# Patient Record
Sex: FEMALE | Race: WHITE | Employment: OTHER | URBAN - METROPOLITAN AREA
[De-identification: names, ages, dates, MRNs, and addresses within clinical notes are randomized per-mention and may not be internally consistent; named-entity substitution may affect disease eponyms.]

---

## 2017-05-18 ENCOUNTER — OFFICE VISIT (OUTPATIENT)
Dept: FAMILY MEDICINE CLINIC | Age: 62
End: 2017-05-18

## 2017-05-18 VITALS
TEMPERATURE: 98 F | HEART RATE: 74 BPM | RESPIRATION RATE: 18 BRPM | WEIGHT: 238 LBS | SYSTOLIC BLOOD PRESSURE: 124 MMHG | DIASTOLIC BLOOD PRESSURE: 82 MMHG | BODY MASS INDEX: 46.72 KG/M2 | HEIGHT: 60 IN

## 2017-05-18 DIAGNOSIS — L03.116 CELLULITIS OF LEFT LOWER EXTREMITY: Primary | ICD-10-CM

## 2017-05-18 DIAGNOSIS — Z76.0 MEDICATION REFILL: ICD-10-CM

## 2017-05-18 DIAGNOSIS — G47.30 SLEEP APNEA, UNSPECIFIED TYPE: ICD-10-CM

## 2017-05-18 PROCEDURE — 99202 OFFICE O/P NEW SF 15 MIN: CPT | Performed by: NURSE PRACTITIONER

## 2017-05-18 RX ORDER — MONTELUKAST SODIUM 10 MG/1
10 TABLET ORAL NIGHTLY
Qty: 30 TABLET | Refills: 0 | Status: SHIPPED | OUTPATIENT
Start: 2017-05-18 | End: 2017-06-01 | Stop reason: SDUPTHER

## 2017-05-18 RX ORDER — SULFAMETHOXAZOLE AND TRIMETHOPRIM 800; 160 MG/1; MG/1
1 TABLET ORAL 2 TIMES DAILY
Qty: 28 TABLET | Refills: 0 | Status: SHIPPED | OUTPATIENT
Start: 2017-05-18 | End: 2017-06-01 | Stop reason: ALTCHOICE

## 2017-05-18 ASSESSMENT — ENCOUNTER SYMPTOMS
VOMITING: 0
DIARRHEA: 0
SHORTNESS OF BREATH: 0
SORE THROAT: 0
WHEEZING: 0
TROUBLE SWALLOWING: 0
NAUSEA: 0
COLOR CHANGE: 1
COUGH: 0

## 2017-06-01 ENCOUNTER — OFFICE VISIT (OUTPATIENT)
Dept: FAMILY MEDICINE CLINIC | Age: 62
End: 2017-06-01

## 2017-06-01 VITALS
SYSTOLIC BLOOD PRESSURE: 128 MMHG | HEART RATE: 76 BPM | BODY MASS INDEX: 45.2 KG/M2 | OXYGEN SATURATION: 96 % | DIASTOLIC BLOOD PRESSURE: 70 MMHG | HEIGHT: 60 IN | WEIGHT: 230.25 LBS | TEMPERATURE: 98 F | RESPIRATION RATE: 18 BRPM

## 2017-06-01 DIAGNOSIS — Z13.220 SCREENING CHOLESTEROL LEVEL: ICD-10-CM

## 2017-06-01 DIAGNOSIS — Z23 NEED FOR SHINGLES VACCINE: ICD-10-CM

## 2017-06-01 DIAGNOSIS — Z11.59 NEED FOR HEPATITIS C SCREENING TEST: ICD-10-CM

## 2017-06-01 DIAGNOSIS — I10 ESSENTIAL HYPERTENSION: ICD-10-CM

## 2017-06-01 DIAGNOSIS — E11.9 CONTROLLED TYPE 2 DIABETES MELLITUS WITHOUT COMPLICATION, WITHOUT LONG-TERM CURRENT USE OF INSULIN (HCC): Primary | ICD-10-CM

## 2017-06-01 DIAGNOSIS — Z11.4 SCREENING FOR HIV (HUMAN IMMUNODEFICIENCY VIRUS): ICD-10-CM

## 2017-06-01 PROBLEM — G25.0 ESSENTIAL TREMOR: Status: ACTIVE | Noted: 2017-06-01

## 2017-06-01 PROBLEM — S83.412A SPRAIN OF MEDIAL COLLATERAL LIGAMENT OF LEFT KNEE: Status: ACTIVE | Noted: 2017-06-01

## 2017-06-01 PROCEDURE — 90736 HZV VACCINE LIVE SUBQ: CPT | Performed by: NURSE PRACTITIONER

## 2017-06-01 PROCEDURE — 99214 OFFICE O/P EST MOD 30 MIN: CPT | Performed by: NURSE PRACTITIONER

## 2017-06-01 PROCEDURE — 90471 IMMUNIZATION ADMIN: CPT | Performed by: NURSE PRACTITIONER

## 2017-06-01 RX ORDER — MONTELUKAST SODIUM 10 MG/1
10 TABLET ORAL NIGHTLY
Qty: 30 TABLET | Refills: 5 | Status: SHIPPED | OUTPATIENT
Start: 2017-06-01 | End: 2017-11-15 | Stop reason: SDUPTHER

## 2017-06-05 ENCOUNTER — HOSPITAL ENCOUNTER (OUTPATIENT)
Dept: PHYSICAL THERAPY | Age: 62
Setting detail: THERAPIES SERIES
Discharge: HOME | End: 2017-06-05
Payer: COMMERCIAL

## 2017-06-05 PROCEDURE — G8979 MOBILITY GOAL STATUS: HCPCS

## 2017-06-05 PROCEDURE — 97162 PT EVAL MOD COMPLEX 30 MIN: CPT

## 2017-06-05 PROCEDURE — G8978 MOBILITY CURRENT STATUS: HCPCS

## 2017-06-05 PROCEDURE — 97110 THERAPEUTIC EXERCISES: CPT

## 2017-06-08 ENCOUNTER — HOSPITAL ENCOUNTER (OUTPATIENT)
Dept: PHYSICAL THERAPY | Age: 62
Setting detail: THERAPIES SERIES
Discharge: HOME | End: 2017-06-08
Payer: COMMERCIAL

## 2017-06-08 PROCEDURE — 97110 THERAPEUTIC EXERCISES: CPT

## 2017-06-08 PROCEDURE — 97140 MANUAL THERAPY 1/> REGIONS: CPT

## 2017-06-08 ASSESSMENT — PAIN DESCRIPTION - FREQUENCY: FREQUENCY: INTERMITTENT

## 2017-06-08 ASSESSMENT — PAIN DESCRIPTION - PAIN TYPE: TYPE: CHRONIC PAIN

## 2017-06-08 ASSESSMENT — PAIN DESCRIPTION - LOCATION: LOCATION: KNEE

## 2017-06-08 ASSESSMENT — PAIN DESCRIPTION - ORIENTATION: ORIENTATION: LEFT

## 2017-06-08 ASSESSMENT — PAIN SCALES - GENERAL: PAINLEVEL_OUTOF10: 3

## 2017-06-12 ENCOUNTER — HOSPITAL ENCOUNTER (OUTPATIENT)
Dept: PHYSICAL THERAPY | Age: 62
Setting detail: THERAPIES SERIES
Discharge: HOME | End: 2017-06-12
Payer: COMMERCIAL

## 2017-06-12 PROCEDURE — 97110 THERAPEUTIC EXERCISES: CPT

## 2017-06-12 ASSESSMENT — ENCOUNTER SYMPTOMS
WHEEZING: 0
COUGH: 0
COLOR CHANGE: 0
ORTHOPNEA: 0
VISUAL CHANGE: 0
BLURRED VISION: 0
CHEST TIGHTNESS: 0
SHORTNESS OF BREATH: 0

## 2017-06-14 ENCOUNTER — HOSPITAL ENCOUNTER (OUTPATIENT)
Dept: PHYSICAL THERAPY | Age: 62
Setting detail: THERAPIES SERIES
Discharge: HOME | End: 2017-06-14
Payer: COMMERCIAL

## 2017-06-14 PROCEDURE — 97140 MANUAL THERAPY 1/> REGIONS: CPT

## 2017-06-14 PROCEDURE — 97110 THERAPEUTIC EXERCISES: CPT

## 2017-06-19 ENCOUNTER — HOSPITAL ENCOUNTER (OUTPATIENT)
Dept: PHYSICAL THERAPY | Age: 62
Setting detail: THERAPIES SERIES
Discharge: HOME | End: 2017-06-19
Payer: COMMERCIAL

## 2017-06-19 PROCEDURE — 97140 MANUAL THERAPY 1/> REGIONS: CPT

## 2017-06-19 PROCEDURE — 97110 THERAPEUTIC EXERCISES: CPT

## 2017-06-21 ENCOUNTER — HOSPITAL ENCOUNTER (OUTPATIENT)
Dept: PHYSICAL THERAPY | Age: 62
Discharge: HOME | End: 2017-06-21

## 2017-06-26 ENCOUNTER — HOSPITAL ENCOUNTER (OUTPATIENT)
Dept: PHYSICAL THERAPY | Age: 62
Setting detail: THERAPIES SERIES
Discharge: HOME | End: 2017-06-26
Payer: COMMERCIAL

## 2017-06-26 PROCEDURE — 97140 MANUAL THERAPY 1/> REGIONS: CPT

## 2017-06-26 PROCEDURE — 97530 THERAPEUTIC ACTIVITIES: CPT

## 2017-06-26 PROCEDURE — 97110 THERAPEUTIC EXERCISES: CPT

## 2017-11-18 RX ORDER — MONTELUKAST SODIUM 10 MG/1
10 TABLET ORAL NIGHTLY
Qty: 30 TABLET | Refills: 1 | Status: SHIPPED | OUTPATIENT
Start: 2017-11-18 | End: 2017-12-06 | Stop reason: SDUPTHER

## 2017-12-01 ENCOUNTER — HOSPITAL ENCOUNTER (OUTPATIENT)
Dept: LAB | Age: 62
Discharge: HOME | End: 2017-12-01
Payer: COMMERCIAL

## 2017-12-01 DIAGNOSIS — Z11.4 SCREENING FOR HIV (HUMAN IMMUNODEFICIENCY VIRUS): ICD-10-CM

## 2017-12-01 DIAGNOSIS — I10 ESSENTIAL HYPERTENSION: ICD-10-CM

## 2017-12-01 DIAGNOSIS — Z13.220 SCREENING CHOLESTEROL LEVEL: ICD-10-CM

## 2017-12-01 DIAGNOSIS — E11.9 CONTROLLED TYPE 2 DIABETES MELLITUS WITHOUT COMPLICATION, WITHOUT LONG-TERM CURRENT USE OF INSULIN (HCC): ICD-10-CM

## 2017-12-01 DIAGNOSIS — Z11.59 NEED FOR HEPATITIS C SCREENING TEST: ICD-10-CM

## 2017-12-01 LAB
ALBUMIN SERPL-MCNC: 4.2 G/DL (ref 3.9–4.9)
ALP BLD-CCNC: 64 U/L (ref 40–130)
ALT SERPL-CCNC: 28 U/L (ref 0–33)
ANION GAP SERPL CALCULATED.3IONS-SCNC: 11 MEQ/L (ref 7–13)
AST SERPL-CCNC: 26 U/L (ref 0–35)
BASOPHILS ABSOLUTE: 0 K/UL (ref 0–0.2)
BASOPHILS RELATIVE PERCENT: 0.4 %
BILIRUB SERPL-MCNC: 0.4 MG/DL (ref 0–1.2)
BUN BLDV-MCNC: 16 MG/DL (ref 8–23)
CALCIUM SERPL-MCNC: 8.9 MG/DL (ref 8.6–10.2)
CHLORIDE BLD-SCNC: 102 MEQ/L (ref 98–107)
CHOLESTEROL, TOTAL: 152 MG/DL (ref 0–199)
CO2: 29 MEQ/L (ref 22–29)
CREAT SERPL-MCNC: 0.61 MG/DL (ref 0.5–0.9)
CREATININE URINE: 175.1 MG/DL
EOSINOPHILS ABSOLUTE: 0.2 K/UL (ref 0–0.7)
EOSINOPHILS RELATIVE PERCENT: 2.6 %
GFR AFRICAN AMERICAN: >60
GFR NON-AFRICAN AMERICAN: >60
GLOBULIN: 2.4 G/DL (ref 2.3–3.5)
GLUCOSE BLD-MCNC: 137 MG/DL (ref 74–109)
HBA1C MFR BLD: 6.4 % (ref 4.8–5.9)
HCT VFR BLD CALC: 41.8 % (ref 37–47)
HDLC SERPL-MCNC: 44 MG/DL (ref 40–59)
HEMOGLOBIN: 13.9 G/DL (ref 12–16)
HEPATITIS C ANTIBODY INTERPRETATION: NORMAL
LDL CHOLESTEROL CALCULATED: 90 MG/DL (ref 0–129)
LYMPHOCYTES ABSOLUTE: 1.6 K/UL (ref 1–4.8)
LYMPHOCYTES RELATIVE PERCENT: 24.4 %
MCH RBC QN AUTO: 29.7 PG (ref 27–31.3)
MCHC RBC AUTO-ENTMCNC: 33.3 % (ref 33–37)
MCV RBC AUTO: 89.2 FL (ref 82–100)
MICROALBUMIN UR-MCNC: 1.3 MG/DL
MICROALBUMIN/CREAT UR-RTO: 7.4 MG/G (ref 0–30)
MONOCYTES ABSOLUTE: 0.5 K/UL (ref 0.2–0.8)
MONOCYTES RELATIVE PERCENT: 7.7 %
NEUTROPHILS ABSOLUTE: 4.3 K/UL (ref 1.4–6.5)
NEUTROPHILS RELATIVE PERCENT: 64.9 %
PDW BLD-RTO: 14.8 % (ref 11.5–14.5)
PLATELET # BLD: 291 K/UL (ref 130–400)
POTASSIUM SERPL-SCNC: 4 MEQ/L (ref 3.5–5.1)
RBC # BLD: 4.69 M/UL (ref 4.2–5.4)
SODIUM BLD-SCNC: 142 MEQ/L (ref 132–144)
T4 FREE: 1.29 NG/DL (ref 0.93–1.7)
TOTAL PROTEIN: 6.6 G/DL (ref 6.4–8.1)
TRIGL SERPL-MCNC: 91 MG/DL (ref 0–200)
TSH SERPL DL<=0.05 MIU/L-ACNC: 1.2 UIU/ML (ref 0.27–4.2)
WBC # BLD: 6.7 K/UL (ref 4.8–10.8)

## 2017-12-01 PROCEDURE — 84443 ASSAY THYROID STIM HORMONE: CPT

## 2017-12-01 PROCEDURE — 82570 ASSAY OF URINE CREATININE: CPT

## 2017-12-01 PROCEDURE — 82043 UR ALBUMIN QUANTITATIVE: CPT

## 2017-12-01 PROCEDURE — 86703 HIV-1/HIV-2 1 RESULT ANTBDY: CPT

## 2017-12-01 PROCEDURE — 36415 COLL VENOUS BLD VENIPUNCTURE: CPT

## 2017-12-01 PROCEDURE — 80061 LIPID PANEL: CPT

## 2017-12-01 PROCEDURE — 80053 COMPREHEN METABOLIC PANEL: CPT

## 2017-12-01 PROCEDURE — 84439 ASSAY OF FREE THYROXINE: CPT

## 2017-12-01 PROCEDURE — 86803 HEPATITIS C AB TEST: CPT

## 2017-12-01 PROCEDURE — 83036 HEMOGLOBIN GLYCOSYLATED A1C: CPT

## 2017-12-01 PROCEDURE — 85025 COMPLETE CBC W/AUTO DIFF WBC: CPT

## 2017-12-04 LAB — HIV-1 AND HIV-2 ANTIBODIES: NEGATIVE

## 2017-12-06 ENCOUNTER — OFFICE VISIT (OUTPATIENT)
Dept: FAMILY MEDICINE CLINIC | Age: 62
End: 2017-12-06

## 2017-12-06 VITALS
RESPIRATION RATE: 18 BRPM | BODY MASS INDEX: 44.72 KG/M2 | OXYGEN SATURATION: 95 % | HEIGHT: 60 IN | TEMPERATURE: 97.5 F | SYSTOLIC BLOOD PRESSURE: 138 MMHG | DIASTOLIC BLOOD PRESSURE: 88 MMHG | WEIGHT: 227.8 LBS | HEART RATE: 86 BPM

## 2017-12-06 DIAGNOSIS — Z12.11 SCREEN FOR COLON CANCER: ICD-10-CM

## 2017-12-06 DIAGNOSIS — J45.20 MILD INTERMITTENT ASTHMA WITHOUT COMPLICATION: ICD-10-CM

## 2017-12-06 DIAGNOSIS — I10 ESSENTIAL HYPERTENSION: ICD-10-CM

## 2017-12-06 DIAGNOSIS — Z12.39 SCREENING BREAST EXAMINATION: ICD-10-CM

## 2017-12-06 DIAGNOSIS — E11.9 TYPE 2 DIABETES MELLITUS WITHOUT COMPLICATION, WITHOUT LONG-TERM CURRENT USE OF INSULIN (HCC): Primary | ICD-10-CM

## 2017-12-06 DIAGNOSIS — Z23 NEED FOR TDAP VACCINATION: ICD-10-CM

## 2017-12-06 PROCEDURE — 3044F HG A1C LEVEL LT 7.0%: CPT | Performed by: NURSE PRACTITIONER

## 2017-12-06 PROCEDURE — 3017F COLORECTAL CA SCREEN DOC REV: CPT | Performed by: NURSE PRACTITIONER

## 2017-12-06 PROCEDURE — G8417 CALC BMI ABV UP PARAM F/U: HCPCS | Performed by: NURSE PRACTITIONER

## 2017-12-06 PROCEDURE — 99213 OFFICE O/P EST LOW 20 MIN: CPT | Performed by: NURSE PRACTITIONER

## 2017-12-06 PROCEDURE — G8427 DOCREV CUR MEDS BY ELIG CLIN: HCPCS | Performed by: NURSE PRACTITIONER

## 2017-12-06 PROCEDURE — 90715 TDAP VACCINE 7 YRS/> IM: CPT | Performed by: NURSE PRACTITIONER

## 2017-12-06 PROCEDURE — G8484 FLU IMMUNIZE NO ADMIN: HCPCS | Performed by: NURSE PRACTITIONER

## 2017-12-06 PROCEDURE — 90471 IMMUNIZATION ADMIN: CPT | Performed by: NURSE PRACTITIONER

## 2017-12-06 PROCEDURE — 1036F TOBACCO NON-USER: CPT | Performed by: NURSE PRACTITIONER

## 2017-12-06 PROCEDURE — 3014F SCREEN MAMMO DOC REV: CPT | Performed by: NURSE PRACTITIONER

## 2017-12-06 RX ORDER — LOSARTAN POTASSIUM 25 MG/1
25 TABLET ORAL DAILY
Qty: 90 TABLET | Refills: 2 | Status: SHIPPED | OUTPATIENT
Start: 2017-12-06 | End: 2018-01-04 | Stop reason: SDUPTHER

## 2017-12-06 RX ORDER — GLIMEPIRIDE 1 MG/1
1 TABLET ORAL
Qty: 90 TABLET | Refills: 2 | Status: SHIPPED | OUTPATIENT
Start: 2017-12-06 | End: 2018-01-04 | Stop reason: SDUPTHER

## 2017-12-06 RX ORDER — LOSARTAN POTASSIUM 25 MG/1
25 TABLET ORAL DAILY
Qty: 30 TABLET | Refills: 0 | Status: SHIPPED | OUTPATIENT
Start: 2017-12-06 | End: 2017-12-06 | Stop reason: SDUPTHER

## 2017-12-06 RX ORDER — METOPROLOL TARTRATE AND HYDROCHLOROTHIAZIDE 100; 25 MG/1; MG/1
1 TABLET ORAL DAILY
Qty: 90 TABLET | Refills: 2 | Status: SHIPPED | OUTPATIENT
Start: 2017-12-06 | End: 2018-08-18 | Stop reason: SDUPTHER

## 2017-12-06 RX ORDER — GLIMEPIRIDE 1 MG/1
1 TABLET ORAL
Qty: 30 TABLET | Refills: 0 | Status: SHIPPED | OUTPATIENT
Start: 2017-12-06 | End: 2017-12-06 | Stop reason: SDUPTHER

## 2017-12-06 RX ORDER — MONTELUKAST SODIUM 10 MG/1
10 TABLET ORAL NIGHTLY
Qty: 90 TABLET | Refills: 2 | Status: SHIPPED | OUTPATIENT
Start: 2017-12-06 | End: 2018-08-18 | Stop reason: SDUPTHER

## 2017-12-06 RX ORDER — METOPROLOL TARTRATE AND HYDROCHLOROTHIAZIDE 100; 25 MG/1; MG/1
1 TABLET ORAL DAILY
Qty: 30 TABLET | Refills: 0 | Status: SHIPPED | OUTPATIENT
Start: 2017-12-06 | End: 2017-12-06 | Stop reason: SDUPTHER

## 2017-12-06 RX ORDER — INFLUENZA A VIRUS A/SINGAPORE/GP1908/2015 IVR-180A (H1N1) ANTIGEN (PROPIOLACTONE INACTIVATED), INFLUENZA A VIRUS A/SINGAPORE/INFIMH-16-0019/2016 IVR-186 (H3N2) ANTIGEN (PROPIOLACTONE INACTIVATED), INFLUENZA B VIRUS B/MARYLAND/15/2016 ANTIGEN (PROPIOLACTONE INACTIVATED), AND INFLUENZA B VIRUS B/PHUKET/3073/2013 BVR-1B ANTIGEN (PROPIOLACTONE INACTIVATED) 15; 15; 15; 15 UG/.5ML; UG/.5ML; UG/.5ML; UG/.5ML
INJECTION, SUSPENSION INTRAMUSCULAR
Refills: 0 | COMMUNITY
Start: 2017-10-12 | End: 2017-12-06

## 2017-12-06 ASSESSMENT — ENCOUNTER SYMPTOMS
EYE PAIN: 0
SHORTNESS OF BREATH: 0
SORE THROAT: 0
SINUS PAIN: 0
VOMITING: 0
NAUSEA: 0
COUGH: 1
ABDOMINAL PAIN: 0
EYE DISCHARGE: 0
DIARRHEA: 0
RHINORRHEA: 0
CHEST TIGHTNESS: 0
WHEEZING: 0
TROUBLE SWALLOWING: 0

## 2017-12-06 NOTE — PROGRESS NOTES
Subjective:      Patient ID: Rosa Corea is a 58 y.o. female who presents today for:  Chief Complaint   Patient presents with    Diabetes     Presents today for a follow up. And medication Refill was last seen on 12/01/17       Diabetes   She presents for her follow-up diabetic visit. She has type 2 diabetes mellitus. Her disease course has been stable. There are no hypoglycemic associated symptoms. Pertinent negatives for hypoglycemia include no dizziness or headaches. There are no diabetic associated symptoms. Pertinent negatives for diabetes include no chest pain, no fatigue, no polydipsia, no polyphagia, no polyuria and no weakness. There are no hypoglycemic complications. Symptoms are stable. Current diabetic treatment includes oral agent (dual therapy) and diet. She is compliant with treatment most of the time. Her weight is stable. Meal planning includes avoidance of concentrated sweets. She has not had a previous visit with a dietitian. She rarely participates in exercise. Frequency home blood tests: does not check blood sugar at home. There is no compliance with monitoring of blood glucose. An ACE inhibitor/angiotensin II receptor blocker is being taken. She does not see a podiatrist.Eye exam is current (next one scheduled in feb). Past Medical History:   Diagnosis Date    Asthma     Sleep apnea     wears cpap    Type 2 diabetes mellitus without complication ([facility] [address]      Current Outpatient Prescriptions on File Prior to Visit   Medication Sig Dispense Refill    VENTOLIN  (90 BASE) MCG/ACT inhaler INHALE 2 PUFFS EVERY 4 HOURS AS NEEDED  5    Multiple Vitamins-Minerals (MULTIVITAMIN & MINERAL PO) Take by mouth      Calcium Carb-Cholecalciferol (CALCIUM CARBONATE-VITAMIN D3 PO) Take by mouth      aspirin 81 MG tablet Take 81 mg by mouth       No current facility-administered medications on file prior to visit.       Past Surgical History:   Procedure Laterality Date    CARPAL TUNNEL 5. Screen for colon cancer  Referral To Gastroenterology External - Junior Asuncion MD   6.  Need for Tdap vaccination  Tdap (age 6y and older) IM ([address])     Orders Placed This Encounter   Procedures    JAMILA DIGITAL SCREEN W OR WO CAD BILATERAL     Standing Status:   Future     Standing Expiration Date:   2/6/2019     Order Specific Question:   Reason for exam:     Answer:   breast cancer screen    Tdap (age 6y and older) IM ([address])    Referral To Gastroenterology External - Junior Asuncion MD     Referral Priority:   Routine     Referral Type:   Consult for Advice and Opinion     Referral Reason:   Specialty Services Required     Referred to Provider:   Toney Foley MD     Requested Specialty:   Gastroenterology     Number of Visits Requested:   1     Orders Placed This Encounter   Medications    DISCONTD: metoprolol-hydrochlorothiazide (LOPRESSOR HCT) 100-25 MG per tablet     Sig: Take 1 tablet by mouth daily     Dispense:  30 tablet     Refill:  0    DISCONTD: losartan (COZAAR) 25 MG tablet     Sig: Take 1 tablet by mouth daily     Dispense:  30 tablet     Refill:  0    DISCONTD: glimepiride (AMARYL) 1 MG tablet     Sig: Take 1 tablet by mouth every morning (before breakfast)     Dispense:  30 tablet     Refill:  0    metoprolol-hydrochlorothiazide (LOPRESSOR HCT) 100-25 MG per tablet     Sig: Take 1 tablet by mouth daily     Dispense:  90 tablet     Refill:  2    losartan (COZAAR) 25 MG tablet     Sig: Take 1 tablet by mouth daily     Dispense:  90 tablet     Refill:  2    glimepiride (AMARYL) 1 MG tablet     Sig: Take 1 tablet by mouth every morning (before breakfast)     Dispense:  90 tablet     Refill:  2    metFORMIN (GLUCOPHAGE) 850 MG tablet     Sig: Take 1 tablet by mouth 2 times daily (with meals)     Dispense:  180 tablet     Refill:  2    montelukast (SINGULAIR) 10 MG tablet     Sig: Take 1 tablet by mouth nightly     Dispense:  90 tablet     Refill:  2    QVAR 80 MCG/ACT inhaler     Sig:

## 2018-01-04 ENCOUNTER — HOSPITAL ENCOUNTER (OUTPATIENT)
Dept: WOMENS IMAGING | Age: 63
Discharge: HOME | End: 2018-01-04
Payer: COMMERCIAL

## 2018-01-04 DIAGNOSIS — I10 ESSENTIAL HYPERTENSION: ICD-10-CM

## 2018-01-04 DIAGNOSIS — E11.9 TYPE 2 DIABETES MELLITUS WITHOUT COMPLICATION, WITHOUT LONG-TERM CURRENT USE OF INSULIN (HCC): ICD-10-CM

## 2018-01-04 DIAGNOSIS — Z12.39 SCREENING BREAST EXAMINATION: ICD-10-CM

## 2018-01-04 PROCEDURE — 77067 SCR MAMMO BI INCL CAD: CPT

## 2018-01-08 RX ORDER — LOSARTAN POTASSIUM 25 MG/1
25 TABLET ORAL DAILY
Qty: 90 TABLET | Refills: 2 | Status: SHIPPED | OUTPATIENT
Start: 2018-01-08 | End: 2018-11-12 | Stop reason: SDUPTHER

## 2018-01-08 RX ORDER — GLIMEPIRIDE 1 MG/1
1 TABLET ORAL
Qty: 90 TABLET | Refills: 2 | Status: SHIPPED | OUTPATIENT
Start: 2018-01-08 | End: 2018-10-15 | Stop reason: SDUPTHER

## 2018-08-18 DIAGNOSIS — J45.20 MILD INTERMITTENT ASTHMA WITHOUT COMPLICATION: ICD-10-CM

## 2018-08-18 DIAGNOSIS — I10 ESSENTIAL HYPERTENSION: ICD-10-CM

## 2018-08-19 RX ORDER — MONTELUKAST SODIUM 10 MG/1
10 TABLET ORAL NIGHTLY
Qty: 90 TABLET | Refills: 2 | Status: SHIPPED | OUTPATIENT
Start: 2018-08-19 | End: 2019-03-08 | Stop reason: SDUPTHER

## 2018-08-19 RX ORDER — METOPROLOL TARTRATE AND HYDROCHLOROTHIAZIDE 100; 25 MG/1; MG/1
1 TABLET ORAL DAILY
Qty: 90 TABLET | Refills: 2 | Status: SHIPPED | OUTPATIENT
Start: 2018-08-19 | End: 2019-03-08 | Stop reason: SDUPTHER

## 2018-08-19 NOTE — TELEPHONE ENCOUNTER
pharmacy requesting medication refill. Please approve or deny this request.    Rx requested:  Requested Prescriptions     Pending Prescriptions Disp Refills    metoprolol-hydrochlorothiazide (LOPRESSOR HCT) 100-25 MG per tablet [Pharmacy Med Name: METOPROLOL HYDROCHLOROTHIAZIDE  TAB  100 25] 90 tablet      Sig: TAKE 1 TABLET BY MOUTH  DAILY    montelukast (SINGULAIR) 10 MG tablet [Pharmacy Med Name: MONTELUKAST 10MG TABLET] 90 tablet      Sig: TAKE 1 TABLET BY MOUTH  NIGHTLY       Last Office Visit:   12/6/17    Last Labs:  12/1/17    Next Visit Date:  No future appointments.

## 2018-10-12 DIAGNOSIS — E11.9 TYPE 2 DIABETES MELLITUS WITHOUT COMPLICATION, WITHOUT LONG-TERM CURRENT USE OF INSULIN (HCC): ICD-10-CM

## 2018-10-13 NOTE — TELEPHONE ENCOUNTER
pharmacy requesting medication refill.  Please approve or deny this request.    Rx requested:  Requested Prescriptions     Pending Prescriptions Disp Refills    glimepiride (AMARYL) 1 MG tablet [Pharmacy Med Name: GLIMEPIRIDE  1MG  TAB] 90 tablet      Sig: TAKE 1 TABLET BY Maria C       Last Office Visit:     12/6/17

## 2018-10-15 RX ORDER — GLIMEPIRIDE 1 MG/1
TABLET ORAL
Qty: 90 TABLET | Refills: 2 | Status: SHIPPED | OUTPATIENT
Start: 2018-10-15 | End: 2019-03-08 | Stop reason: SDUPTHER

## 2018-11-11 DIAGNOSIS — E11.9 TYPE 2 DIABETES MELLITUS WITHOUT COMPLICATION, WITHOUT LONG-TERM CURRENT USE OF INSULIN (HCC): ICD-10-CM

## 2018-11-11 DIAGNOSIS — I10 ESSENTIAL HYPERTENSION: ICD-10-CM

## 2018-11-12 RX ORDER — LOSARTAN POTASSIUM 25 MG/1
25 TABLET ORAL DAILY
Qty: 90 TABLET | Refills: 0 | Status: SHIPPED | OUTPATIENT
Start: 2018-11-12 | End: 2019-03-08 | Stop reason: SDUPTHER

## 2019-01-21 ENCOUNTER — HOSPITAL ENCOUNTER (EMERGENCY)
Age: 64
Discharge: HOME | End: 2019-01-21
Attending: EMERGENCY MEDICINE
Payer: COMMERCIAL

## 2019-01-21 ENCOUNTER — APPOINTMENT (OUTPATIENT)
Dept: CT IMAGING | Age: 64
End: 2019-01-21
Payer: COMMERCIAL

## 2019-01-21 VITALS
RESPIRATION RATE: 18 BRPM | BODY MASS INDEX: 44.76 KG/M2 | SYSTOLIC BLOOD PRESSURE: 138 MMHG | DIASTOLIC BLOOD PRESSURE: 63 MMHG | HEIGHT: 60 IN | WEIGHT: 228 LBS | OXYGEN SATURATION: 97 % | TEMPERATURE: 97.9 F | HEART RATE: 57 BPM

## 2019-01-21 DIAGNOSIS — N20.0 KIDNEY STONE: ICD-10-CM

## 2019-01-21 DIAGNOSIS — R10.9 FLANK PAIN: Primary | ICD-10-CM

## 2019-01-21 LAB
ALBUMIN SERPL-MCNC: 4.3 G/DL (ref 3.9–4.9)
ALP BLD-CCNC: 83 U/L (ref 40–130)
ALT SERPL-CCNC: 23 U/L (ref 0–33)
ANION GAP SERPL CALCULATED.3IONS-SCNC: 13 MEQ/L (ref 7–13)
AST SERPL-CCNC: 21 U/L (ref 0–35)
BACTERIA: ABNORMAL /HPF
BASOPHILS ABSOLUTE: 0 K/UL (ref 0–0.2)
BASOPHILS RELATIVE PERCENT: 0.2 %
BILIRUB SERPL-MCNC: 0.4 MG/DL (ref 0–1.2)
BILIRUBIN URINE: ABNORMAL
BLOOD, URINE: ABNORMAL
BUN BLDV-MCNC: 14 MG/DL (ref 8–23)
CALCIUM SERPL-MCNC: 9.3 MG/DL (ref 8.6–10.2)
CHLORIDE BLD-SCNC: 101 MEQ/L (ref 98–107)
CLARITY: ABNORMAL
CO2: 28 MEQ/L (ref 22–29)
COLOR: YELLOW
CREAT SERPL-MCNC: 0.7 MG/DL (ref 0.5–0.9)
EOSINOPHILS ABSOLUTE: 0.2 K/UL (ref 0–0.7)
EOSINOPHILS RELATIVE PERCENT: 3 %
EPITHELIAL CELLS, UA: ABNORMAL /HPF
GFR AFRICAN AMERICAN: >60
GFR NON-AFRICAN AMERICAN: >60
GLOBULIN: 3 G/DL (ref 2.3–3.5)
GLUCOSE BLD-MCNC: 231 MG/DL (ref 74–109)
GLUCOSE URINE: NEGATIVE MG/DL
HCT VFR BLD CALC: 41.9 % (ref 37–47)
HEMOGLOBIN: 14.3 G/DL (ref 12–16)
KETONES, URINE: ABNORMAL MG/DL
LEUKOCYTE ESTERASE, URINE: NEGATIVE
LYMPHOCYTES ABSOLUTE: 1.4 K/UL (ref 1–4.8)
LYMPHOCYTES RELATIVE PERCENT: 19.6 %
MCH RBC QN AUTO: 29.8 PG (ref 27–31.3)
MCHC RBC AUTO-ENTMCNC: 34.2 % (ref 33–37)
MCV RBC AUTO: 87.1 FL (ref 82–100)
MONOCYTES ABSOLUTE: 0.5 K/UL (ref 0.2–0.8)
MONOCYTES RELATIVE PERCENT: 7.1 %
NEUTROPHILS ABSOLUTE: 5.1 K/UL (ref 1.4–6.5)
NEUTROPHILS RELATIVE PERCENT: 70.1 %
NITRITE, URINE: NEGATIVE
PDW BLD-RTO: 14.8 % (ref 11.5–14.5)
PH UA: 5 (ref 5–9)
PLATELET # BLD: 306 K/UL (ref 130–400)
POTASSIUM SERPL-SCNC: 3.8 MEQ/L (ref 3.5–5.1)
PROTEIN UA: 30 MG/DL
RBC # BLD: 4.8 M/UL (ref 4.2–5.4)
RBC UA: ABNORMAL /HPF (ref 0–2)
SODIUM BLD-SCNC: 142 MEQ/L (ref 132–144)
SPECIFIC GRAVITY UA: >=1.03 (ref 1–1.03)
TOTAL PROTEIN: 7.3 G/DL (ref 6.4–8.1)
URINE REFLEX TO CULTURE: YES
UROBILINOGEN, URINE: 0.2 E.U./DL
WBC # BLD: 7.3 K/UL (ref 4.8–10.8)
WBC UA: ABNORMAL /HPF (ref 0–5)
YEAST: PRESENT

## 2019-01-21 PROCEDURE — 96374 THER/PROPH/DIAG INJ IV PUSH: CPT

## 2019-01-21 PROCEDURE — 96375 TX/PRO/DX INJ NEW DRUG ADDON: CPT

## 2019-01-21 PROCEDURE — 2580000003 HC RX 258: Performed by: EMERGENCY MEDICINE

## 2019-01-21 PROCEDURE — 99284 EMERGENCY DEPT VISIT MOD MDM: CPT

## 2019-01-21 PROCEDURE — 81001 URINALYSIS AUTO W/SCOPE: CPT

## 2019-01-21 PROCEDURE — 85025 COMPLETE CBC W/AUTO DIFF WBC: CPT

## 2019-01-21 PROCEDURE — 36415 COLL VENOUS BLD VENIPUNCTURE: CPT

## 2019-01-21 PROCEDURE — 80053 COMPREHEN METABOLIC PANEL: CPT

## 2019-01-21 PROCEDURE — 6360000002 HC RX W HCPCS: Performed by: EMERGENCY MEDICINE

## 2019-01-21 PROCEDURE — 87086 URINE CULTURE/COLONY COUNT: CPT

## 2019-01-21 PROCEDURE — 74176 CT ABD & PELVIS W/O CONTRAST: CPT

## 2019-01-21 RX ORDER — FENTANYL CITRATE 50 UG/ML
100 INJECTION, SOLUTION INTRAMUSCULAR; INTRAVENOUS ONCE
Status: COMPLETED | OUTPATIENT
Start: 2019-01-21 | End: 2019-01-21

## 2019-01-21 RX ORDER — FLUTICASONE PROPIONATE 50 MCG
1 SPRAY, SUSPENSION (ML) NASAL DAILY
COMMUNITY
End: 2019-07-01 | Stop reason: ALTCHOICE

## 2019-01-21 RX ORDER — 0.9 % SODIUM CHLORIDE 0.9 %
1000 INTRAVENOUS SOLUTION INTRAVENOUS ONCE
Status: COMPLETED | OUTPATIENT
Start: 2019-01-21 | End: 2019-01-21

## 2019-01-21 RX ORDER — TAMSULOSIN HYDROCHLORIDE 0.4 MG/1
0.4 CAPSULE ORAL DAILY
Qty: 5 CAPSULE | Refills: 0 | Status: SHIPPED | OUTPATIENT
Start: 2019-01-21 | End: 2019-03-08 | Stop reason: ALTCHOICE

## 2019-01-21 RX ORDER — ONDANSETRON 4 MG/1
4 TABLET, ORALLY DISINTEGRATING ORAL EVERY 8 HOURS PRN
Qty: 10 TABLET | Refills: 0 | Status: SHIPPED | OUTPATIENT
Start: 2019-01-21 | End: 2019-03-08 | Stop reason: ALTCHOICE

## 2019-01-21 RX ORDER — ONDANSETRON 2 MG/ML
4 INJECTION INTRAMUSCULAR; INTRAVENOUS ONCE
Status: COMPLETED | OUTPATIENT
Start: 2019-01-21 | End: 2019-01-21

## 2019-01-21 RX ORDER — HYDROCODONE BITARTRATE AND ACETAMINOPHEN 5; 325 MG/1; MG/1
1 TABLET ORAL EVERY 6 HOURS PRN
Qty: 20 TABLET | Refills: 0 | Status: SHIPPED | OUTPATIENT
Start: 2019-01-21 | End: 2019-01-27

## 2019-01-21 RX ADMIN — FENTANYL CITRATE 100 MCG: 50 INJECTION INTRAMUSCULAR; INTRAVENOUS at 12:55

## 2019-01-21 RX ADMIN — SODIUM CHLORIDE 1000 ML: 9 INJECTION, SOLUTION INTRAVENOUS at 12:55

## 2019-01-21 RX ADMIN — ONDANSETRON 4 MG: 2 INJECTION INTRAMUSCULAR; INTRAVENOUS at 12:55

## 2019-01-21 ASSESSMENT — ENCOUNTER SYMPTOMS
SORE THROAT: 0
VOMITING: 0
EYE DISCHARGE: 0
EYE PAIN: 0
BACK PAIN: 0
FACIAL SWELLING: 0
NAUSEA: 1
STRIDOR: 0
CHOKING: 0
ABDOMINAL PAIN: 0
CONSTIPATION: 0
BLOOD IN STOOL: 0
COUGH: 0
VOICE CHANGE: 0
DIARRHEA: 0
SHORTNESS OF BREATH: 0
EYE REDNESS: 0
WHEEZING: 0
SINUS PRESSURE: 0
CHEST TIGHTNESS: 0
TROUBLE SWALLOWING: 0

## 2019-01-21 ASSESSMENT — PAIN SCALES - GENERAL
PAINLEVEL_OUTOF10: 3
PAINLEVEL_OUTOF10: 0

## 2019-01-23 LAB — URINE CULTURE, ROUTINE: NORMAL

## 2019-03-08 ENCOUNTER — OFFICE VISIT (OUTPATIENT)
Dept: FAMILY MEDICINE CLINIC | Age: 64
End: 2019-03-08
Payer: COMMERCIAL

## 2019-03-08 ENCOUNTER — HOSPITAL ENCOUNTER (OUTPATIENT)
Age: 64
Setting detail: SPECIMEN
Discharge: HOME | End: 2019-03-08
Payer: COMMERCIAL

## 2019-03-08 VITALS
SYSTOLIC BLOOD PRESSURE: 138 MMHG | WEIGHT: 223 LBS | TEMPERATURE: 98.3 F | HEART RATE: 78 BPM | DIASTOLIC BLOOD PRESSURE: 88 MMHG | RESPIRATION RATE: 14 BRPM | HEIGHT: 60 IN | BODY MASS INDEX: 43.78 KG/M2 | OXYGEN SATURATION: 95 %

## 2019-03-08 DIAGNOSIS — Z87.442 HISTORY OF KIDNEY STONES: ICD-10-CM

## 2019-03-08 DIAGNOSIS — G57.63 MORTON'S NEUROMA OF BOTH FEET: ICD-10-CM

## 2019-03-08 DIAGNOSIS — G47.33 OSA ON CPAP: ICD-10-CM

## 2019-03-08 DIAGNOSIS — G47.30 SLEEP APNEA, UNSPECIFIED TYPE: ICD-10-CM

## 2019-03-08 DIAGNOSIS — G25.0 ESSENTIAL TREMOR: ICD-10-CM

## 2019-03-08 DIAGNOSIS — Z99.89 OSA ON CPAP: ICD-10-CM

## 2019-03-08 DIAGNOSIS — E11.9 TYPE 2 DIABETES MELLITUS WITHOUT COMPLICATION, WITHOUT LONG-TERM CURRENT USE OF INSULIN (HCC): ICD-10-CM

## 2019-03-08 DIAGNOSIS — J30.9 ALLERGIC RHINITIS, UNSPECIFIED SEASONALITY, UNSPECIFIED TRIGGER: ICD-10-CM

## 2019-03-08 DIAGNOSIS — Z12.11 SCREENING FOR COLON CANCER: ICD-10-CM

## 2019-03-08 DIAGNOSIS — E11.9 TYPE 2 DIABETES MELLITUS WITHOUT COMPLICATION, WITHOUT LONG-TERM CURRENT USE OF INSULIN (HCC): Primary | ICD-10-CM

## 2019-03-08 DIAGNOSIS — M19.90 ARTHRITIS: ICD-10-CM

## 2019-03-08 DIAGNOSIS — G62.9 NEUROPATHY: ICD-10-CM

## 2019-03-08 DIAGNOSIS — J45.20 MILD INTERMITTENT ASTHMA WITHOUT COMPLICATION: ICD-10-CM

## 2019-03-08 DIAGNOSIS — I10 ESSENTIAL HYPERTENSION: ICD-10-CM

## 2019-03-08 LAB
CREATININE URINE: 191.9 MG/DL
HBA1C MFR BLD: 6.7 %
MICROALBUMIN UR-MCNC: <1.2 MG/DL
MICROALBUMIN/CREAT UR-RTO: NORMAL MG/G (ref 0–30)

## 2019-03-08 PROCEDURE — 82570 ASSAY OF URINE CREATININE: CPT

## 2019-03-08 PROCEDURE — 82043 UR ALBUMIN QUANTITATIVE: CPT

## 2019-03-08 PROCEDURE — 83036 HEMOGLOBIN GLYCOSYLATED A1C: CPT | Performed by: NURSE PRACTITIONER

## 2019-03-08 PROCEDURE — 99214 OFFICE O/P EST MOD 30 MIN: CPT | Performed by: NURSE PRACTITIONER

## 2019-03-08 RX ORDER — METOPROLOL TARTRATE AND HYDROCHLOROTHIAZIDE 100; 25 MG/1; MG/1
1 TABLET ORAL DAILY
Qty: 90 TABLET | Refills: 2 | Status: SHIPPED | OUTPATIENT
Start: 2019-03-08 | End: 2019-09-09 | Stop reason: SDUPTHER

## 2019-03-08 RX ORDER — MONTELUKAST SODIUM 10 MG/1
10 TABLET ORAL NIGHTLY
Qty: 90 TABLET | Refills: 2 | Status: SHIPPED | OUTPATIENT
Start: 2019-03-08 | End: 2019-09-09 | Stop reason: SDUPTHER

## 2019-03-08 RX ORDER — CETIRIZINE HYDROCHLORIDE 10 MG/1
10 TABLET ORAL DAILY
COMMUNITY
End: 2019-07-01 | Stop reason: ALTCHOICE

## 2019-03-08 RX ORDER — LOSARTAN POTASSIUM 25 MG/1
25 TABLET ORAL DAILY
Qty: 90 TABLET | Refills: 0 | Status: SHIPPED | OUTPATIENT
Start: 2019-03-08 | End: 2019-07-16 | Stop reason: SDUPTHER

## 2019-03-08 RX ORDER — GLIMEPIRIDE 1 MG/1
TABLET ORAL
Qty: 90 TABLET | Refills: 2 | Status: SHIPPED | OUTPATIENT
Start: 2019-03-08 | End: 2019-09-09 | Stop reason: SDUPTHER

## 2019-03-08 ASSESSMENT — PATIENT HEALTH QUESTIONNAIRE - PHQ9
1. LITTLE INTEREST OR PLEASURE IN DOING THINGS: 1
SUM OF ALL RESPONSES TO PHQ QUESTIONS 1-9: 2
SUM OF ALL RESPONSES TO PHQ QUESTIONS 1-9: 2
SUM OF ALL RESPONSES TO PHQ9 QUESTIONS 1 & 2: 2
2. FEELING DOWN, DEPRESSED OR HOPELESS: 1

## 2019-04-09 DIAGNOSIS — Z12.11 SCREENING FOR COLON CANCER: ICD-10-CM

## 2019-04-10 NOTE — PROGRESS NOTES
1st attempt to reach patient. Called patient @ [phone]  and left message on machine for patient to return call during normal business hours of 8:30 AM and 5 PM @ [phone] option 2.

## 2019-06-20 ENCOUNTER — HOSPITAL ENCOUNTER (EMERGENCY)
Age: 64
Discharge: HOME | End: 2019-06-20
Attending: EMERGENCY MEDICINE
Payer: COMMERCIAL

## 2019-06-20 ENCOUNTER — APPOINTMENT (OUTPATIENT)
Dept: CT IMAGING | Age: 64
End: 2019-06-20
Payer: COMMERCIAL

## 2019-06-20 VITALS
SYSTOLIC BLOOD PRESSURE: 141 MMHG | WEIGHT: 230 LBS | TEMPERATURE: 97.6 F | DIASTOLIC BLOOD PRESSURE: 71 MMHG | RESPIRATION RATE: 18 BRPM | OXYGEN SATURATION: 98 % | HEIGHT: 60 IN | HEART RATE: 85 BPM | BODY MASS INDEX: 45.16 KG/M2

## 2019-06-20 DIAGNOSIS — N20.1 URETEROLITHIASIS: Primary | ICD-10-CM

## 2019-06-20 DIAGNOSIS — N20.0 KIDNEY STONE: ICD-10-CM

## 2019-06-20 LAB
BACTERIA: ABNORMAL /HPF
BILIRUBIN URINE: ABNORMAL
BLOOD, URINE: ABNORMAL
CLARITY: ABNORMAL
COLOR: ABNORMAL
EPITHELIAL CELLS, UA: ABNORMAL /HPF
GLUCOSE URINE: 100 MG/DL
KETONES, URINE: ABNORMAL MG/DL
LEUKOCYTE ESTERASE, URINE: NEGATIVE
NITRITE, URINE: NEGATIVE
PH UA: 5.5 (ref 5–9)
PROTEIN UA: 100 MG/DL
RBC UA: >100 /HPF (ref 0–2)
SPECIFIC GRAVITY UA: >=1.03 (ref 1–1.03)
URINE REFLEX TO CULTURE: YES
URINE TYPE: ABNORMAL
UROBILINOGEN, URINE: 0.2 E.U./DL
WBC UA: ABNORMAL /HPF (ref 0–5)

## 2019-06-20 PROCEDURE — 96376 TX/PRO/DX INJ SAME DRUG ADON: CPT

## 2019-06-20 PROCEDURE — 2580000003 HC RX 258: Performed by: EMERGENCY MEDICINE

## 2019-06-20 PROCEDURE — 74176 CT ABD & PELVIS W/O CONTRAST: CPT

## 2019-06-20 PROCEDURE — 99284 EMERGENCY DEPT VISIT MOD MDM: CPT

## 2019-06-20 PROCEDURE — 96374 THER/PROPH/DIAG INJ IV PUSH: CPT

## 2019-06-20 PROCEDURE — 81001 URINALYSIS AUTO W/SCOPE: CPT

## 2019-06-20 PROCEDURE — 96375 TX/PRO/DX INJ NEW DRUG ADDON: CPT

## 2019-06-20 PROCEDURE — 87086 URINE CULTURE/COLONY COUNT: CPT

## 2019-06-20 PROCEDURE — 6360000002 HC RX W HCPCS: Performed by: EMERGENCY MEDICINE

## 2019-06-20 RX ORDER — ONDANSETRON 2 MG/ML
4 INJECTION INTRAMUSCULAR; INTRAVENOUS ONCE
Status: COMPLETED | OUTPATIENT
Start: 2019-06-20 | End: 2019-06-20

## 2019-06-20 RX ORDER — ONDANSETRON 4 MG/1
4 TABLET, ORALLY DISINTEGRATING ORAL EVERY 8 HOURS PRN
Qty: 20 TABLET | Refills: 0 | Status: SHIPPED | OUTPATIENT
Start: 2019-06-20 | End: 2019-09-22

## 2019-06-20 RX ORDER — MORPHINE SULFATE 4 MG/ML
4 INJECTION, SOLUTION INTRAMUSCULAR; INTRAVENOUS
Status: COMPLETED | OUTPATIENT
Start: 2019-06-20 | End: 2019-06-20

## 2019-06-20 RX ORDER — CEPHALEXIN 500 MG/1
500 CAPSULE ORAL 4 TIMES DAILY
Qty: 28 CAPSULE | Refills: 0 | Status: SHIPPED | OUTPATIENT
Start: 2019-06-20 | End: 2019-06-27

## 2019-06-20 RX ORDER — 0.9 % SODIUM CHLORIDE 0.9 %
500 INTRAVENOUS SOLUTION INTRAVENOUS ONCE
Status: COMPLETED | OUTPATIENT
Start: 2019-06-20 | End: 2019-06-20

## 2019-06-20 RX ORDER — MORPHINE SULFATE 4 MG/ML
4 INJECTION, SOLUTION INTRAMUSCULAR; INTRAVENOUS
Status: DISCONTINUED | OUTPATIENT
Start: 2019-06-20 | End: 2019-06-20 | Stop reason: HOSPADM

## 2019-06-20 RX ORDER — OXYCODONE HYDROCHLORIDE AND ACETAMINOPHEN 5; 325 MG/1; MG/1
1-2 TABLET ORAL EVERY 6 HOURS PRN
Qty: 15 TABLET | Refills: 0 | Status: SHIPPED | OUTPATIENT
Start: 2019-06-20 | End: 2019-06-23

## 2019-06-20 RX ADMIN — ONDANSETRON 4 MG: 2 INJECTION INTRAMUSCULAR; INTRAVENOUS at 09:41

## 2019-06-20 RX ADMIN — SODIUM CHLORIDE 500 ML: 9 INJECTION, SOLUTION INTRAVENOUS at 09:41

## 2019-06-20 RX ADMIN — MORPHINE SULFATE 4 MG: 4 INJECTION INTRAVENOUS at 09:44

## 2019-06-20 RX ADMIN — MORPHINE SULFATE 4 MG: 4 INJECTION INTRAVENOUS at 10:33

## 2019-06-20 ASSESSMENT — ENCOUNTER SYMPTOMS
DIARRHEA: 0
BLOOD IN STOOL: 0
BACK PAIN: 1
VOMITING: 0
NAUSEA: 0
GASTROINTESTINAL NEGATIVE: 1
EYES NEGATIVE: 1
ABDOMINAL DISTENTION: 0
EYE PAIN: 0
CHEST TIGHTNESS: 0
SINUS PAIN: 0
SORE THROAT: 0
SHORTNESS OF BREATH: 0
RESPIRATORY NEGATIVE: 1
EYE DISCHARGE: 0
ABDOMINAL PAIN: 0
COUGH: 0
WHEEZING: 0

## 2019-06-20 ASSESSMENT — PAIN SCALES - GENERAL
PAINLEVEL_OUTOF10: 1
PAINLEVEL_OUTOF10: 4
PAINLEVEL_OUTOF10: 9

## 2019-06-20 ASSESSMENT — PAIN DESCRIPTION - ORIENTATION
ORIENTATION: RIGHT
ORIENTATION: RIGHT

## 2019-06-20 ASSESSMENT — PAIN DESCRIPTION - LOCATION
LOCATION: FLANK
LOCATION: FLANK

## 2019-06-20 ASSESSMENT — PAIN DESCRIPTION - DESCRIPTORS
DESCRIPTORS: ACHING
DESCRIPTORS: ACHING

## 2019-06-20 ASSESSMENT — PAIN DESCRIPTION - FREQUENCY: FREQUENCY: CONTINUOUS

## 2019-06-20 ASSESSMENT — PAIN DESCRIPTION - PAIN TYPE
TYPE: ACUTE PAIN
TYPE: ACUTE PAIN

## 2019-06-20 NOTE — ED PROVIDER NOTES
[address] [place] [address] ED  eMERGENCY dEPARTMENT eNCOUnter      Pt Name: Canelo Champion  MRN: [id number]  [place] 1955  Date of evaluation: 6/20/2019  Provider: Radha Ruano, [address] [place] [address]       Chief Complaint   Patient presents with    Flank Pain     right side flank pain started yesterday         HISTORY OF PRESENT ILLNESS   (Location/Symptom, Timing/Onset,Context/Setting, Quality, Duration, Modifying Factors, Severity)  Note limiting factors. Canelo Champion is a 59 y.o. female who presents to the emergency department complaining of right flank pain. The patient states that the symptoms started yesterday. She does have vomiting x1 with this pain. She also had blood in her urine and she noticed some clots. She has a history of kidney stones last one was in January on her left side. HPI    NursingNotes were reviewed. REVIEW OF SYSTEMS    (2-9 systems for level 4, 10 or more for level 5)     Review of Systems   Constitutional: Negative. Negative for chills and fever. HENT: Negative. Negative for ear pain, sinus pain and sore throat. Eyes: Negative. Negative for pain and discharge. Respiratory: Negative. Negative for cough, chest tightness, shortness of breath and wheezing. Cardiovascular: Negative. Negative for chest pain, palpitations and leg swelling. Gastrointestinal: Negative. Negative for abdominal distention, abdominal pain, blood in stool, diarrhea, nausea and vomiting. Endocrine: Negative. Negative for polydipsia and polyuria. Genitourinary: Positive for flank pain and hematuria. Negative for difficulty urinating, dysuria, frequency and urgency. Musculoskeletal: Positive for back pain. Negative for arthralgias, myalgias and neck pain. Skin: Negative. Negative for rash and wound. Neurological: Negative. Negative for dizziness, seizures, syncope, weakness and headaches. Hematological: Negative. Negative for adenopathy. Does not bruise/bleed easily. AS NEEDED       ALLERGIES     Lisinopril; Molds & smuts;  Salsalate; and Statins    FAMILY HISTORY       Family History   Problem Relation Age of Onset    Lung Cancer Mother     Diabetes Mother     Hypertension Mother     Diabetes Father     Hypertension Father     Hypertension Brother     Diabetes Brother     Breast Cancer Maternal Aunt           SOCIAL HISTORY       Social History     Socioeconomic History    Marital status: Single     Spouse name: None    Number of children: None    Years of education: None    Highest education level: None   Occupational History    None   Social Needs    Financial resource strain: None    Food insecurity:     Worry: None     Inability: None    Transportation needs:     Medical: None     Non-medical: None   Tobacco Use    Smoking status: Never Smoker    Smokeless tobacco: Never Used   Substance and Sexual Activity    Alcohol use: Yes     Comment: occassionally    Drug use: No    Sexual activity: Not Currently   Lifestyle    Physical activity:     Days per week: None     Minutes per session: None    Stress: None   Relationships    Social connections:     Talks on phone: None     Gets together: None     Attends Buddhist service: None     Active member of club or organization: None     Attends meetings of clubs or organizations: None     Relationship status: None    Intimate partner violence:     Fear of current or ex partner: None     Emotionally abused: None     Physically abused: None     Forced sexual activity: None   Other Topics Concern    None   Social History Narrative    None       SCREENINGS      @FLOW([id number])@      PHYSICAL EXAM    (up to 7 for level 4, 8 or more for level 5)     ED Triage Vitals   BP Temp Temp Source Pulse Resp SpO2 Height Weight   06/20/19 0929 06/20/19 0929 06/20/19 0929 06/20/19 0929 06/20/19 0921 -- 06/20/19 0921 06/20/19 0921   (!) 194/95 97.6 °F (36.4 °C) Oral 85 20  5' (1.524 m) 230 lb (104.3 kg)       Physical Exam Constitutional: She is oriented to person, place, and time. She appears well-developed and well-nourished. No distress. HENT:   Head: Normocephalic and atraumatic. Right Ear: External ear normal.   Left Ear: External ear normal.   Eyes: Pupils are equal, round, and reactive to light. Conjunctivae and EOM are normal. Right eye exhibits no discharge. Left eye exhibits no discharge. No scleral icterus. Neck: Normal range of motion. Neck supple. No JVD present. No tracheal deviation present. Cardiovascular: Normal rate, regular rhythm, normal heart sounds and intact distal pulses. Exam reveals no friction rub. No murmur heard. Pulmonary/Chest: Effort normal and breath sounds normal. No stridor. No respiratory distress. She has no wheezes. Abdominal: Soft. Bowel sounds are normal. She exhibits no distension. There is no tenderness. Musculoskeletal: Normal range of motion. She exhibits no edema, tenderness or deformity. Lymphadenopathy:     She has no cervical adenopathy. Neurological: She is alert and oriented to person, place, and time. Skin: Skin is warm and dry. No rash noted. She is not diaphoretic. No erythema. No pallor. Psychiatric: She has a normal mood and affect. Her behavior is normal.   Nursing note and vitals reviewed. Tender right CVA outpatient positive Renita Her    DIAGNOSTIC RESULTS     EKG: All EKG's are interpreted by the Emergency Department Physician who either signs or Co-signsthis chart in the absence of a cardiologist.    None    RADIOLOGY:   Non-plain filmimages such as CT, Ultrasound and MRI are read by the radiologist. Plain radiographic images are visualized and preliminarily interpreted by the emergency physician with the below findings:    Patient has a 4 mm stone in the right hand side and she has more stones in her kidney. The patient has no diverticulitis or other intraperitoneal pathology.     Interpretation per the Radiologist below, if available at the time ofthis note:    CT ABDOMEN PELVIS WO CONTRAST Additional Contrast? None   Final Result      1. Mild Hydroureteronephrosis is seen in the right kidney due to an obstructing 4 mm stone in the proximal ureter. Small nonobstructing nephroliths are also seen in the right kidney. 2. There is no evidence for bowel obstruction or diverticulitis. All CT scans at this facility use dose modulation, iterative reconstruction, and/or weight based dosing when appropriate to reduce radiation dose to as low as reasonably achievable. ED BEDSIDE ULTRASOUND:   Performed by ED Physician - none    LABS:  Labs Reviewed   URINE RT REFLEX TO CULTURE - Abnormal; Notable for the following components:       Result Value    Color, UA BROWN (*)     Clarity, UA CLOUDY (*)     Glucose, Ur 100 (*)     Bilirubin Urine SMALL (*)     Ketones, Urine TRACE (*)     Blood, Urine LARGE (*)     Protein,  (*)     All other components within normal limits   MICROSCOPIC URINALYSIS - Abnormal; Notable for the following components:    RBC, UA >100 (*)     All other components within normal limits   URINE CULTURE       All other labs were within normal range or not returned as of this dictation. EMERGENCY DEPARTMENT COURSE and DIFFERENTIAL DIAGNOSIS/MDM:   Vitals:    Vitals:    06/20/19 0921 06/20/19 0929 06/20/19 1032   BP:  (!) 194/95 (!) 141/71   Pulse:  85 85   Resp: 20 20 18   Temp:  97.6 °F (36.4 °C)    TempSrc:  Oral    SpO2:   98%   Weight: 230 lb (104.3 kg)     Height: 5' (1.524 m)         She has blood in her urine probably from the stone although there is some bacteria the urine is contaminated and there is no leukoesterase or nitrites. The odds of her having a urinary tract infection with that scenario are low place her on an antibiotic though prophylactically because of the blood is a median for her to develop infection.   The patient will be placed on Percocet for pain  for referral to urology Zofran for the nausea. The patient is in stable condition for discharge and I have controlled her pain. MDM    CRITICAL CARE TIME   Total Critical Care time was 0 minutes, excluding separately reportableprocedures. There was a high probability of clinicallysignificant/life threatening deterioration in the patient's condition which required my urgent intervention. CONSULTS:  None    PROCEDURES:  Unless otherwise noted below, none     Procedures    FINAL IMPRESSION      1. Ureterolithiasis Stable   2. Kidney stone Stable         DISPOSITION/PLAN   DISPOSITION Decision To Discharge 06/20/2019 11:16:00 AM      PATIENT REFERRED TO:  PRASHANT Salinas - CNP  [address] [address] [address]  [phone]    In 1 week      Seth Lacy MD  [address] [facility]  [address]  [phone]            DISCHARGE MEDICATIONS:  New Prescriptions    CEPHALEXIN (KEFLEX) 500 MG CAPSULE    Take 1 capsule by mouth 4 times daily for 7 days    ONDANSETRON (ZOFRAN ODT) 4 MG DISINTEGRATING TABLET    Take 1 tablet by mouth every 8 hours as needed for Nausea    OXYCODONE-ACETAMINOPHEN (PERCOCET) 5-325 MG PER TABLET    Take 1-2 tablets by mouth every 6 hours as needed for Pain for up to 3 days. WARNING:  May cause drowsiness. May impair ability to operate vehicles or machinery. Do not use in combination with alcohol.           (Please note that portions of this note were completed with a voice recognition program.  Efforts were made to edit the dictations but occasionally words are mis-transcribed.)    Jesus Moreno DO (electronically signed)  Attending Emergency Physician          Jesus Moreno DO  06/20/19 1123

## 2019-06-20 NOTE — ED TRIAGE NOTES
Patient presents to With c/o right side flank pain that started yesterday.  Also reports scant hematuria

## 2019-06-21 DIAGNOSIS — E11.9 TYPE 2 DIABETES MELLITUS WITHOUT COMPLICATION, WITHOUT LONG-TERM CURRENT USE OF INSULIN (HCC): ICD-10-CM

## 2019-06-21 NOTE — TELEPHONE ENCOUNTER
pt requesting medication refill.  Please approve or deny this request.    Rx requested:  Requested Prescriptions     Pending Prescriptions Disp Refills    metFORMIN (GLUCOPHAGE) 850 MG tablet 180 tablet 0     Sig: TAKE 1 TABLET BY MOUTH TWO  TIMES DAILY WITH MEALS         Last Office Visit:   3/8/2019      Next Visit Date:  Future Appointments   Date Time Provider Lori Mckee   7/1/2019  1:30 PM Matt Burch MD [facility]   7/16/2019  8:10 AM Apple Flores[facility] - [facility]   9/9/2019 11:10 AM Apple Flores APRN - CNP [facility]

## 2019-06-22 LAB — URINE CULTURE, ROUTINE: NORMAL

## 2019-06-26 ENCOUNTER — TELEPHONE (OUTPATIENT)
Dept: UROLOGY | Age: 64
End: 2019-06-26

## 2019-06-26 DIAGNOSIS — N20.0 KIDNEY STONES: Primary | ICD-10-CM

## 2019-07-01 ENCOUNTER — HOSPITAL ENCOUNTER (OUTPATIENT)
Dept: GENERAL RADIOLOGY | Age: 64
Discharge: HOME | End: 2019-07-03
Payer: COMMERCIAL

## 2019-07-01 ENCOUNTER — OFFICE VISIT (OUTPATIENT)
Dept: UROLOGY | Age: 64
End: 2019-07-01
Payer: COMMERCIAL

## 2019-07-01 VITALS
BODY MASS INDEX: 45.35 KG/M2 | DIASTOLIC BLOOD PRESSURE: 82 MMHG | HEART RATE: 76 BPM | HEIGHT: 60 IN | SYSTOLIC BLOOD PRESSURE: 132 MMHG | WEIGHT: 231 LBS

## 2019-07-01 DIAGNOSIS — N20.0 KIDNEY STONE: Primary | ICD-10-CM

## 2019-07-01 DIAGNOSIS — N20.0 KIDNEY STONES: ICD-10-CM

## 2019-07-01 LAB
BILIRUBIN, POC: ABNORMAL
BLOOD URINE, POC: ABNORMAL
CLARITY, POC: CLEAR
COLOR, POC: YELLOW
GLUCOSE URINE, POC: ABNORMAL
KETONES, POC: ABNORMAL
LEUKOCYTE EST, POC: ABNORMAL
NITRITE, POC: ABNORMAL
PH, POC: 7
PROTEIN, POC: ABNORMAL
SPECIFIC GRAVITY, POC: 1.01
UROBILINOGEN, POC: 0.2

## 2019-07-01 PROCEDURE — 99242 OFF/OP CONSLTJ NEW/EST SF 20: CPT | Performed by: UROLOGY

## 2019-07-01 PROCEDURE — 81003 URINALYSIS AUTO W/O SCOPE: CPT | Performed by: UROLOGY

## 2019-07-01 PROCEDURE — 74018 RADEX ABDOMEN 1 VIEW: CPT

## 2019-07-01 RX ORDER — AMOXICILLIN 500 MG
CAPSULE ORAL
COMMUNITY

## 2019-07-01 RX ORDER — FLUCONAZOLE 150 MG/1
150 TABLET ORAL DAILY
Qty: 3 TABLET | Refills: 0 | Status: SHIPPED | OUTPATIENT
Start: 2019-07-01 | End: 2019-09-22

## 2019-07-01 RX ORDER — FEXOFENADINE HCL 180 MG/1
180 TABLET ORAL DAILY
COMMUNITY
End: 2020-06-15 | Stop reason: SINTOL

## 2019-07-01 NOTE — PROGRESS NOTES
secondary to the stone. KUB reviewed no stone noted. CT reviewed. Musculoskeletal: Normal range of motion. Ambulatory normal strength. Extremities: No edema  Neurological: Cranial nerves intact   Skin: Skin is warm and dry. No lesions. No rashes or ulcers   Psychiatric: Normal affect. Assessment/Medical Necessity-Decision Making  4 mm x 2 mm stone history of currently asymptomatic no evidence of stone on KUB  Vaginal yeast infection secondary to antibiotic given  Plan  Diflucan prescription given  Dietary restrictions regarding stone disease reviewed in detail  Follow-up PRN or if pain recurs  Greater than 50% of 30 minutes spent consulting patient face-to-face  Orders Placed This Encounter   Procedures    POCT Urinalysis No Micro (Auto)     Orders Placed This Encounter   Medications    fluconazole (DIFLUCAN) 150 MG tablet     Sig: Take 1 tablet by mouth daily     Dispense:  3 tablet     Refill:  0     Pilar Pemberton MD       Please note this report has been partially produced using speech recognition software  And may cause contain errors related to that system including grammar, punctuation and spelling as well as words and phrases that may seem inappropriate. If there are questions or concerns please feel free to contact me to clarify.

## 2019-07-16 ENCOUNTER — OFFICE VISIT (OUTPATIENT)
Dept: FAMILY MEDICINE CLINIC | Age: 64
End: 2019-07-16
Payer: COMMERCIAL

## 2019-07-16 VITALS
WEIGHT: 229.2 LBS | RESPIRATION RATE: 14 BRPM | DIASTOLIC BLOOD PRESSURE: 82 MMHG | BODY MASS INDEX: 45 KG/M2 | HEART RATE: 70 BPM | SYSTOLIC BLOOD PRESSURE: 132 MMHG | HEIGHT: 60 IN | OXYGEN SATURATION: 97 % | TEMPERATURE: 96.2 F

## 2019-07-16 DIAGNOSIS — Z87.442 HISTORY OF KIDNEY STONES: ICD-10-CM

## 2019-07-16 DIAGNOSIS — J45.41 MODERATE PERSISTENT ASTHMA WITH ACUTE EXACERBATION: ICD-10-CM

## 2019-07-16 DIAGNOSIS — M51.37 DDD (DEGENERATIVE DISC DISEASE), LUMBOSACRAL: ICD-10-CM

## 2019-07-16 DIAGNOSIS — G57.63 MORTON'S NEUROMA OF BOTH FEET: ICD-10-CM

## 2019-07-16 DIAGNOSIS — I10 ESSENTIAL HYPERTENSION: Primary | ICD-10-CM

## 2019-07-16 DIAGNOSIS — J30.9 ALLERGIC RHINITIS, UNSPECIFIED SEASONALITY, UNSPECIFIED TRIGGER: ICD-10-CM

## 2019-07-16 PROBLEM — M51.379 DDD (DEGENERATIVE DISC DISEASE), LUMBOSACRAL: Status: ACTIVE | Noted: 2019-07-16

## 2019-07-16 PROCEDURE — 99214 OFFICE O/P EST MOD 30 MIN: CPT | Performed by: NURSE PRACTITIONER

## 2019-07-16 RX ORDER — BUDESONIDE 0.25 MG/2ML
250 INHALANT ORAL 2 TIMES DAILY
Qty: 60 AMPULE | Refills: 3 | Status: SHIPPED | OUTPATIENT
Start: 2019-07-16 | End: 2020-06-15 | Stop reason: SDUPTHER

## 2019-07-16 RX ORDER — LOSARTAN POTASSIUM 25 MG/1
25 TABLET ORAL DAILY
Qty: 90 TABLET | Refills: 0 | Status: SHIPPED | OUTPATIENT
Start: 2019-07-16 | End: 2019-09-09 | Stop reason: SDUPTHER

## 2019-07-16 NOTE — PROGRESS NOTES
Chief Complaint   Patient presents with    Follow-Up from Hospital     Patient presents today to follow up from kidney stones that she had on 6/20. Patient was treated at the ER and was told to follow up with PCP. HPI: Jesus Martins is a 59 y.o. female presenting for follow-up of kidney stone for which she was seen in the ER on June 20. She woke up with flank pain. Had a 4 mm stone on the right side. She was given morphine. Later developed constipation and had to use suppository and laxative. She states that she has not had any further issues with constipation since that time. Bowels have returned to normal and there is no abdominal pain or flank pain. Urine is normal color. No burning with urination. She did develop a yeast infection from antibiotics and was treated for this. She saw urologist last week and stone is gone. She was given information about dietary adjustments to make and does have several questions regarding diet to help reduce risk of stones. 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 She has followed with podiatry in [place] and plans to make an appointment soon. Allergic rhinitis/asthma: She states that she has had bad allergies lately. Has been taking antihistamine routinely but has been having issues with her breathing. Has occasional chest tightness and wheezing at times. Has been taking rescue inhaler more often. Insurance no longer covers Qvar inhaler but does used to work very well for her. Low back pain: She states that she has an ongoing history of some low back pain but nothing as severe. She has questions regarding recent CT scan results with degenerative findings to her lower back. ROS: This patient reports no chest pain or pressure. There is no chest congestion or sputum production. The patient reports no nausea or vomiting.   There is no heartburn or water intake. 3.  She plans to follow with her podiatrist in the near future. Has already established care with Dr. Verner Grade. 4.  Reviewed CT scan finding of the abdomen and pelvis to include degenerative lumbar spine changes. She does not want a referral at this time and states that symptoms are not significant. She is aware that she lost 2 inches of her height over time. 5.  6.  Continue antihistamine but will add on nebulizer solution for corticosteroid. She is unable to afford inhaler. Nebulizer supplied from office and given to patient. This should help reduce need of rescue inhaler. I do recommend getting a chest x-ray done. CT scan of the abdomen and pelvis showed a radiopaque density over the area of the heart. Order given. I spent more than 30 minutes answering patient's questions and addressing her concerns regarding recent CT scan findings. Also spent several minutes discussing dietary change recommendations. Please note this report has been partially produced using speech recognition software and may cause contain errors related to that system including grammar, punctuation and spelling as well as words and phrases that may seem inappropriate. If there are questions or concerns please feel free to contact me to clarify.         Electronically signed by Maggie Armstrong [id number], 1:02 PM 7/16/19

## 2019-08-28 ENCOUNTER — HOSPITAL ENCOUNTER (EMERGENCY)
Age: 64
Discharge: HOME | End: 2019-08-28
Attending: EMERGENCY MEDICINE
Payer: COMMERCIAL

## 2019-08-28 VITALS
SYSTOLIC BLOOD PRESSURE: 158 MMHG | OXYGEN SATURATION: 97 % | HEART RATE: 67 BPM | HEIGHT: 60 IN | WEIGHT: 226 LBS | RESPIRATION RATE: 18 BRPM | TEMPERATURE: 98.1 F | DIASTOLIC BLOOD PRESSURE: 67 MMHG | BODY MASS INDEX: 44.37 KG/M2

## 2019-08-28 DIAGNOSIS — N30.01 ACUTE CYSTITIS WITH HEMATURIA: Primary | ICD-10-CM

## 2019-08-28 LAB
BACTERIA: ABNORMAL /HPF
BILIRUBIN URINE: ABNORMAL
BLOOD, URINE: ABNORMAL
CLARITY: ABNORMAL
COLOR: ABNORMAL
EPITHELIAL CELLS, UA: ABNORMAL /HPF
GLUCOSE URINE: NEGATIVE MG/DL
KETONES, URINE: NEGATIVE MG/DL
LEUKOCYTE ESTERASE, URINE: NEGATIVE
NITRITE, URINE: NEGATIVE
PH UA: 5.5 (ref 5–9)
PROTEIN UA: 100 MG/DL
RBC UA: ABNORMAL /HPF (ref 0–2)
SPECIFIC GRAVITY UA: 1.01 (ref 1–1.03)
URINE REFLEX TO CULTURE: YES
URINE TYPE: ABNORMAL
UROBILINOGEN, URINE: 0.2 E.U./DL
WBC UA: ABNORMAL /HPF (ref 0–5)

## 2019-08-28 PROCEDURE — 87086 URINE CULTURE/COLONY COUNT: CPT

## 2019-08-28 PROCEDURE — 99283 EMERGENCY DEPT VISIT LOW MDM: CPT

## 2019-08-28 PROCEDURE — 81001 URINALYSIS AUTO W/SCOPE: CPT

## 2019-08-28 PROCEDURE — 6370000000 HC RX 637 (ALT 250 FOR IP): Performed by: EMERGENCY MEDICINE

## 2019-08-28 RX ORDER — PHENAZOPYRIDINE HYDROCHLORIDE 200 MG/1
200 TABLET, FILM COATED ORAL 3 TIMES DAILY PRN
Qty: 6 TABLET | Refills: 0 | Status: SHIPPED | OUTPATIENT
Start: 2019-08-28 | End: 2019-08-31

## 2019-08-28 RX ORDER — PHENAZOPYRIDINE HYDROCHLORIDE 100 MG/1
200 TABLET, FILM COATED ORAL ONCE
Status: COMPLETED | OUTPATIENT
Start: 2019-08-28 | End: 2019-08-28

## 2019-08-28 RX ORDER — CIPROFLOXACIN 500 MG/1
500 TABLET, FILM COATED ORAL 2 TIMES DAILY
Qty: 14 TABLET | Refills: 0 | Status: SHIPPED | OUTPATIENT
Start: 2019-08-28 | End: 2019-09-04

## 2019-08-28 RX ORDER — CIPROFLOXACIN 500 MG/1
500 TABLET, FILM COATED ORAL ONCE
Status: COMPLETED | OUTPATIENT
Start: 2019-08-28 | End: 2019-08-28

## 2019-08-28 RX ADMIN — CIPROFLOXACIN HYDROCHLORIDE 500 MG: 500 TABLET, FILM COATED ORAL at 09:27

## 2019-08-28 RX ADMIN — PHENAZOPYRIDINE 200 MG: 100 TABLET ORAL at 09:27

## 2019-08-28 ASSESSMENT — ENCOUNTER SYMPTOMS
SINUS PRESSURE: 0
ABDOMINAL PAIN: 0
DIARRHEA: 0
EYE DISCHARGE: 0
CHEST TIGHTNESS: 0
VOMITING: 0
CHOKING: 0
BACK PAIN: 0
SHORTNESS OF BREATH: 0
COUGH: 0
SORE THROAT: 0
EYE PAIN: 0
CONSTIPATION: 0
EYE REDNESS: 0
VOICE CHANGE: 0
FACIAL SWELLING: 0
BLOOD IN STOOL: 0
STRIDOR: 0
WHEEZING: 0
TROUBLE SWALLOWING: 0

## 2019-08-28 ASSESSMENT — PAIN DESCRIPTION - ONSET: ONSET: ON-GOING

## 2019-08-28 ASSESSMENT — PAIN - FUNCTIONAL ASSESSMENT: PAIN_FUNCTIONAL_ASSESSMENT: ACTIVITIES ARE NOT PREVENTED

## 2019-08-28 ASSESSMENT — PAIN DESCRIPTION - FREQUENCY: FREQUENCY: INTERMITTENT

## 2019-08-28 ASSESSMENT — PAIN SCALES - GENERAL: PAINLEVEL_OUTOF10: 1

## 2019-08-28 ASSESSMENT — PAIN DESCRIPTION - DESCRIPTORS: DESCRIPTORS: CRAMPING

## 2019-08-28 ASSESSMENT — PAIN DESCRIPTION - LOCATION: LOCATION: ABDOMEN;PELVIS

## 2019-08-28 ASSESSMENT — PAIN DESCRIPTION - ORIENTATION: ORIENTATION: LOWER;MID

## 2019-08-28 ASSESSMENT — PAIN DESCRIPTION - PROGRESSION: CLINICAL_PROGRESSION: NOT CHANGED

## 2019-08-28 ASSESSMENT — PAIN DESCRIPTION - PAIN TYPE: TYPE: ACUTE PAIN

## 2019-08-28 NOTE — ED PROVIDER NOTES
Negative for pallor and rash. Neurological: Negative for tremors, seizures, syncope, weakness, numbness and headaches. Hematological: Negative for adenopathy. Does not bruise/bleed easily. Psychiatric/Behavioral: Negative for agitation, behavioral problems, hallucinations and sleep disturbance. The patient is not hyperactive. All other systems reviewed and are negative. Except as noted above the remainder of the review of systems was reviewed and negative.        PAST MEDICAL HISTORY     Past Medical History:   Diagnosis Date    Asthma     Hypertension     Kidney stone     Sleep apnea     wears cpap    Type 2 diabetes mellitus without complication (HCC)          SURGICALHISTORY       Past Surgical History:   Procedure Laterality Date    CARPAL TUNNEL RELEASE Bilateral 80's    carpal tunnel both wrists and right foot, POLLACK'S NEUROMA    EYE SURGERY Right 03/2017    had film removed on right eye    JOINT REPLACEMENT Left 09/22/2010    left thumb replacement    KNEE SURGERY Bilateral     torn meniscus 1/16/2009, 2/12/2009, 2/2016    KNEE SURGERY Left 02/2016    OVARIAN CYST REMOVAL  05/13/2008    ROTATOR CUFF REPAIR Left 01/08/2004    ROTATOR CUFF REPAIR Right 08/22/2005    TONSILLECTOMY AND ADENOIDECTOMY  1963         CURRENT MEDICATIONS       Previous Medications    ASPIRIN 81 MG TABLET    Take 81 mg by mouth    BUDESONIDE (PULMICORT) 0.25 MG/2ML NEBULIZER SUSPENSION    Take 2 mLs by nebulization 2 times daily    CALCIUM CARB-CHOLECALCIFEROL (CALCIUM CARBONATE-VITAMIN D3 PO)    Take by mouth    FEXOFENADINE (ALLEGRA ALLERGY) 180 MG TABLET    Take 180 mg by mouth daily    FLUCONAZOLE (DIFLUCAN) 150 MG TABLET    Take 1 tablet by mouth daily    GLIMEPIRIDE (AMARYL) 1 MG TABLET    TAKE 1 TABLET BY MOUTH  EVERY MORNING BEFORE  BREAKFAST    LOSARTAN (COZAAR) 25 MG TABLET    Take 1 tablet by mouth daily    METFORMIN (GLUCOPHAGE) 850 MG TABLET    TAKE 1 TABLET BY MOUTH TWO  TIMES DAILY WITH MEALS partner: None     Emotionally abused: None     Physically abused: None     Forced sexual activity: None   Other Topics Concern    None   Social History Narrative    None       SCREENINGS      @FLOW([id number])@      PHYSICAL EXAM    (up to 7 for level 4, 8 or more for level 5)     ED Triage Vitals   BP Temp Temp src Pulse Resp SpO2 Height Weight   -- -- -- -- -- -- -- --       Physical Exam   Constitutional: She is oriented to person, place, and time. She appears well-developed and well-nourished. Active alert cooperative patient in nontoxic appearance no evidence of dehydration ambulatory   HENT:   Head: Normocephalic and atraumatic. Right Ear: External ear normal.   Left Ear: External ear normal.   Nose: Nose normal.   Mouth/Throat: Oropharynx is clear and moist.   Eyes: Pupils are equal, round, and reactive to light. Conjunctivae and EOM are normal.   Neck: Normal range of motion. Neck supple. Cardiovascular: Normal rate, regular rhythm, normal heart sounds and intact distal pulses. Exam reveals no gallop. No murmur heard. Pulmonary/Chest: Breath sounds normal. No respiratory distress. She has no wheezes. Abdominal: Soft. Bowel sounds are normal. She exhibits no mass. There is no rebound. Musculoskeletal: Normal range of motion. She exhibits no edema or tenderness. Neurological: She is alert and oriented to person, place, and time. No cranial nerve deficit. She exhibits normal muscle tone. Skin: Skin is warm. No rash noted. No erythema. Psychiatric: Her behavior is normal. Thought content normal.   Nursing note and vitals reviewed.       DIAGNOSTIC RESULTS     EKG: All EKG's are interpreted by the Emergency Department Physician who either signs or Co-signsthis chart in the absence of a cardiologist.        RADIOLOGY:   Non-plain filmimages such as CT, Ultrasound and MRI are read by the radiologist. Plain radiographic images are visualized and preliminarily interpreted by the emergency physician

## 2019-08-30 LAB — URINE CULTURE, ROUTINE: NORMAL

## 2019-09-04 ENCOUNTER — HOSPITAL ENCOUNTER (OUTPATIENT)
Dept: LAB | Age: 64
Discharge: HOME | End: 2019-09-04
Payer: COMMERCIAL

## 2019-09-04 ENCOUNTER — HOSPITAL ENCOUNTER (OUTPATIENT)
Dept: GENERAL RADIOLOGY | Age: 64
Discharge: HOME | End: 2019-09-06
Payer: COMMERCIAL

## 2019-09-04 ENCOUNTER — HOSPITAL ENCOUNTER (OUTPATIENT)
Age: 64
Discharge: HOME | End: 2019-09-06
Payer: COMMERCIAL

## 2019-09-04 DIAGNOSIS — E11.9 TYPE 2 DIABETES MELLITUS WITHOUT COMPLICATION, WITHOUT LONG-TERM CURRENT USE OF INSULIN (HCC): ICD-10-CM

## 2019-09-04 DIAGNOSIS — J45.41 MODERATE PERSISTENT ASTHMA WITH ACUTE EXACERBATION: ICD-10-CM

## 2019-09-04 DIAGNOSIS — I10 ESSENTIAL HYPERTENSION: ICD-10-CM

## 2019-09-04 LAB
ALBUMIN SERPL-MCNC: 4.1 G/DL (ref 3.5–4.6)
ALP BLD-CCNC: 73 U/L (ref 40–130)
ALT SERPL-CCNC: 19 U/L (ref 0–33)
ANION GAP SERPL CALCULATED.3IONS-SCNC: 15 MEQ/L (ref 9–15)
AST SERPL-CCNC: 22 U/L (ref 0–35)
BASOPHILS ABSOLUTE: 0 K/UL (ref 0–0.2)
BASOPHILS RELATIVE PERCENT: 0.5 %
BILIRUB SERPL-MCNC: 0.4 MG/DL (ref 0.2–0.7)
BUN BLDV-MCNC: 14 MG/DL (ref 8–23)
CALCIUM SERPL-MCNC: 9.2 MG/DL (ref 8.5–9.9)
CHLORIDE BLD-SCNC: 101 MEQ/L (ref 95–107)
CHOLESTEROL, TOTAL: 126 MG/DL (ref 0–199)
CO2: 25 MEQ/L (ref 20–31)
CREAT SERPL-MCNC: 0.79 MG/DL (ref 0.5–0.9)
CREATININE URINE: 238.4 MG/DL
EOSINOPHILS ABSOLUTE: 0.2 K/UL (ref 0–0.7)
EOSINOPHILS RELATIVE PERCENT: 3 %
GFR AFRICAN AMERICAN: >60
GFR NON-AFRICAN AMERICAN: >60
GLOBULIN: 3 G/DL (ref 2.3–3.5)
GLUCOSE BLD-MCNC: 158 MG/DL (ref 70–99)
HBA1C MFR BLD: 7.2 % (ref 4.8–5.9)
HCT VFR BLD CALC: 41.3 % (ref 37–47)
HDLC SERPL-MCNC: 36 MG/DL (ref 40–59)
HEMOGLOBIN: 13.4 G/DL (ref 12–16)
LDL CHOLESTEROL CALCULATED: 71 MG/DL (ref 0–129)
LYMPHOCYTES ABSOLUTE: 1.6 K/UL (ref 1–4.8)
LYMPHOCYTES RELATIVE PERCENT: 25.6 %
MCH RBC QN AUTO: 27.9 PG (ref 27–31.3)
MCHC RBC AUTO-ENTMCNC: 32.6 % (ref 33–37)
MCV RBC AUTO: 85.6 FL (ref 82–100)
MICROALBUMIN UR-MCNC: 3.3 MG/DL
MICROALBUMIN/CREAT UR-RTO: 13.8 MG/G (ref 0–30)
MONOCYTES ABSOLUTE: 0.5 K/UL (ref 0.2–0.8)
MONOCYTES RELATIVE PERCENT: 7.5 %
NEUTROPHILS ABSOLUTE: 3.9 K/UL (ref 1.4–6.5)
NEUTROPHILS RELATIVE PERCENT: 63.4 %
PDW BLD-RTO: 16.8 % (ref 11.5–14.5)
PLATELET # BLD: 272 K/UL (ref 130–400)
POTASSIUM SERPL-SCNC: 3.6 MEQ/L (ref 3.4–4.9)
RBC # BLD: 4.82 M/UL (ref 4.2–5.4)
SODIUM BLD-SCNC: 141 MEQ/L (ref 135–144)
TOTAL PROTEIN: 7.1 G/DL (ref 6.3–8)
TRIGL SERPL-MCNC: 94 MG/DL (ref 0–150)
WBC # BLD: 6.1 K/UL (ref 4.8–10.8)

## 2019-09-04 PROCEDURE — 83036 HEMOGLOBIN GLYCOSYLATED A1C: CPT

## 2019-09-04 PROCEDURE — 82043 UR ALBUMIN QUANTITATIVE: CPT

## 2019-09-04 PROCEDURE — 80061 LIPID PANEL: CPT

## 2019-09-04 PROCEDURE — 80053 COMPREHEN METABOLIC PANEL: CPT

## 2019-09-04 PROCEDURE — 36415 COLL VENOUS BLD VENIPUNCTURE: CPT

## 2019-09-04 PROCEDURE — 71046 X-RAY EXAM CHEST 2 VIEWS: CPT

## 2019-09-04 PROCEDURE — 85025 COMPLETE CBC W/AUTO DIFF WBC: CPT

## 2019-09-04 PROCEDURE — 82570 ASSAY OF URINE CREATININE: CPT

## 2019-09-05 ENCOUNTER — TELEPHONE (OUTPATIENT)
Dept: FAMILY MEDICINE CLINIC | Age: 64
End: 2019-09-05

## 2019-09-05 DIAGNOSIS — R93.89 ABNORMAL CXR: Primary | ICD-10-CM

## 2019-09-05 NOTE — TELEPHONE ENCOUNTER
----- Message from [facility] [place], [place] sent at 9/5/2019  1:26 PM EDT -----  Patient made aware and would like CT scan ordered

## 2019-09-09 ENCOUNTER — OFFICE VISIT (OUTPATIENT)
Dept: FAMILY MEDICINE CLINIC | Age: 64
End: 2019-09-09
Payer: COMMERCIAL

## 2019-09-09 VITALS
DIASTOLIC BLOOD PRESSURE: 78 MMHG | WEIGHT: 229 LBS | HEART RATE: 63 BPM | SYSTOLIC BLOOD PRESSURE: 132 MMHG | BODY MASS INDEX: 43.23 KG/M2 | OXYGEN SATURATION: 98 % | RESPIRATION RATE: 18 BRPM | TEMPERATURE: 98.1 F | HEIGHT: 61 IN

## 2019-09-09 DIAGNOSIS — E78.6 LOW HDL (UNDER 40): ICD-10-CM

## 2019-09-09 DIAGNOSIS — G47.33 OSA ON CPAP: ICD-10-CM

## 2019-09-09 DIAGNOSIS — Z99.89 OSA ON CPAP: ICD-10-CM

## 2019-09-09 DIAGNOSIS — I10 ESSENTIAL HYPERTENSION: ICD-10-CM

## 2019-09-09 DIAGNOSIS — J30.9 ALLERGIC RHINITIS, UNSPECIFIED SEASONALITY, UNSPECIFIED TRIGGER: ICD-10-CM

## 2019-09-09 DIAGNOSIS — E11.9 TYPE 2 DIABETES MELLITUS WITHOUT COMPLICATION, WITHOUT LONG-TERM CURRENT USE OF INSULIN (HCC): Primary | ICD-10-CM

## 2019-09-09 DIAGNOSIS — J45.20 MILD INTERMITTENT ASTHMA WITHOUT COMPLICATION: ICD-10-CM

## 2019-09-09 PROCEDURE — 99214 OFFICE O/P EST MOD 30 MIN: CPT | Performed by: NURSE PRACTITIONER

## 2019-09-09 RX ORDER — GLIMEPIRIDE 1 MG/1
TABLET ORAL
Qty: 90 TABLET | Refills: 2 | Status: SHIPPED | OUTPATIENT
Start: 2019-09-09 | End: 2020-06-15 | Stop reason: SDUPTHER

## 2019-09-09 RX ORDER — LOSARTAN POTASSIUM 25 MG/1
25 TABLET ORAL DAILY
Qty: 90 TABLET | Refills: 2 | Status: SHIPPED | OUTPATIENT
Start: 2019-09-09 | End: 2020-06-15 | Stop reason: SDUPTHER

## 2019-09-09 RX ORDER — MONTELUKAST SODIUM 10 MG/1
10 TABLET ORAL NIGHTLY
Qty: 90 TABLET | Refills: 2 | Status: SHIPPED | OUTPATIENT
Start: 2019-09-09 | End: 2020-06-15 | Stop reason: SDUPTHER

## 2019-09-09 RX ORDER — METOPROLOL TARTRATE AND HYDROCHLOROTHIAZIDE 100; 25 MG/1; MG/1
1 TABLET ORAL DAILY
Qty: 90 TABLET | Refills: 2 | Status: SHIPPED | OUTPATIENT
Start: 2019-09-09 | End: 2020-06-15 | Stop reason: SDUPTHER

## 2019-09-09 NOTE — PROGRESS NOTES
Subjective:     Diabetes Mellitus Type 2: Current symptoms/problems include none. Occasional toe neuropathy from old injury. Home blood sugar records:  patient does not test  Any episodes of hypoglycemia? no  Tobacco history: She  reports that she has never smoked. She has never used smokeless tobacco.   Known diabetic complications: none    Hypertension:  Home blood pressure monitoring: No.  She is adherent to a low sodium diet. Antihypertensive medication side effects: no medication side effects noted. Use of agents associated with hypertension: none. Hyperlipidemia:  No new myalgias or GI upset on OTC Fish Oil. Allergic rhinitis/asthma: She states that allergies have been tolerable lately and she has not had to use rescue inhaler much for her breathing. No chronic cough or chest congestion. Sleep apnea: Gordo Pierre has obstructive sleep apnea that is treated with CPAP. The CPAP is used  8 hours 7 nights per week. The CPAP use helps the daytime sleepiness and low energy levels. Abnormal CXR: She states that she is scheduled for CT scan on Thursday of this week. Is nervous about the results because her mother had lung cancer and although the patient never smoked she was a  for several years. The five diabetic measures for control:   Hemoglobin A1C (%)   Date Value   09/04/2019 7.2 (H)     LDL Calculated (mg/dL)   Date Value   09/04/2019 71         Blood pressure less than 131/81,   BP Readings from Last 1 Encounters:   09/09/19 132/78     Smoking, non smoker is goal. This pt is not a smoker. This pt does an aspirin a day. Last eye exam was 2019  Last diabetic foot exam was 2019  Last urine microalbumin creatinine ratio was 2019    PMH: This 59 y.o. female  patient is  hypertensive, is  diabetic, is  hyperlipidemic. She has no history of smoking and has a  family history of heart disease. She is obese.     Review of Systems  Patient denies chest pain, shortness of breath, lightheadedness, blurred vision, peripheral edema and palpitations. Eyes: no rapid change in vision  Ears, nose, mouth, throat, and face: no changes in hearing or sore throat  Respiratory: No shortness of breath or cough. Cardiovascular: no chest pain or pressure. This patient reports no polyuria, polydipsia or episodes of hypoglycemia. Treatment Adherence:   Medication compliance:  compliant most of the time  Diet compliance:  compliant most of the time  Weight trend: stable  Current exercise: walks 3 time(s) per week  What might prevent you from meeting your goal?: none  Patient plan for overcoming barriers: N/A     Patient Confidence: 8/10      Objective:   EXAM:  Constitutional Blood pressure 132/78, pulse 63, temperature 98.1 °F (36.7 °C), temperature source Temporal, resp. rate 18, height 5' 0.5\" (1.537 m), weight 229 lb (103.9 kg), SpO2 98 %, not currently breastfeeding. .  She has a normal affect, no acute distress, appears well developed and well nourished. Neck:  neck- supple, no mass, non-tender and no bruits  Lungs:  Normal expansion. Clear to auscultation. No rales, rhonchi, or wheezing., No chest wall tenderness. Heart:  Heart sounds are normal.  Regular rate and rhythm without murmur, gallop or rub. Abdomen:  Soft, non-tender, normal bowel sounds. No bruits, organomegaly or masses. Extremities: Extremities warm to touch, pink, with no edema. Assessment:      Diagnosis Orders   1. Type 2 diabetes mellitus without complication, without long-term current use of insulin ([facility])  metFORMIN (GLUCOPHAGE) 850 MG tablet    glimepiride (AMARYL) 1 MG tablet    CBC Auto Differential    Comprehensive Metabolic Panel    Lipid Panel    Hemoglobin A1C    Microalbumin / Creatinine Urine Ratio   2. Essential hypertension  metoprolol-hydrochlorothiazide (LOPRESSOR HCT) 100-25 MG per tablet    losartan (COZAAR) 25 MG tablet   3. Low HDL (under 40)     4.  Mild intermittent asthma without complication montelukast (SINGULAIR) 10 MG tablet    VENTOLIN  (90 Base) MCG/ACT inhaler   5. Allergic rhinitis, unspecified seasonality, unspecified trigger     6. LYNNE on CPAP         PLAN: Include orders in the DX section. Diabetes Counseling   Patient was counseled regarding disease risks and adopting healthy behaviors. Patient was provided education materials to assist with self management. Patient was provided log (or received log during previous visit) to record blood pressure, food intake and/or blood sugar. Patient was instructed to keep log up-to-date and to always bring log to all office visits. Follow up: 3 months and as needed. hemoglobin a1c to be done in office. 1.  Diabetes is not at goal with hemoglobin A1c of 7.2. She is aware of necessary changes in her diet and will start working on those. Also plans to start increasing physical activity again soon. She is aware of need to hold metformin around the time of her CT scan. 2.  Blood pressure is well controlled on current medication. Continue the same. 3.  Discussed that increasing physical activity can help to improve HDL level. Other lipid panel is stable. 4.  5.  Symptoms have been well controlled with current medication. No side effects reported. She does have the CT scan of her lung scheduled for Thursday of this week and is worried about the findings. Reviewed chest x-ray results in detail. Reassurance given. 6.  She is compliant with CPAP therapy. Recently ordered new equipment. Please note this report has been partially produced using speech recognition software and may cause contain errors related to that system including grammar, punctuation and spelling as well as words and phrases that may seem inappropriate. If there are questions or concerns please feel free to contact me to clarify.           Electronically signed by Mitzi Hernandez, 11:49 AM 9/9/19

## 2019-09-11 ENCOUNTER — TELEPHONE (OUTPATIENT)
Dept: FAMILY MEDICINE CLINIC | Age: 64
End: 2019-09-11

## 2019-09-12 ENCOUNTER — HOSPITAL ENCOUNTER (OUTPATIENT)
Dept: CT IMAGING | Age: 64
Discharge: HOME | End: 2019-09-14
Payer: COMMERCIAL

## 2019-09-12 DIAGNOSIS — R93.89 ABNORMAL CXR: ICD-10-CM

## 2019-09-12 PROCEDURE — 6360000004 HC RX CONTRAST MEDICATION: Performed by: NURSE PRACTITIONER

## 2019-09-12 PROCEDURE — 71260 CT THORAX DX C+: CPT

## 2019-09-12 RX ADMIN — IOPAMIDOL 100 ML: 612 INJECTION, SOLUTION INTRAVENOUS at 08:22

## 2019-09-13 ENCOUNTER — TELEPHONE (OUTPATIENT)
Dept: FAMILY MEDICINE CLINIC | Age: 64
End: 2019-09-13

## 2019-09-13 DIAGNOSIS — I34.81 MITRAL ANNULAR CALCIFICATION: ICD-10-CM

## 2019-09-13 DIAGNOSIS — R93.89 ABNORMAL CT OF THE CHEST: Primary | ICD-10-CM

## 2019-09-19 ENCOUNTER — HOSPITAL ENCOUNTER (OUTPATIENT)
Dept: NON INVASIVE DIAGNOSTICS | Age: 64
Discharge: HOME | End: 2019-09-19
Payer: COMMERCIAL

## 2019-09-19 LAB
LV EF: 60 %
LVEF MODALITY: NORMAL

## 2019-09-19 PROCEDURE — 93306 TTE W/DOPPLER COMPLETE: CPT

## 2019-09-22 ENCOUNTER — APPOINTMENT (OUTPATIENT)
Dept: GENERAL RADIOLOGY | Age: 64
End: 2019-09-22
Payer: COMMERCIAL

## 2019-09-22 ENCOUNTER — HOSPITAL ENCOUNTER (EMERGENCY)
Age: 64
Discharge: HOME | End: 2019-09-22
Attending: EMERGENCY MEDICINE
Payer: COMMERCIAL

## 2019-09-22 VITALS
RESPIRATION RATE: 20 BRPM | BODY MASS INDEX: 44.17 KG/M2 | TEMPERATURE: 98.6 F | HEIGHT: 60 IN | HEART RATE: 78 BPM | DIASTOLIC BLOOD PRESSURE: 79 MMHG | OXYGEN SATURATION: 97 % | WEIGHT: 225 LBS | SYSTOLIC BLOOD PRESSURE: 176 MMHG

## 2019-09-22 DIAGNOSIS — R07.81 PLEURITIC CHEST PAIN: Primary | ICD-10-CM

## 2019-09-22 LAB
ALBUMIN SERPL-MCNC: 4.5 G/DL (ref 3.5–4.6)
ALP BLD-CCNC: 74 U/L (ref 40–130)
ALT SERPL-CCNC: 23 U/L (ref 0–33)
ANION GAP SERPL CALCULATED.3IONS-SCNC: 15 MEQ/L (ref 9–15)
APTT: 28.6 SEC (ref 24.4–36.8)
AST SERPL-CCNC: 27 U/L (ref 0–35)
BASOPHILS ABSOLUTE: 0 K/UL (ref 0–0.2)
BASOPHILS RELATIVE PERCENT: 0.3 %
BILIRUB SERPL-MCNC: 0.4 MG/DL (ref 0.2–0.7)
BUN BLDV-MCNC: 14 MG/DL (ref 8–23)
CALCIUM SERPL-MCNC: 9.8 MG/DL (ref 8.5–9.9)
CHLORIDE BLD-SCNC: 100 MEQ/L (ref 95–107)
CO2: 26 MEQ/L (ref 20–31)
CREAT SERPL-MCNC: 0.62 MG/DL (ref 0.5–0.9)
EKG ATRIAL RATE: 61 BPM
EKG P AXIS: 44 DEGREES
EKG P-R INTERVAL: 134 MS
EKG Q-T INTERVAL: 432 MS
EKG QRS DURATION: 88 MS
EKG QTC CALCULATION (BAZETT): 434 MS
EKG R AXIS: 29 DEGREES
EKG T AXIS: 8 DEGREES
EKG VENTRICULAR RATE: 61 BPM
EOSINOPHILS ABSOLUTE: 0.2 K/UL (ref 0–0.7)
EOSINOPHILS RELATIVE PERCENT: 2.3 %
GFR AFRICAN AMERICAN: >60
GFR NON-AFRICAN AMERICAN: >60
GLOBULIN: 3.2 G/DL (ref 2.3–3.5)
GLUCOSE BLD-MCNC: 98 MG/DL (ref 70–99)
HCT VFR BLD CALC: 43.3 % (ref 37–47)
HEMOGLOBIN: 14.1 G/DL (ref 12–16)
INR BLD: 0.9
LYMPHOCYTES ABSOLUTE: 1.9 K/UL (ref 1–4.8)
LYMPHOCYTES RELATIVE PERCENT: 26.5 %
MCH RBC QN AUTO: 28 PG (ref 27–31.3)
MCHC RBC AUTO-ENTMCNC: 32.6 % (ref 33–37)
MCV RBC AUTO: 85.9 FL (ref 82–100)
MONOCYTES ABSOLUTE: 0.6 K/UL (ref 0.2–0.8)
MONOCYTES RELATIVE PERCENT: 7.9 %
NEUTROPHILS ABSOLUTE: 4.5 K/UL (ref 1.4–6.5)
NEUTROPHILS RELATIVE PERCENT: 63 %
PDW BLD-RTO: 16.1 % (ref 11.5–14.5)
PLATELET # BLD: 326 K/UL (ref 130–400)
POTASSIUM SERPL-SCNC: 3.7 MEQ/L (ref 3.4–4.9)
PROTHROMBIN TIME: 12.6 SEC (ref 12.3–14.9)
RBC # BLD: 5.04 M/UL (ref 4.2–5.4)
SODIUM BLD-SCNC: 141 MEQ/L (ref 135–144)
TOTAL CK: 75 U/L (ref 0–170)
TOTAL PROTEIN: 7.7 G/DL (ref 6.3–8)
TROPONIN: <0.01 NG/ML (ref 0–0.01)
WBC # BLD: 7.1 K/UL (ref 4.8–10.8)

## 2019-09-22 PROCEDURE — 99285 EMERGENCY DEPT VISIT HI MDM: CPT

## 2019-09-22 PROCEDURE — 71045 X-RAY EXAM CHEST 1 VIEW: CPT

## 2019-09-22 PROCEDURE — 82550 ASSAY OF CK (CPK): CPT

## 2019-09-22 PROCEDURE — 93005 ELECTROCARDIOGRAM TRACING: CPT

## 2019-09-22 PROCEDURE — 84484 ASSAY OF TROPONIN QUANT: CPT

## 2019-09-22 PROCEDURE — 85025 COMPLETE CBC W/AUTO DIFF WBC: CPT

## 2019-09-22 PROCEDURE — 80053 COMPREHEN METABOLIC PANEL: CPT

## 2019-09-22 PROCEDURE — 85610 PROTHROMBIN TIME: CPT

## 2019-09-22 PROCEDURE — 36415 COLL VENOUS BLD VENIPUNCTURE: CPT

## 2019-09-22 PROCEDURE — 85730 THROMBOPLASTIN TIME PARTIAL: CPT

## 2019-09-22 ASSESSMENT — ENCOUNTER SYMPTOMS
BACK PAIN: 1
ABDOMINAL PAIN: 0
CHOKING: 1
COUGH: 1

## 2019-09-22 ASSESSMENT — PAIN DESCRIPTION - ONSET
ONSET: ON-GOING
ONSET: ON-GOING

## 2019-09-22 ASSESSMENT — PAIN SCALES - GENERAL
PAINLEVEL_OUTOF10: 2
PAINLEVEL_OUTOF10: 4

## 2019-09-22 ASSESSMENT — PAIN DESCRIPTION - ORIENTATION: ORIENTATION: RIGHT

## 2019-09-22 ASSESSMENT — PAIN DESCRIPTION - DESCRIPTORS: DESCRIPTORS: ACHING;PRESSURE;DULL

## 2019-09-22 ASSESSMENT — PAIN DESCRIPTION - LOCATION: LOCATION: CHEST;BACK;SHOULDER

## 2019-09-22 NOTE — ED PROVIDER NOTES
[oxycodone-acetaminophen]    FAMILY HISTORY       Family History   Problem Relation Age of Onset    Lung Cancer Mother     Diabetes Mother     Hypertension Mother     Diabetes Father     Hypertension Father     Hypertension Brother     Diabetes Brother     Breast Cancer Maternal Aunt           SOCIAL HISTORY       Social History     Socioeconomic History    Marital status: Single     Spouse name: None    Number of children: None    Years of education: None    Highest education level: None   Occupational History    None   Social Needs    Financial resource strain: None    Food insecurity:     Worry: None     Inability: None    Transportation needs:     Medical: None     Non-medical: None   Tobacco Use    Smoking status: Never Smoker    Smokeless tobacco: Never Used   Substance and Sexual Activity    Alcohol use: Yes     Comment: occassionally    Drug use: No    Sexual activity: Not Currently   Lifestyle    Physical activity:     Days per week: None     Minutes per session: None    Stress: None   Relationships    Social connections:     Talks on phone: None     Gets together: None     Attends Restoration service: None     Active member of club or organization: None     Attends meetings of clubs or organizations: None     Relationship status: None    Intimate partner violence:     Fear of current or ex partner: None     Emotionally abused: None     Physically abused: None     Forced sexual activity: None   Other Topics Concern    None   Social History Narrative    None       SCREENINGS      @FLOW([id number])@      PHYSICAL EXAM    (up to 7 for level 4, 8 or more for level 5)     ED Triage Vitals [09/22/19 1520]   BP Temp Temp Source Pulse Resp SpO2 Height Weight   (!) 176/79 98.6 °F (37 °C) Oral 78 20 97 % 5' (1.524 m) 225 lb (102.1 kg)       Physical Exam  This is a well-developed well-nourished patient without distress. Conjunctivae are clear. No visible sinus discharge.   No significant TempSrc: Oral   SpO2: 97%   Weight: 225 lb (102.1 kg)   Height: 5' (1.524 m)       Is pleuritic atypical right-sided chest pain clinically low risk for cardiac disease. Duration, ECG chest x-ray and cardiac enzymes show no evidence of cardiopulmonary source. Over-the-counter medication as needed for pain. Discussed with patient created understood at discharge. MDM    CRITICAL CARE TIME   Total Critical Care time was  minutes, excluding separately reportableprocedures. There was a high probability of clinicallysignificant/life threatening deterioration in the patient's condition which required my urgent intervention. CONSULTS:  None    PROCEDURES:  Unless otherwise noted below, none     Procedures    FINAL IMPRESSION      1.  Pleuritic chest pain        DISPOSITION/PLAN   DISPOSITION Decision To Discharge 09/22/2019 04:17:09 PM      PATIENT REFERRED TO:  PRASHANT Valdez CNP  [facility] [address]  [phone]      As needed      DISCHARGE MEDICATIONS:  New Prescriptions    No medications on file          (Please note that portions of this note were completed with a voice recognitionprogram.  Efforts were made to edit the dictations but occasionally words are mis-transcribed.)    Kj Sam MD (electronically signed)  Attending Emergency Physician        Javier Parker MD  09/22/19 [id number]

## 2019-09-22 NOTE — ED NOTES
CXR completed at bedside, Pt aware of plan of care. Covered with warm blanket.      Vanessa Cordon RN  09/22/19 0325

## 2019-09-23 ENCOUNTER — TELEPHONE (OUTPATIENT)
Dept: FAMILY MEDICINE CLINIC | Age: 64
End: 2019-09-23

## 2019-09-23 DIAGNOSIS — R93.1 ABNORMAL ECHOCARDIOGRAM: Primary | ICD-10-CM

## 2019-09-23 PROCEDURE — 93010 ELECTROCARDIOGRAM REPORT: CPT | Performed by: INTERNAL MEDICINE

## 2019-10-17 ENCOUNTER — OFFICE VISIT (OUTPATIENT)
Dept: CARDIOLOGY CLINIC | Age: 64
End: 2019-10-17
Payer: COMMERCIAL

## 2019-10-17 VITALS
SYSTOLIC BLOOD PRESSURE: 132 MMHG | DIASTOLIC BLOOD PRESSURE: 88 MMHG | BODY MASS INDEX: 44.33 KG/M2 | HEART RATE: 67 BPM | RESPIRATION RATE: 16 BRPM | OXYGEN SATURATION: 99 % | WEIGHT: 227 LBS

## 2019-10-17 DIAGNOSIS — I10 ESSENTIAL HYPERTENSION: Primary | ICD-10-CM

## 2019-10-17 DIAGNOSIS — R07.9 CHEST PAIN, UNSPECIFIED TYPE: ICD-10-CM

## 2019-10-17 PROCEDURE — 99244 OFF/OP CNSLTJ NEW/EST MOD 40: CPT | Performed by: INTERNAL MEDICINE

## 2019-10-27 ASSESSMENT — ENCOUNTER SYMPTOMS
BACK PAIN: 0
COLOR CHANGE: 0
ABDOMINAL DISTENTION: 0
CHOKING: 0
SHORTNESS OF BREATH: 0
APNEA: 0
CHEST TIGHTNESS: 1
ABDOMINAL PAIN: 0

## 2019-10-28 ENCOUNTER — TELEPHONE (OUTPATIENT)
Dept: FAMILY MEDICINE CLINIC | Age: 64
End: 2019-10-28

## 2019-10-29 ENCOUNTER — HOSPITAL ENCOUNTER (OUTPATIENT)
Dept: NON INVASIVE DIAGNOSTICS | Age: 64
Discharge: HOME | End: 2019-10-29
Payer: COMMERCIAL

## 2019-10-29 DIAGNOSIS — R07.9 CHEST PAIN, UNSPECIFIED TYPE: ICD-10-CM

## 2019-10-29 PROCEDURE — 93017 CV STRESS TEST TRACING ONLY: CPT

## 2019-10-29 PROCEDURE — 93350 STRESS TTE ONLY: CPT

## 2019-11-14 ENCOUNTER — OFFICE VISIT (OUTPATIENT)
Dept: CARDIOLOGY CLINIC | Age: 64
End: 2019-11-14
Payer: COMMERCIAL

## 2019-11-14 VITALS
OXYGEN SATURATION: 98 % | WEIGHT: 226 LBS | SYSTOLIC BLOOD PRESSURE: 132 MMHG | BODY MASS INDEX: 44.37 KG/M2 | DIASTOLIC BLOOD PRESSURE: 82 MMHG | HEIGHT: 60 IN | HEART RATE: 73 BPM | RESPIRATION RATE: 18 BRPM

## 2019-11-14 DIAGNOSIS — I10 ESSENTIAL HYPERTENSION: Primary | ICD-10-CM

## 2019-11-14 DIAGNOSIS — R07.9 CHEST PAIN, UNSPECIFIED TYPE: ICD-10-CM

## 2019-11-14 PROCEDURE — 99213 OFFICE O/P EST LOW 20 MIN: CPT | Performed by: INTERNAL MEDICINE

## 2019-12-16 ENCOUNTER — OFFICE VISIT (OUTPATIENT)
Dept: FAMILY MEDICINE CLINIC | Age: 64
End: 2019-12-16
Payer: COMMERCIAL

## 2019-12-16 VITALS
DIASTOLIC BLOOD PRESSURE: 80 MMHG | RESPIRATION RATE: 16 BRPM | SYSTOLIC BLOOD PRESSURE: 130 MMHG | TEMPERATURE: 96.8 F | HEART RATE: 59 BPM | WEIGHT: 222 LBS | OXYGEN SATURATION: 98 % | HEIGHT: 60 IN | BODY MASS INDEX: 43.59 KG/M2

## 2019-12-16 DIAGNOSIS — I10 ESSENTIAL HYPERTENSION: ICD-10-CM

## 2019-12-16 DIAGNOSIS — E11.9 TYPE 2 DIABETES MELLITUS WITHOUT COMPLICATION, WITHOUT LONG-TERM CURRENT USE OF INSULIN (HCC): Primary | ICD-10-CM

## 2019-12-16 DIAGNOSIS — R07.89 ATYPICAL CHEST PAIN: ICD-10-CM

## 2019-12-16 DIAGNOSIS — Z12.39 BREAST CANCER SCREENING: ICD-10-CM

## 2019-12-16 DIAGNOSIS — J45.20 MILD INTERMITTENT ASTHMA WITHOUT COMPLICATION: ICD-10-CM

## 2019-12-16 DIAGNOSIS — J30.9 ALLERGIC RHINITIS, UNSPECIFIED SEASONALITY, UNSPECIFIED TRIGGER: ICD-10-CM

## 2019-12-16 DIAGNOSIS — Z99.89 OSA ON CPAP: ICD-10-CM

## 2019-12-16 DIAGNOSIS — I34.81 MITRAL ANNULAR CALCIFICATION: ICD-10-CM

## 2019-12-16 DIAGNOSIS — G47.33 OSA ON CPAP: ICD-10-CM

## 2019-12-16 DIAGNOSIS — I07.1 MILD TRICUSPID REGURGITATION: ICD-10-CM

## 2019-12-16 LAB — HBA1C MFR BLD: 6.5 %

## 2019-12-16 PROCEDURE — 83036 HEMOGLOBIN GLYCOSYLATED A1C: CPT | Performed by: NURSE PRACTITIONER

## 2019-12-16 PROCEDURE — 99214 OFFICE O/P EST MOD 30 MIN: CPT | Performed by: NURSE PRACTITIONER

## 2019-12-16 ASSESSMENT — PATIENT HEALTH QUESTIONNAIRE - PHQ9
SUM OF ALL RESPONSES TO PHQ QUESTIONS 1-9: 0
SUM OF ALL RESPONSES TO PHQ9 QUESTIONS 1 & 2: 0
1. LITTLE INTEREST OR PLEASURE IN DOING THINGS: 0
SUM OF ALL RESPONSES TO PHQ QUESTIONS 1-9: 0
2. FEELING DOWN, DEPRESSED OR HOPELESS: 0

## 2019-12-23 ENCOUNTER — HOSPITAL ENCOUNTER (OUTPATIENT)
Dept: WOMENS IMAGING | Age: 64
Discharge: HOME | End: 2019-12-25
Payer: COMMERCIAL

## 2019-12-23 DIAGNOSIS — Z12.39 BREAST CANCER SCREENING: ICD-10-CM

## 2019-12-23 PROCEDURE — 77067 SCR MAMMO BI INCL CAD: CPT

## 2020-02-13 NOTE — TELEPHONE ENCOUNTER
Pharmacy is requesting medication refill.  Please approve or deny this request.    Rx requested:  Requested Prescriptions     Pending Prescriptions Disp Refills    metFORMIN (GLUCOPHAGE) 850 MG tablet [Pharmacy Med Name: METFORMIN HCL TABS 850MG] 180 tablet 4     Sig: TAKE 1 TABLET TWICE A DAY WITH MEALS (NEED APPOINTMENT FOR FURTHER REFILLS)         Last Office Visit:   12/16/2019      Next Visit Date:  Future Appointments   Date Time Provider Lori Mckee   5/14/2020  3:30 PM Rosemarie Gracia MD [facility] [place]   6/16/2020  7:50 AM PRASHANT Pandya - CNP [place] [id number]

## 2020-06-15 ENCOUNTER — VIRTUAL VISIT (OUTPATIENT)
Dept: FAMILY MEDICINE CLINIC | Age: 65
End: 2020-06-15
Payer: COMMERCIAL

## 2020-06-15 PROCEDURE — 99214 OFFICE O/P EST MOD 30 MIN: CPT | Performed by: NURSE PRACTITIONER

## 2020-06-15 RX ORDER — MONTELUKAST SODIUM 10 MG/1
10 TABLET ORAL NIGHTLY
Qty: 90 TABLET | Refills: 2 | Status: SHIPPED | OUTPATIENT
Start: 2020-06-15 | End: 2021-02-08 | Stop reason: SDUPTHER

## 2020-06-15 RX ORDER — FLUTICASONE PROPIONATE 50 MCG
2 SPRAY, SUSPENSION (ML) NASAL DAILY
Qty: 3 BOTTLE | Refills: 1
Start: 2020-06-15 | End: 2022-01-28

## 2020-06-15 RX ORDER — GLIMEPIRIDE 1 MG/1
TABLET ORAL
Qty: 90 TABLET | Refills: 2 | Status: SHIPPED | OUTPATIENT
Start: 2020-06-15 | End: 2021-02-08 | Stop reason: SDUPTHER

## 2020-06-15 RX ORDER — METOPROLOL TARTRATE AND HYDROCHLOROTHIAZIDE 100; 25 MG/1; MG/1
1 TABLET ORAL DAILY
Qty: 90 TABLET | Refills: 2 | Status: SHIPPED | OUTPATIENT
Start: 2020-06-15 | End: 2021-02-08 | Stop reason: SDUPTHER

## 2020-06-15 RX ORDER — BUDESONIDE 0.25 MG/2ML
250 INHALANT ORAL 2 TIMES DAILY
Qty: 60 AMPULE | Refills: 3 | Status: SHIPPED | OUTPATIENT
Start: 2020-06-15 | End: 2020-10-01 | Stop reason: ALTCHOICE

## 2020-06-15 RX ORDER — CETIRIZINE HYDROCHLORIDE 10 MG/1
10 TABLET ORAL DAILY
Qty: 30 TABLET | Refills: 0
Start: 2020-06-15 | End: 2020-07-15

## 2020-06-15 RX ORDER — LOSARTAN POTASSIUM 25 MG/1
25 TABLET ORAL DAILY
Qty: 90 TABLET | Refills: 2 | Status: SHIPPED | OUTPATIENT
Start: 2020-06-15 | End: 2021-02-08 | Stop reason: SDUPTHER

## 2020-06-15 NOTE — PROGRESS NOTES
the following concern(s):    Diabetes: sugars have been running higher during pandemic. Has recently started weight watchers and lost 10 lbs. Sometimes eating less will lead to increased sugar levels. She is trying to figure out the right amount of food eat during the day and states that she has not had any significantly low or high readings lately. Continues to take medication as prescribed with no known side effects. HTN/hyperlipidemia: She states that she continues to consume a low-salt diet and has been physically active working around her yard outside. Is not sure what her blood pressure has been running but she has not had any symptoms. Asthma: uses nebulizer at least once daily but typically not twice daily. Occasional wheezing but no significant breathing issues. She has been having some allergy symptoms and switched antihistamines to Zyrtec and has been using Flonase daily which has been somewhat helpful. States that she has had allergy testing in the past and was noted to be allergic to \"just about everything\". Dysthymia:  Aida Garcia reports being in a fair mood that is stable. The patient is not reporting insomnia, difficulty concentrating and usual interest in activities. This patient is not homicidal or suicidal.  She states that she recently had a depression screening done through her insurance and was told that she had some mild depression. This was over a month ago and she feels that things have been better as she has been able to get out of her house and see more people. Does not feel that she has any chronic or ongoing issues with down mood or depression. Chronic low back pain/right sciatica: She states that she continues to have issues with chronic low back pain that occasionally seems to affect the right sciatic nerve. She does have a history of degenerative disc disease and some curvature of the spine.   States that pain is still tolerable but she wanted to mention because it partially produced using speech recognition software and may cause contain errors related to that system including grammar, punctuation and spelling as well as words and phrases that may seem inappropriate. If there are questions or concerns please feel free to contact me to clarify. An  electronic signature was used to authenticate this note. --PRASHANT De La Vega - CNP on 6/15/2020 at 8:25 AM        Pursuant to the emergency declaration under the [facility], [facility] waiver authority and the [employer] and [employer] Act, this Virtual  Visit was conducted, with patient's consent, to reduce the patient's risk of exposure to COVID-19 and provide continuity of care for an established patient. Services were provided through a video synchronous discussion virtually to substitute for in-person clinic visit.

## 2020-09-23 ENCOUNTER — TELEPHONE (OUTPATIENT)
Dept: FAMILY MEDICINE CLINIC | Age: 65
End: 2020-09-23

## 2020-09-23 DIAGNOSIS — E11.9 TYPE 2 DIABETES MELLITUS WITHOUT COMPLICATION, WITHOUT LONG-TERM CURRENT USE OF INSULIN (HCC): Primary | ICD-10-CM

## 2020-09-23 NOTE — TELEPHONE ENCOUNTER
Patient called in to get all her diabetic labs ordered asap so she could get those done before her appt on oct 1st

## 2020-09-25 ENCOUNTER — HOSPITAL ENCOUNTER (OUTPATIENT)
Dept: LAB | Age: 65
Discharge: HOME | End: 2020-09-25
Payer: MEDICARE

## 2020-09-25 LAB
ALBUMIN SERPL-MCNC: 4.2 G/DL (ref 3.5–4.6)
ALP BLD-CCNC: 81 U/L (ref 40–130)
ALT SERPL-CCNC: 27 U/L (ref 0–33)
ANION GAP SERPL CALCULATED.3IONS-SCNC: 10 MEQ/L (ref 9–15)
AST SERPL-CCNC: 26 U/L (ref 0–35)
BASOPHILS ABSOLUTE: 0 K/UL (ref 0–0.2)
BASOPHILS RELATIVE PERCENT: 0.3 %
BILIRUB SERPL-MCNC: 0.4 MG/DL (ref 0.2–0.7)
BUN BLDV-MCNC: 15 MG/DL (ref 8–23)
CALCIUM SERPL-MCNC: 8.9 MG/DL (ref 8.5–9.9)
CHLORIDE BLD-SCNC: 102 MEQ/L (ref 95–107)
CHOLESTEROL, TOTAL: 163 MG/DL (ref 0–199)
CO2: 27 MEQ/L (ref 20–31)
CREAT SERPL-MCNC: 0.58 MG/DL (ref 0.5–0.9)
CREATININE URINE: 57.8 MG/DL
EOSINOPHILS ABSOLUTE: 0.2 K/UL (ref 0–0.7)
EOSINOPHILS RELATIVE PERCENT: 2.2 %
GFR AFRICAN AMERICAN: >60
GFR NON-AFRICAN AMERICAN: >60
GLOBULIN: 2.7 G/DL (ref 2.3–3.5)
GLUCOSE BLD-MCNC: 199 MG/DL (ref 70–99)
HBA1C MFR BLD: 8.3 % (ref 4.8–5.9)
HCT VFR BLD CALC: 43.4 % (ref 37–47)
HDLC SERPL-MCNC: 40 MG/DL (ref 40–59)
HEMOGLOBIN: 14.5 G/DL (ref 12–16)
LDL CHOLESTEROL CALCULATED: 98 MG/DL (ref 0–129)
LYMPHOCYTES ABSOLUTE: 1.8 K/UL (ref 1–4.8)
LYMPHOCYTES RELATIVE PERCENT: 21.7 %
MCH RBC QN AUTO: 29.8 PG (ref 27–31.3)
MCHC RBC AUTO-ENTMCNC: 33.3 % (ref 33–37)
MCV RBC AUTO: 89.6 FL (ref 82–100)
MICROALBUMIN UR-MCNC: 4.2 MG/DL
MICROALBUMIN/CREAT UR-RTO: 72.7 MG/G (ref 0–30)
MONOCYTES ABSOLUTE: 0.5 K/UL (ref 0.2–0.8)
MONOCYTES RELATIVE PERCENT: 6.5 %
NEUTROPHILS ABSOLUTE: 5.8 K/UL (ref 1.4–6.5)
NEUTROPHILS RELATIVE PERCENT: 69.3 %
PDW BLD-RTO: 14.7 % (ref 11.5–14.5)
PLATELET # BLD: 312 K/UL (ref 130–400)
POTASSIUM SERPL-SCNC: 4 MEQ/L (ref 3.4–4.9)
RBC # BLD: 4.85 M/UL (ref 4.2–5.4)
SODIUM BLD-SCNC: 139 MEQ/L (ref 135–144)
TOTAL PROTEIN: 6.9 G/DL (ref 6.3–8)
TRIGL SERPL-MCNC: 123 MG/DL (ref 0–150)
WBC # BLD: 8.4 K/UL (ref 4.8–10.8)

## 2020-09-25 PROCEDURE — 82570 ASSAY OF URINE CREATININE: CPT

## 2020-09-25 PROCEDURE — 80061 LIPID PANEL: CPT

## 2020-09-25 PROCEDURE — 85025 COMPLETE CBC W/AUTO DIFF WBC: CPT

## 2020-09-25 PROCEDURE — 80053 COMPREHEN METABOLIC PANEL: CPT

## 2020-09-25 PROCEDURE — 83036 HEMOGLOBIN GLYCOSYLATED A1C: CPT

## 2020-09-25 PROCEDURE — 36415 COLL VENOUS BLD VENIPUNCTURE: CPT

## 2020-09-25 PROCEDURE — 82043 UR ALBUMIN QUANTITATIVE: CPT

## 2020-10-01 ENCOUNTER — VIRTUAL VISIT (OUTPATIENT)
Dept: FAMILY MEDICINE CLINIC | Age: 65
End: 2020-10-01
Payer: MEDICARE

## 2020-10-01 PROBLEM — E66.01 MORBIDLY OBESE (HCC): Status: ACTIVE | Noted: 2020-10-01

## 2020-10-01 PROCEDURE — 1123F ACP DISCUSS/DSCN MKR DOCD: CPT | Performed by: NURSE PRACTITIONER

## 2020-10-01 PROCEDURE — G8400 PT W/DXA NO RESULTS DOC: HCPCS | Performed by: NURSE PRACTITIONER

## 2020-10-01 PROCEDURE — 3017F COLORECTAL CA SCREEN DOC REV: CPT | Performed by: NURSE PRACTITIONER

## 2020-10-01 PROCEDURE — 1090F PRES/ABSN URINE INCON ASSESS: CPT | Performed by: NURSE PRACTITIONER

## 2020-10-01 PROCEDURE — 3052F HG A1C>EQUAL 8.0%<EQUAL 9.0%: CPT | Performed by: NURSE PRACTITIONER

## 2020-10-01 PROCEDURE — 2022F DILAT RTA XM EVC RTNOPTHY: CPT | Performed by: NURSE PRACTITIONER

## 2020-10-01 PROCEDURE — 4040F PNEUMOC VAC/ADMIN/RCVD: CPT | Performed by: NURSE PRACTITIONER

## 2020-10-01 PROCEDURE — 99214 OFFICE O/P EST MOD 30 MIN: CPT | Performed by: NURSE PRACTITIONER

## 2020-10-01 PROCEDURE — G8427 DOCREV CUR MEDS BY ELIG CLIN: HCPCS | Performed by: NURSE PRACTITIONER

## 2020-10-01 RX ORDER — FLUTICASONE PROPIONATE 110 UG/1
2 AEROSOL, METERED RESPIRATORY (INHALATION) 2 TIMES DAILY
Qty: 1 INHALER | Refills: 3 | Status: SHIPPED | OUTPATIENT
Start: 2020-10-01 | End: 2021-02-08 | Stop reason: SDUPTHER

## 2020-10-01 RX ORDER — RAMELTEON 8 MG/1
8 TABLET ORAL NIGHTLY PRN
Qty: 30 TABLET | Refills: 3 | Status: SHIPPED | OUTPATIENT
Start: 2020-10-01 | End: 2021-02-08 | Stop reason: ALTCHOICE

## 2020-10-01 ASSESSMENT — PATIENT HEALTH QUESTIONNAIRE - PHQ9
SUM OF ALL RESPONSES TO PHQ QUESTIONS 1-9: 2
1. LITTLE INTEREST OR PLEASURE IN DOING THINGS: 1
2. FEELING DOWN, DEPRESSED OR HOPELESS: 1
SUM OF ALL RESPONSES TO PHQ9 QUESTIONS 1 & 2: 2
SUM OF ALL RESPONSES TO PHQ QUESTIONS 1-9: 2

## 2020-10-01 NOTE — PROGRESS NOTES
10/1/2020    TELEHEALTH EVALUATION -- Audio/Visual (During SVSQZ-17 public health emergency)    Due to COVID 19 outbreak, patient's office visit was converted to a virtual visit. Patient was contacted and agreed to proceed with a virtual visit via D[employer]. me  The risks and benefits of converting to a virtual visit were discussed in light of the current infectious disease epidemic. Patient also understood that insurance coverage and co-pays are up to their individual insurance plans. Mumtaz Manrique is a 72 y.o. female being evaluated by a Virtual Visit (video visit) encounter to address concerns as mentioned above. A caregiver was present when appropriate. Due to this being a TeleHealth encounter (During LOVST-30 public health emergency), evaluation of the following organ systems was limited: Vitals/Constitutional/EENT/Resp/CV/GI//MS/Neuro/Skin/Heme-Lymph-Imm. Pursuant to the emergency declaration under the [address] [place] [address] authority and the [employer] and [employer] Act, this Virtual Visit was conducted with patient's (and/or legal guardian's) consent, to reduce the patient's risk of exposure to COVID-19 and provide necessary medical care. The patient (and/or legal guardian) has also been advised to contact this office for worsening conditions or problems, and seek emergency medical treatment and/or call 911 if deemed necessary. Patient identification was verified at the start of the visit: Yes    Total time spent for this encounter: Not billed by time    Services were provided through a video synchronous discussion virtually to substitute for in-person clinic visit. Patient and provider were located at their individual homes. The patient is talking with me virtually from her home and I am located at my office in [facility]. New insurance: [employer].       HPI:    Mumtaz Manrique (:  1955) has requested an audio/video evaluation for the following concern(s):issues with asthma and review labs. Diabetes. HTN: She states that she has not been following her diet very well. Admits to being more stressed out during the pandemic and with what is going on in the world with politics. Admits to eating a lot of baked foods that she enjoys cooking. Knows that her sugar levels will be up some. No symptoms associated with diabetes other than she does report having frequent vaginal yeast infections. She is not sure if this could be connected. She states that she continues to consume low-salt diet however. Has not been getting much for physical activity. Taking medication without side effects. She feels that she has also gained about 20 lbs during the pandemic. Asthma: She states that she has been having issues more lately with her breathing. Has noticed that she can hear wheezing especially during exhalation. Has been taking Pulmicort nebulizer but does not feel that it is nearly as effective as the Qvar inhaler used to be for her. She is taking antihistamine and Singulair. Admits that allergy symptoms have been worse this year. Sleep apnea: Mukund Whiting has obstructive sleep apnea that is treated with CPAP. The CPAP is used  8 hours 7 nights per week. The CPAP use helps the daytime sleepiness and low energy levels. Insomnia: She states that she has been having issues falling asleep lately. Sometimes lays awake until about 4:00 in the morning. Does nap throughout the day and states that she also wears her CPAP even while napping. She has tried melatonin over-the-counter with some relief in symptoms but continues to have issues sleeping throughout the night. She denies any down or depressed moods. Has not felt overly anxious. Dysphagia: She states that she has been having issues on a rare occasion with swallowing foods/fluids. Feels that her throat is tight at times.   This does not happen all the time. She does not feel that food gets stuck in her esophagus after swallowing it. Can be thin liquids or even meat that she feels she has to really focus on swallowing. She does not have any issues with recurrent coughing after eating. Denies any change in voice. She states that the symptom is not very severe or bothersome but she wanted to mention since it is something different. Review of Systems   This patient reports no chest pain or pressure. No heart palpitations. No sputum production. The patient reports no nausea or vomiting. There is no heartburn or indigestion. There is no diarrhea or constipation. No black, bloody, mucusy or tarry stool noticed. The patient reports no bloating and no change in appetite. There is no numbness, tingling or swelling in the extremities. Prior to Visit Medications    Medication Sig Taking?  Authorizing Provider   ramelteon (ROZEREM) 8 MG tablet Take 1 tablet by mouth nightly as needed for Sleep Yes PRASHANT Melendrez CNP   fluticasone (FLOVENT HFA) 110 MCG/ACT inhaler Inhale 2 puffs into the lungs 2 times daily Yes PRASHANT Melendrez CNP   metFORMIN (GLUCOPHAGE) 850 MG tablet TAKE 1 TABLET TWICE A DAY WITH MEALS (NEED APPOINTMENT FOR FURTHER REFILLS) Yes PRASHANT Nava CNP   metoprolol-hydrochlorothiazide (LOPRESSOR HCT) 100-25 MG per tablet Take 1 tablet by mouth daily Yes PRASHANT Melendrez CNP   montelukast (SINGULAIR) 10 MG tablet Take 1 tablet by mouth nightly Yes PRASHANT Melendrez CNP   losartan (COZAAR) 25 MG tablet Take 1 tablet by mouth daily Yes PRASHANT Melendrez CNP   glimepiride (AMARYL) 1 MG tablet TAKE 1 TABLET BY MOUTH  EVERY MORNING BEFORE  BREAKFAST Yes PRASHANT Nava CNP   VENTOLIN  (90 Base) MCG/ACT inhaler INHALE 2 PUFFS EVERY 4 HOURS AS NEEDED Yes PRASHANT Nava CNP   fluticasone (FLONASE) 50 MCG/ACT nasal spray 2 sprays by Each Nostril route daily Yes PRASHANT العلي - CNP   Omega-3 Fatty Acids (FISH OIL) 1200 MG CAPS Take by mouth Yes Historical Provider, MD   Multiple Vitamins-Minerals (MULTIVITAMIN & MINERAL PO) Take by mouth Yes Historical Provider, MD   Calcium Carb-Cholecalciferol (CALCIUM CARBONATE-VITAMIN D3 PO) Take by mouth Yes Historical Provider, MD   aspirin 81 MG tablet Take 81 mg by mouth Yes Historical Provider, MD       Social History     Tobacco Use    Smoking status: Never Smoker    Smokeless tobacco: Never Used   Substance Use Topics    Alcohol use: Yes     Comment: occassionally    Drug use: No        PHYSICAL EXAMINATION:  [ INSTRUCTIONS:  \"[x]\" Indicates a positive item  \"[]\" Indicates a negative item  -- DELETE ALL ITEMS NOT EXAMINED]  [x] Alert  [x] Oriented to person/place/time    [x] No apparent distress  [] Toxic appearing    [] Face flushed appearing [] Sclera clear  [] Lips are cyanotic      [x] Breathing appears normal  [] Appears tachypneic      [] Rash on visible skin    [x] Cranial Nerves II-XII grossly intact    [x] Motor grossly intact in visible upper extremities    [] Motor grossly intact in visible lower extremities    [x] Normal Mood  [] Anxious appearing    [] Depressed appearing  [] Confused appearing      [] Poor short term memory  [] Poor long term memory    [] OTHER:      Due to this being a TeleHealth encounter, evaluation of the following organ systems is limited: Vitals/Constitutional/EENT/Resp/CV/GI//MS/Neuro/Skin/Heme-Lymph-Imm. ASSESSMENT/PLAN:   Diagnosis Orders   1. Type 2 diabetes mellitus without complication, without long-term current use of insulin (HCC)  CBC Auto Differential    Comprehensive Metabolic Panel    Lipid Panel    Hemoglobin A1C    Microalbumin / Creatinine Urine Ratio   2. Essential hypertension     3. Morbidly obese ([address]     4. Moderate persistent asthma with acute exacerbation  fluticasone (FLOVENT HFA) 110 MCG/ACT inhaler   5. LYNNE on CPAP     6.  Insomnia, unspecified type  ramelteon (ROZEREM) 8 MG tablet   7. Oropharyngeal dysphagia     8. Encounter for screening mammogram for breast cancer  JAMILA DIGITAL SCREEN W OR WO CAD BILATERAL         Return in about 4 months (around 2/1/2021) for diabetes- .     1. Hemoglobin A1c is now over 8. Had been 6.5 with last check in December. She admits to weight gain as well as not following a very healthy diet. She knows what she has to do to make improvements in her glucose levels. Will continue to take current medication and I do recommend checking blood work in 4 months rather than 6 months. She verbalizes understanding. 2.  She states that blood pressure has been okay. Continues to take medication without side effect and low-salt diet. 4.  We will try inhaled corticosteroid inhaler. Patient has new insurance that may cover Flovent better than Qvar. Prescription given and she will notify me if any issues getting the medication. Pulmicort has not been effective. 5.  Continue current CPAP. She has not had any issues with it. 6.  Recommend trying Rozerem to help with sleep. Discussed mechanism of action. Notify me if any issues or if she does not tolerate. 7.  Discussed possible causes of dysphasia. Recommend better treating asthma first but if symptoms do not improve, would recommend additional testing such as ultrasound of the neck to check on thyroid as well as swallow evaluation. She verbalizes understanding and will notify me if no improvement in symptoms over the next couple of weeks. 8.  Discussed recommendation for routine mammogram in order given. Please note this report has been partially produced using speech recognition software and may cause contain errors related to that system including grammar, punctuation and spelling as well as words and phrases that may seem inappropriate. If there are questions or concerns please feel free to contact me to clarify.         An  electronic signature was used to authenticate this note.    --Gerardo Hopkins, PRASHANT - CNP on 10/1/2020 at 1:19 PM        Pursuant to the emergency declaration under the [address] [place] [address], formerly [facility] waiver authority and the [employer] and [employer] Act, this Virtual  Visit was conducted, with patient's consent, to reduce the patient's risk of exposure to COVID-19 and provide continuity of care for an established patient. Services were provided through a video synchronous discussion virtually to substitute for in-person clinic visit.

## 2020-12-23 ENCOUNTER — HOSPITAL ENCOUNTER (OUTPATIENT)
Dept: WOMENS IMAGING | Age: 65
Discharge: HOME | End: 2020-12-25
Payer: MEDICARE

## 2020-12-23 PROCEDURE — 77063 BREAST TOMOSYNTHESIS BI: CPT

## 2021-02-03 ENCOUNTER — HOSPITAL ENCOUNTER (OUTPATIENT)
Dept: LAB | Age: 66
Discharge: HOME | End: 2021-02-03
Payer: MEDICARE

## 2021-02-03 DIAGNOSIS — E11.9 TYPE 2 DIABETES MELLITUS WITHOUT COMPLICATION, WITHOUT LONG-TERM CURRENT USE OF INSULIN (HCC): ICD-10-CM

## 2021-02-03 LAB
ALBUMIN SERPL-MCNC: 4.1 G/DL (ref 3.5–4.6)
ALP BLD-CCNC: 85 U/L (ref 40–130)
ALT SERPL-CCNC: 24 U/L (ref 0–33)
ANION GAP SERPL CALCULATED.3IONS-SCNC: 13 MEQ/L (ref 9–15)
AST SERPL-CCNC: 19 U/L (ref 0–35)
BASOPHILS ABSOLUTE: 0 K/UL (ref 0–0.2)
BASOPHILS RELATIVE PERCENT: 0.3 %
BILIRUB SERPL-MCNC: 0.5 MG/DL (ref 0.2–0.7)
BUN BLDV-MCNC: 10 MG/DL (ref 8–23)
CALCIUM SERPL-MCNC: 9.3 MG/DL (ref 8.5–9.9)
CHLORIDE BLD-SCNC: 103 MEQ/L (ref 95–107)
CHOLESTEROL, TOTAL: 152 MG/DL (ref 0–199)
CO2: 27 MEQ/L (ref 20–31)
CREAT SERPL-MCNC: 0.61 MG/DL (ref 0.5–0.9)
CREATININE URINE: 334.4 MG/DL
EOSINOPHILS ABSOLUTE: 0.2 K/UL (ref 0–0.7)
EOSINOPHILS RELATIVE PERCENT: 2.8 %
GFR AFRICAN AMERICAN: >60
GFR NON-AFRICAN AMERICAN: >60
GLOBULIN: 2.9 G/DL (ref 2.3–3.5)
GLUCOSE BLD-MCNC: 222 MG/DL (ref 70–99)
HBA1C MFR BLD: 8.4 % (ref 4.8–5.9)
HCT VFR BLD CALC: 45 % (ref 37–47)
HDLC SERPL-MCNC: 43 MG/DL (ref 40–59)
HEMOGLOBIN: 15.1 G/DL (ref 12–16)
LDL CHOLESTEROL CALCULATED: 86 MG/DL (ref 0–129)
LYMPHOCYTES ABSOLUTE: 2 K/UL (ref 1–4.8)
LYMPHOCYTES RELATIVE PERCENT: 25.7 %
MCH RBC QN AUTO: 30 PG (ref 27–31.3)
MCHC RBC AUTO-ENTMCNC: 33.6 % (ref 33–37)
MCV RBC AUTO: 89.3 FL (ref 82–100)
MICROALBUMIN UR-MCNC: 3.5 MG/DL
MICROALBUMIN/CREAT UR-RTO: 10.5 MG/G (ref 0–30)
MONOCYTES ABSOLUTE: 0.6 K/UL (ref 0.2–0.8)
MONOCYTES RELATIVE PERCENT: 8.2 %
NEUTROPHILS ABSOLUTE: 4.8 K/UL (ref 1.4–6.5)
NEUTROPHILS RELATIVE PERCENT: 63 %
PDW BLD-RTO: 14.8 % (ref 11.5–14.5)
PLATELET # BLD: 284 K/UL (ref 130–400)
POTASSIUM SERPL-SCNC: 3.7 MEQ/L (ref 3.4–4.9)
RBC # BLD: 5.04 M/UL (ref 4.2–5.4)
SODIUM BLD-SCNC: 143 MEQ/L (ref 135–144)
TOTAL PROTEIN: 7 G/DL (ref 6.3–8)
TRIGL SERPL-MCNC: 114 MG/DL (ref 0–150)
WBC # BLD: 7.6 K/UL (ref 4.8–10.8)

## 2021-02-03 PROCEDURE — 82043 UR ALBUMIN QUANTITATIVE: CPT

## 2021-02-03 PROCEDURE — 80061 LIPID PANEL: CPT

## 2021-02-03 PROCEDURE — 80053 COMPREHEN METABOLIC PANEL: CPT

## 2021-02-03 PROCEDURE — 82570 ASSAY OF URINE CREATININE: CPT

## 2021-02-03 PROCEDURE — 85025 COMPLETE CBC W/AUTO DIFF WBC: CPT

## 2021-02-03 PROCEDURE — 36415 COLL VENOUS BLD VENIPUNCTURE: CPT

## 2021-02-03 PROCEDURE — 83036 HEMOGLOBIN GLYCOSYLATED A1C: CPT

## 2021-02-08 ENCOUNTER — VIRTUAL VISIT (OUTPATIENT)
Dept: FAMILY MEDICINE CLINIC | Age: 66
End: 2021-02-08
Payer: MEDICARE

## 2021-02-08 DIAGNOSIS — J30.89 SEASONAL ALLERGIC RHINITIS DUE TO OTHER ALLERGIC TRIGGER: ICD-10-CM

## 2021-02-08 DIAGNOSIS — G62.9 NEUROPATHY: ICD-10-CM

## 2021-02-08 DIAGNOSIS — R09.82 POST-NASAL DRIP: ICD-10-CM

## 2021-02-08 DIAGNOSIS — R13.12 OROPHARYNGEAL DYSPHAGIA: ICD-10-CM

## 2021-02-08 DIAGNOSIS — G47.33 OSA ON CPAP: ICD-10-CM

## 2021-02-08 DIAGNOSIS — E11.9 TYPE 2 DIABETES MELLITUS WITHOUT COMPLICATION, WITHOUT LONG-TERM CURRENT USE OF INSULIN (HCC): Primary | ICD-10-CM

## 2021-02-08 DIAGNOSIS — Z78.0 POST-MENOPAUSE: ICD-10-CM

## 2021-02-08 DIAGNOSIS — J45.41 MODERATE PERSISTENT ASTHMA WITH ACUTE EXACERBATION: ICD-10-CM

## 2021-02-08 DIAGNOSIS — Z99.89 OSA ON CPAP: ICD-10-CM

## 2021-02-08 DIAGNOSIS — I10 ESSENTIAL HYPERTENSION: ICD-10-CM

## 2021-02-08 DIAGNOSIS — G47.00 INSOMNIA, UNSPECIFIED TYPE: ICD-10-CM

## 2021-02-08 PROCEDURE — 4040F PNEUMOC VAC/ADMIN/RCVD: CPT | Performed by: NURSE PRACTITIONER

## 2021-02-08 PROCEDURE — 1090F PRES/ABSN URINE INCON ASSESS: CPT | Performed by: NURSE PRACTITIONER

## 2021-02-08 PROCEDURE — 99214 OFFICE O/P EST MOD 30 MIN: CPT | Performed by: NURSE PRACTITIONER

## 2021-02-08 PROCEDURE — 1123F ACP DISCUSS/DSCN MKR DOCD: CPT | Performed by: NURSE PRACTITIONER

## 2021-02-08 PROCEDURE — G8427 DOCREV CUR MEDS BY ELIG CLIN: HCPCS | Performed by: NURSE PRACTITIONER

## 2021-02-08 PROCEDURE — 3052F HG A1C>EQUAL 8.0%<EQUAL 9.0%: CPT | Performed by: NURSE PRACTITIONER

## 2021-02-08 PROCEDURE — 2022F DILAT RTA XM EVC RTNOPTHY: CPT | Performed by: NURSE PRACTITIONER

## 2021-02-08 PROCEDURE — G8400 PT W/DXA NO RESULTS DOC: HCPCS | Performed by: NURSE PRACTITIONER

## 2021-02-08 PROCEDURE — 3017F COLORECTAL CA SCREEN DOC REV: CPT | Performed by: NURSE PRACTITIONER

## 2021-02-08 RX ORDER — METOPROLOL TARTRATE AND HYDROCHLOROTHIAZIDE 100; 25 MG/1; MG/1
1 TABLET ORAL DAILY
Qty: 90 TABLET | Refills: 2 | Status: SHIPPED | OUTPATIENT
Start: 2021-02-08 | End: 2021-12-02 | Stop reason: SDUPTHER

## 2021-02-08 RX ORDER — IPRATROPIUM BROMIDE 21 UG/1
2 SPRAY, METERED NASAL EVERY 12 HOURS
Qty: 1 BOTTLE | Refills: 3 | Status: SHIPPED | OUTPATIENT
Start: 2021-02-08 | End: 2021-12-02 | Stop reason: ALTCHOICE

## 2021-02-08 RX ORDER — FLUTICASONE PROPIONATE 110 UG/1
2 AEROSOL, METERED RESPIRATORY (INHALATION) 2 TIMES DAILY
Qty: 1 INHALER | Refills: 3 | Status: SHIPPED | OUTPATIENT
Start: 2021-02-08 | End: 2021-04-19 | Stop reason: SDUPTHER

## 2021-02-08 RX ORDER — MONTELUKAST SODIUM 10 MG/1
10 TABLET ORAL NIGHTLY
Qty: 90 TABLET | Refills: 2 | Status: SHIPPED | OUTPATIENT
Start: 2021-02-08 | End: 2021-12-02 | Stop reason: SDUPTHER

## 2021-02-08 RX ORDER — LOSARTAN POTASSIUM 25 MG/1
25 TABLET ORAL DAILY
Qty: 90 TABLET | Refills: 2 | Status: SHIPPED | OUTPATIENT
Start: 2021-02-08 | End: 2021-12-02 | Stop reason: SDUPTHER

## 2021-02-08 RX ORDER — GLIMEPIRIDE 1 MG/1
TABLET ORAL
Qty: 90 TABLET | Refills: 2 | Status: SHIPPED | OUTPATIENT
Start: 2021-02-08 | End: 2021-06-07 | Stop reason: SDUPTHER

## 2021-02-08 SDOH — ECONOMIC STABILITY: FOOD INSECURITY: WITHIN THE PAST 12 MONTHS, YOU WORRIED THAT YOUR FOOD WOULD RUN OUT BEFORE YOU GOT MONEY TO BUY MORE.: NEVER TRUE

## 2021-02-08 SDOH — ECONOMIC STABILITY: TRANSPORTATION INSECURITY
IN THE PAST 12 MONTHS, HAS THE LACK OF TRANSPORTATION KEPT YOU FROM MEDICAL APPOINTMENTS OR FROM GETTING MEDICATIONS?: NO

## 2021-02-08 ASSESSMENT — PATIENT HEALTH QUESTIONNAIRE - PHQ9
SUM OF ALL RESPONSES TO PHQ QUESTIONS 1-9: 2
SUM OF ALL RESPONSES TO PHQ9 QUESTIONS 1 & 2: 2
SUM OF ALL RESPONSES TO PHQ QUESTIONS 1-9: 2

## 2021-02-08 NOTE — PROGRESS NOTES
2/8/2021    TELEHEALTH EVALUATION -- Audio/Visual (During DSNLJ-57 public health emergency)    Due to [place] 19 outbreak, patient's office visit was converted to a virtual visit. Patient was contacted and agreed to proceed with a virtual visit via Doxy. me  The risks and benefits of converting to a virtual visit were discussed in light of the current infectious disease epidemic. Patient also understood that insurance coverage and co-pays are up to their individual insurance plans. Shiloh Cassidy is a 72 y.o. female being evaluated by a Virtual Visit (video visit) encounter to address concerns as mentioned above. A caregiver was present when appropriate. Due to this being a TeleHealth encounter (During IBRCP-30 public health emergency), evaluation of the following organ systems was limited: Vitals/Constitutional/EENT/Resp/CV/GI//MS/Neuro/Skin/Heme-Lymph-Imm. Pursuant to the emergency declaration under the [address] [place] [address] and the "[employer] and [employer] Act, this Virtual Visit was conducted with patient's (and/or legal guardian's) consent, to reduce the patient's risk of exposure to COVID-19 and provide necessary medical care. The patient (and/or legal guardian) has also been advised to contact this office for worsening conditions or problems, and seek emergency medical treatment and/or call 911 if deemed necessary. Patient identification was verified at the start of the visit: Yes    Total time spent for this encounter: Not billed by time    Services were provided through a video synchronous discussion virtually to substitute for in-person clinic visit. Patient and provider were located at their individual homes. The patient is talking with me virtually from her home and I am located at my office in [facility].        HPI: Servando Roberts (:  1955) has requested an audio/video evaluation for the following concern(s):    Diabetes/HTN: sugar levels have been higher in the morning. Admits to not eating the healthiest over the holiday season. Weight has been stable. Had cookies at [address]. Was not able to see her family. Holidays were most stressful this year. She states that she has been trying to eat healthier foods overall. Admits that being down and isolated secondary to the pandemic and current events in the country have been stressful on her. She has been having higher glucose levels in the morning. Has typically just been checking her sugars in the morning. Is currently taking her Metformin at breakfast and dinner. Is taking the Amaryl with breakfast. No low sugar episodes reported. Blood pressure is checked at home. It is an automatic cuff. Had a reading of 158/90 recently. Pulse 68  Pulse oximeter 96%    She is not sure if maybe her cuff is too small. She does require a large blood pressure cuff. She continues to take her blood pressure medication without any side effects. Asthma: peak flow meter was 348. She is on a waiting list at [facility] for covid- vaccine. She got the high dose flu shot in September of last year at [facility]. She continues to take Singulair routinely which she has been taking for years for history of significant allergy induced asthma symptoms. She is also taking Flovent and rescue inhaler as needed. Using Flonase nasal spray as well. She has had a lot of postnasal drip lately no matter what she does. Seems to run like a faucet down into her throat as well. She does not report any severe sinus pressure or pain. Insomnia/sleep apnea: Bharati Lepe has obstructive sleep apnea that is treated with CPAP. The CPAP is used  7 hours 7 nights per week. The CPAP use helps the daytime sleepiness and low energy levels. Needs new mask and tubing. Current equipment is a year old. [employer] is her supplier. She states that she has been waking up often at night and not falling asleep well at night. Thinks that having elevated sugar during the night time. Left foot neurapthy: using salonpas with good results. States that this likely occurred from an injury several years ago. She just wants to make sure that using this topical medication is ok. Review of Systems   This patient reports no chest pain or pressure. There is no shortness of breath or cough. The patient reports no nausea or vomiting. There is no heartburn or indigestion. There is no diarrhea or constipation. No black, bloody, mucusy or tarry stool noticed. The patient reports no bloating and no change in appetite. Prior to Visit Medications    Medication Sig Taking?  Authorizing Provider   montelukast (SINGULAIR) 10 MG tablet Take 1 tablet by mouth nightly Yes PRASHANT Escobedo CNP   losartan (COZAAR) 25 MG tablet Take 1 tablet by mouth daily Yes PRASHANT Escobedo CNP   glimepiride (AMARYL) 1 MG tablet TAKE 1 TABLET BY MOUTH  EVERY MORNING BEFORE  BREAKFAST Yes PRASHANT Nava CNP   metFORMIN (GLUCOPHAGE) 850 MG tablet TAKE 1 TABLET TWICE A DAY WITH MEALS (NEED APPOINTMENT FOR FURTHER REFILLS) Yes PRASHANT Nava CNP   fluticasone (FLOVENT HFA) 110 MCG/ACT inhaler Inhale 2 puffs into the lungs 2 times daily Yes PRASHANT Nava CNP   metoprolol-hydroCHLOROthiazide (LOPRESSOR HCT) 100-25 MG per tablet Take 1 tablet by mouth daily Yes PRASHANT Escobedo CNP   ipratropium (ATROVENT) 0.03 % nasal spray 2 sprays by Each Nostril route every 12 hours Yes PRASHANT Escobedo CNP   Blood Pressure Monitoring (SPHYGMOMANOMETER) MISC 1 Device by Does not apply route daily Yes PRASHANT Gannon CNP VENTOLIN  (90 Base) MCG/ACT inhaler INHALE 2 PUFFS EVERY 4 HOURS AS NEEDED Yes PRASHANT Nava CNP   fluticasone (FLONASE) 50 MCG/ACT nasal spray 2 sprays by Each Nostril route daily Yes PRASHANT Heath CNP   Omega-3 Fatty Acids (FISH OIL) 1200 MG CAPS Take by mouth Yes Historical Provider, MD   Multiple Vitamins-Minerals (MULTIVITAMIN & MINERAL PO) Take by mouth Yes Historical Provider, MD   Calcium Carb-Cholecalciferol (CALCIUM CARBONATE-VITAMIN D3 PO) Take by mouth Yes Historical Provider, MD   aspirin 81 MG tablet Take 81 mg by mouth Yes Historical Provider, MD       Social History     Tobacco Use    Smoking status: Never Smoker    Smokeless tobacco: Never Used   Substance Use Topics    Alcohol use: Yes     Comment: occassionally    Drug use: No        PHYSICAL EXAMINATION:  [ INSTRUCTIONS:  \"[x]\" Indicates a positive item  \"[]\" Indicates a negative item  -- DELETE ALL ITEMS NOT EXAMINED]  [x] Alert  [x] Oriented to person/place/time    [x] No apparent distress  [] Toxic appearing    [] Face flushed appearing [] Sclera clear  [] Lips are cyanotic      [x] Breathing appears normal  [] Appears tachypneic      [] Rash on visible skin    [x] Cranial Nerves II-XII grossly intact    [x] Motor grossly intact in visible upper extremities    [] Motor grossly intact in visible lower extremities    [x] Normal Mood  [] Anxious appearing    [] Depressed appearing  [] Confused appearing      [] Poor short term memory  [] Poor long term memory    [] OTHER:      Due to this being a TeleHealth encounter, evaluation of the following organ systems is limited: Vitals/Constitutional/EENT/Resp/CV/GI//MS/Neuro/Skin/Heme-Lymph-Imm. ASSESSMENT/PLAN:   Diagnosis Orders   1.  Type 2 diabetes mellitus without complication, without long-term current use of insulin (HCC)  glimepiride (AMARYL) 1 MG tablet    metFORMIN (GLUCOPHAGE) 850 MG tablet    CBC Auto Differential    Comprehensive Metabolic Panel Lipid Panel    Hemoglobin A1C    Microalbumin / Creatinine Urine Ratio   2. Essential hypertension  losartan (COZAAR) 25 MG tablet    metoprolol-hydroCHLOROthiazide (LOPRESSOR HCT) 100-25 MG per tablet    Blood Pressure Monitoring (SPHYGMOMANOMETER) MISC   3. Moderate persistent asthma with acute exacerbation  fluticasone (FLOVENT HFA) 110 MCG/ACT inhaler   4. LYNNE on CPAP     5. Insomnia, unspecified type     6. Post-menopause  DEXA BONE DENSITY AXIAL SKELETON   7. Seasonal allergic rhinitis due to other allergic trigger     8. Oropharyngeal dysphagia     9. Post-nasal drip  ipratropium (ATROVENT) 0.03 % nasal spray   10. Neuropathy- left foot- previous injury         Return in about 3 months (around 5/8/2021) for Diabetes- in office- level . 1. 2. Recommend taking metformin with breakfast and then prior to bed. Take the Amaryl at dinner time now. Take glucose in the evening as well as the morning and keep a log. Prescription sent for new blood pressure machine with large cuff. She is also given the option of having blood pressure check at the office with MA visit. She is trying to remain at home most of the time until she is able to get Covid vaccine. She has had multiple friends and family affected by Covid. 3. 7. 8. 9.  Breathing has been at baseline on current medication. Allergic rhinitis symptoms have been worse even while using antihistamine, Singulair and Flonase. Discussed option of trying Atrovent to help with postnasal drip. She feels that throat tightness sensation could be secondary to all the drainage coming from her nose. She has not had any worsening of symptoms since her last visit. 4. 5.  We will update prescription for CPAP mask and tubing. She has been compliant with CPAP. Waking up during the night likely secondary to elevated sugar. Did not report efficacy of Rozerem.

## 2021-04-07 ENCOUNTER — HOSPITAL ENCOUNTER (OUTPATIENT)
Dept: WOMENS IMAGING | Age: 66
Discharge: HOME | End: 2021-04-09
Payer: MEDICARE

## 2021-04-07 DIAGNOSIS — Z78.0 POST-MENOPAUSE: ICD-10-CM

## 2021-04-07 PROCEDURE — 77080 DXA BONE DENSITY AXIAL: CPT

## 2021-04-19 DIAGNOSIS — J45.41 MODERATE PERSISTENT ASTHMA WITH ACUTE EXACERBATION: ICD-10-CM

## 2021-04-19 RX ORDER — FLUTICASONE PROPIONATE 110 UG/1
2 AEROSOL, METERED RESPIRATORY (INHALATION) 2 TIMES DAILY
Qty: 1 INHALER | Refills: 3 | Status: SHIPPED | OUTPATIENT
Start: 2021-04-19 | End: 2021-12-02 | Stop reason: SDUPTHER

## 2021-05-16 ENCOUNTER — HOSPITAL ENCOUNTER (EMERGENCY)
Age: 66
Discharge: HOME | End: 2021-05-16
Attending: EMERGENCY MEDICINE
Payer: MEDICARE

## 2021-05-16 VITALS
WEIGHT: 225 LBS | DIASTOLIC BLOOD PRESSURE: 94 MMHG | OXYGEN SATURATION: 97 % | RESPIRATION RATE: 16 BRPM | HEART RATE: 75 BPM | TEMPERATURE: 97.5 F | SYSTOLIC BLOOD PRESSURE: 195 MMHG | BODY MASS INDEX: 44.17 KG/M2 | HEIGHT: 60 IN

## 2021-05-16 DIAGNOSIS — S05.01XA ABRASION OF RIGHT CORNEA, INITIAL ENCOUNTER: Primary | ICD-10-CM

## 2021-05-16 PROCEDURE — 99283 EMERGENCY DEPT VISIT LOW MDM: CPT

## 2021-05-16 RX ORDER — TRAMADOL HYDROCHLORIDE 50 MG/1
50 TABLET ORAL EVERY 4 HOURS PRN
Qty: 18 TABLET | Refills: 0 | Status: SHIPPED | OUTPATIENT
Start: 2021-05-16 | End: 2021-05-19

## 2021-05-16 ASSESSMENT — VISUAL ACUITY: OU: 20/20

## 2021-05-16 ASSESSMENT — ENCOUNTER SYMPTOMS
SHORTNESS OF BREATH: 0
COUGH: 0
DIARRHEA: 0
EYE PAIN: 1
BACK PAIN: 0
ABDOMINAL PAIN: 0
EYE DISCHARGE: 1
NAUSEA: 0
VOMITING: 0
SORE THROAT: 0
EYE REDNESS: 1

## 2021-05-16 ASSESSMENT — PAIN DESCRIPTION - FREQUENCY: FREQUENCY: CONTINUOUS

## 2021-05-16 ASSESSMENT — PAIN DESCRIPTION - LOCATION: LOCATION: EYE

## 2021-05-16 ASSESSMENT — PAIN DESCRIPTION - ONSET: ONSET: ON-GOING

## 2021-05-16 NOTE — ED TRIAGE NOTES
Patient to room #4 for c/o right eye pain after being hit with a pine branch while mowing the lawn yesterday.

## 2021-05-16 NOTE — ED NOTES
Discharge instructions given. Patient verbalizes understanding and denies any questions. Scripts x 2 given. Patient ambulating with steady gait upon discharge.         Abram Griffin RN  05/16/21 [id number]

## 2021-05-16 NOTE — ED PROVIDER NOTES
[facility] ED  eMERGENCYdEPARTMENT eNCOUnter      Pt Name: Jefry Martinez  MRN: [id number]  [place] 1955  Date of evaluation: 5/16/2021  Carles Dandy, MD    CHIEF COMPLAINT           HPI  Jefry Martinez is a 77 y.o. female per chart review has a h/o CAD, asthma, HTN, LYNNE, DM II presents to the ED with R eye pain. Pt notes she was mowing the lawn last night and then she thinks a pine cone hit her in the eye. Pt notes moderate, constant, burning, R eye pain. +Watering and redness. Pt denies LOC, cp, sob, ab pain, dysuria, diarrhea. Pt denies blurry vision. ROS  Review of Systems   Constitutional: Negative for activity change, chills and fever. HENT: Negative for ear pain and sore throat. Eyes: Positive for pain, discharge and redness. Negative for visual disturbance. Respiratory: Negative for cough and shortness of breath. Cardiovascular: Negative for chest pain, palpitations and leg swelling. Gastrointestinal: Negative for abdominal pain, diarrhea, nausea and vomiting. Genitourinary: Negative for dysuria. Musculoskeletal: Negative for back pain. Skin: Negative for rash. Neurological: Negative for dizziness and weakness. Except as noted above the remainder of the review of systems was reviewed and negative.        PAST MEDICAL HISTORY     Past Medical History:   Diagnosis Date    Asthma     CAD (coronary artery disease)     Hypertension     Kidney stone     Sleep apnea     wears cpap    Type 2 diabetes mellitus without complication ([facility] [address]          SURGICAL HISTORY       Past Surgical History:   Procedure Laterality Date    CARPAL TUNNEL RELEASE Bilateral 80's    carpal tunnel both wrists and right foot, POLLACK'S NEUROMA    EYE SURGERY Right 03/2017    had film removed on right eye    JOINT REPLACEMENT Left 09/22/2010    left thumb replacement    KNEE SURGERY Bilateral     torn meniscus 1/16/2009, 2/12/2009, 2/2016    KNEE SURGERY Left 02/2016    OVARIAN CYST REMOVAL  05/13/2008    ROTATOR CUFF REPAIR Left 01/08/2004    ROTATOR CUFF REPAIR Right 08/22/2005    TONSILLECTOMY AND ADENOIDECTOMY  1963         CURRENTMEDICATIONS       Previous Medications    ASPIRIN 81 MG TABLET    Take 81 mg by mouth    BLOOD PRESSURE MONITORING (SPHYGMOMANOMETER) MISC    1 Device by Does not apply route daily    CALCIUM CARB-CHOLECALCIFEROL (CALCIUM CARBONATE-VITAMIN D3 PO)    Take by mouth    FLUTICASONE (FLONASE) 50 MCG/ACT NASAL SPRAY    2 sprays by Each Nostril route daily    FLUTICASONE (FLOVENT HFA) 110 MCG/ACT INHALER    Inhale 2 puffs into the lungs 2 times daily    GLIMEPIRIDE (AMARYL) 1 MG TABLET    TAKE 1 TABLET BY MOUTH  EVERY MORNING BEFORE  BREAKFAST    IPRATROPIUM (ATROVENT) 0.03 % NASAL SPRAY    2 sprays by Each Nostril route every 12 hours    LOSARTAN (COZAAR) 25 MG TABLET    Take 1 tablet by mouth daily    METFORMIN (GLUCOPHAGE) 850 MG TABLET    TAKE 1 TABLET TWICE A DAY WITH MEALS (NEED APPOINTMENT FOR FURTHER REFILLS)    METOPROLOL-HYDROCHLOROTHIAZIDE (LOPRESSOR HCT) 100-25 MG PER TABLET    Take 1 tablet by mouth daily    MONTELUKAST (SINGULAIR) 10 MG TABLET    Take 1 tablet by mouth nightly    MULTIPLE VITAMINS-MINERALS (MULTIVITAMIN & MINERAL PO)    Take by mouth    OMEGA-3 FATTY ACIDS (FISH OIL) 1200 MG CAPS    Take by mouth    VENTOLIN  (90 BASE) MCG/ACT INHALER    INHALE 2 PUFFS EVERY 4 HOURS AS NEEDED       ALLERGIES     Lisinopril, Molds & smuts, Salsalate, Statins, and Percocet [oxycodone-acetaminophen]    FAMILY HISTORY       Family History   Problem Relation Age of Onset    Lung Cancer Mother     Diabetes Mother     Hypertension Mother     Diabetes Father     Hypertension Father     Hypertension Brother     Diabetes Brother     Breast Cancer Maternal Aunt           SOCIAL HISTORY       Social History     Socioeconomic History    Marital status: Single     Spouse name: None    Number of children: None    Years of education: None

## 2021-06-04 ENCOUNTER — HOSPITAL ENCOUNTER (OUTPATIENT)
Dept: LAB | Age: 66
Discharge: HOME | End: 2021-06-04
Payer: MEDICARE

## 2021-06-04 DIAGNOSIS — E11.9 TYPE 2 DIABETES MELLITUS WITHOUT COMPLICATION, WITHOUT LONG-TERM CURRENT USE OF INSULIN (HCC): ICD-10-CM

## 2021-06-04 LAB
ALBUMIN SERPL-MCNC: 4.1 G/DL (ref 3.5–4.6)
ALP BLD-CCNC: 80 U/L (ref 40–130)
ALT SERPL-CCNC: 22 U/L (ref 0–33)
ANION GAP SERPL CALCULATED.3IONS-SCNC: 11 MEQ/L (ref 9–15)
AST SERPL-CCNC: 24 U/L (ref 0–35)
BASOPHILS ABSOLUTE: 0 K/UL (ref 0–0.2)
BASOPHILS RELATIVE PERCENT: 0.4 %
BILIRUB SERPL-MCNC: 0.4 MG/DL (ref 0.2–0.7)
BUN BLDV-MCNC: 15 MG/DL (ref 8–23)
CALCIUM SERPL-MCNC: 9.2 MG/DL (ref 8.5–9.9)
CHLORIDE BLD-SCNC: 101 MEQ/L (ref 95–107)
CHOLESTEROL, TOTAL: 137 MG/DL (ref 0–199)
CO2: 28 MEQ/L (ref 20–31)
CREAT SERPL-MCNC: 0.71 MG/DL (ref 0.5–0.9)
CREATININE URINE: 114.3 MG/DL
EOSINOPHILS ABSOLUTE: 0.2 K/UL (ref 0–0.7)
EOSINOPHILS RELATIVE PERCENT: 2.5 %
GFR AFRICAN AMERICAN: >60
GFR NON-AFRICAN AMERICAN: >60
GLOBULIN: 2.7 G/DL (ref 2.3–3.5)
GLUCOSE BLD-MCNC: 183 MG/DL (ref 70–99)
HBA1C MFR BLD: 8.3 % (ref 4.8–5.9)
HCT VFR BLD CALC: 41.7 % (ref 37–47)
HDLC SERPL-MCNC: 40 MG/DL (ref 40–59)
HEMOGLOBIN: 14.3 G/DL (ref 12–16)
LDL CHOLESTEROL CALCULATED: 74 MG/DL (ref 0–129)
LYMPHOCYTES ABSOLUTE: 2.1 K/UL (ref 1–4.8)
LYMPHOCYTES RELATIVE PERCENT: 31.5 %
MCH RBC QN AUTO: 30.1 PG (ref 27–31.3)
MCHC RBC AUTO-ENTMCNC: 34.3 % (ref 33–37)
MCV RBC AUTO: 87.5 FL (ref 82–100)
MICROALBUMIN UR-MCNC: <1.2 MG/DL
MICROALBUMIN/CREAT UR-RTO: NORMAL MG/G (ref 0–30)
MONOCYTES ABSOLUTE: 0.5 K/UL (ref 0.2–0.8)
MONOCYTES RELATIVE PERCENT: 8.1 %
NEUTROPHILS ABSOLUTE: 3.8 K/UL (ref 1.4–6.5)
NEUTROPHILS RELATIVE PERCENT: 57.5 %
PDW BLD-RTO: 15.1 % (ref 11.5–14.5)
PLATELET # BLD: 267 K/UL (ref 130–400)
POTASSIUM SERPL-SCNC: 3.6 MEQ/L (ref 3.4–4.9)
RBC # BLD: 4.77 M/UL (ref 4.2–5.4)
SODIUM BLD-SCNC: 140 MEQ/L (ref 135–144)
TOTAL PROTEIN: 6.8 G/DL (ref 6.3–8)
TRIGL SERPL-MCNC: 115 MG/DL (ref 0–150)
WBC # BLD: 6.6 K/UL (ref 4.8–10.8)

## 2021-06-04 PROCEDURE — 82043 UR ALBUMIN QUANTITATIVE: CPT

## 2021-06-04 PROCEDURE — 80053 COMPREHEN METABOLIC PANEL: CPT

## 2021-06-04 PROCEDURE — 36415 COLL VENOUS BLD VENIPUNCTURE: CPT

## 2021-06-04 PROCEDURE — 80061 LIPID PANEL: CPT

## 2021-06-04 PROCEDURE — 83036 HEMOGLOBIN GLYCOSYLATED A1C: CPT

## 2021-06-04 PROCEDURE — 82570 ASSAY OF URINE CREATININE: CPT

## 2021-06-04 PROCEDURE — 85025 COMPLETE CBC W/AUTO DIFF WBC: CPT

## 2021-06-07 ENCOUNTER — OFFICE VISIT (OUTPATIENT)
Dept: FAMILY MEDICINE CLINIC | Age: 66
End: 2021-06-07
Payer: MEDICARE

## 2021-06-07 VITALS
SYSTOLIC BLOOD PRESSURE: 136 MMHG | BODY MASS INDEX: 43.9 KG/M2 | HEART RATE: 72 BPM | TEMPERATURE: 97.3 F | DIASTOLIC BLOOD PRESSURE: 78 MMHG | WEIGHT: 223.6 LBS | OXYGEN SATURATION: 98 % | HEIGHT: 60 IN

## 2021-06-07 DIAGNOSIS — E66.01 MORBIDLY OBESE (HCC): ICD-10-CM

## 2021-06-07 DIAGNOSIS — R07.89 CHEST DISCOMFORT: ICD-10-CM

## 2021-06-07 DIAGNOSIS — Z99.89 OSA ON CPAP: ICD-10-CM

## 2021-06-07 DIAGNOSIS — G57.63 MORTON'S NEUROMA OF BOTH FEET: ICD-10-CM

## 2021-06-07 DIAGNOSIS — I10 ESSENTIAL HYPERTENSION: ICD-10-CM

## 2021-06-07 DIAGNOSIS — G62.9 NEUROPATHY: ICD-10-CM

## 2021-06-07 DIAGNOSIS — E11.9 TYPE 2 DIABETES MELLITUS WITHOUT COMPLICATION, WITHOUT LONG-TERM CURRENT USE OF INSULIN (HCC): Primary | ICD-10-CM

## 2021-06-07 DIAGNOSIS — Z12.83 SKIN CANCER SCREENING: ICD-10-CM

## 2021-06-07 DIAGNOSIS — S05.01XD ABRASION OF RIGHT CORNEA, SUBSEQUENT ENCOUNTER: ICD-10-CM

## 2021-06-07 DIAGNOSIS — G47.00 INSOMNIA, UNSPECIFIED TYPE: ICD-10-CM

## 2021-06-07 DIAGNOSIS — L98.9 SKIN LESION OF LEFT LEG: ICD-10-CM

## 2021-06-07 DIAGNOSIS — G47.33 OSA ON CPAP: ICD-10-CM

## 2021-06-07 DIAGNOSIS — J45.30 MILD PERSISTENT ASTHMA WITHOUT COMPLICATION: ICD-10-CM

## 2021-06-07 PROCEDURE — 1090F PRES/ABSN URINE INCON ASSESS: CPT | Performed by: NURSE PRACTITIONER

## 2021-06-07 PROCEDURE — 2022F DILAT RTA XM EVC RTNOPTHY: CPT | Performed by: NURSE PRACTITIONER

## 2021-06-07 PROCEDURE — G8427 DOCREV CUR MEDS BY ELIG CLIN: HCPCS | Performed by: NURSE PRACTITIONER

## 2021-06-07 PROCEDURE — 3052F HG A1C>EQUAL 8.0%<EQUAL 9.0%: CPT | Performed by: NURSE PRACTITIONER

## 2021-06-07 PROCEDURE — 3017F COLORECTAL CA SCREEN DOC REV: CPT | Performed by: NURSE PRACTITIONER

## 2021-06-07 PROCEDURE — 1123F ACP DISCUSS/DSCN MKR DOCD: CPT | Performed by: NURSE PRACTITIONER

## 2021-06-07 PROCEDURE — G8417 CALC BMI ABV UP PARAM F/U: HCPCS | Performed by: NURSE PRACTITIONER

## 2021-06-07 PROCEDURE — 1036F TOBACCO NON-USER: CPT | Performed by: NURSE PRACTITIONER

## 2021-06-07 PROCEDURE — G8399 PT W/DXA RESULTS DOCUMENT: HCPCS | Performed by: NURSE PRACTITIONER

## 2021-06-07 PROCEDURE — 4040F PNEUMOC VAC/ADMIN/RCVD: CPT | Performed by: NURSE PRACTITIONER

## 2021-06-07 PROCEDURE — 99214 OFFICE O/P EST MOD 30 MIN: CPT | Performed by: NURSE PRACTITIONER

## 2021-06-07 RX ORDER — GLIMEPIRIDE 1 MG/1
TABLET ORAL
Qty: 180 TABLET | Refills: 2 | Status: SHIPPED | OUTPATIENT
Start: 2021-06-07 | End: 2021-12-02 | Stop reason: SDUPTHER

## 2021-06-07 NOTE — PROGRESS NOTES
Subjective:     Diabetes Mellitus Type 2: Current symptoms/problems include none. Home blood sugar records:  trend: stable  Any episodes of hypoglycemia? no  Tobacco history: She  reports that she has never smoked. She has never used smokeless tobacco.   Known diabetic complications: none   She has lost 11 lbs since our last visit. She uses salonpas to help with foot issues does have a history of mortons neuroma. Has chronic neuropathy pain from previous injury. Hypertension:  Home blood pressure monitoring: Yes - stable. She is adherent to a low sodium diet. Antihypertensive medication side effects: no medication side effects noted. Use of agents associated with hypertension: none. Asthma: she is not currently using the Flovent inhaler. Does not like to take medication on routine basis. Has been having some chest wall discomfort that she feels is related to asthma. Both sides of the chest. Does not happen often. Still has some issues with post nasal drip but not nearly as much as their was. Still has occasional tickle that leads to wheezing and bronchospasm. LYNNE: insomnia: Misha Tierney has obstructive sleep apnea that is treated with CPAP. The CPAP is used  7 hours 7 nights per week. The CPAP use helps the daytime sleepiness and low energy levels. Skin lesion of the left leg: formed several months ago. Has gotten darker in color in recent weeks. She has a history of pre-cancer lesions removed in the past.      The five diabetic measures for control:   Hemoglobin A1C (%)   Date Value   06/04/2021 8.3 (H)     LDL Calculated (mg/dL)   Date Value   06/04/2021 74         Blood pressure less than 131/81,   BP Readings from Last 1 Encounters:   06/07/21 136/78     Smoking, non smoker is goal. This pt is not a smoker. This pt does an aspirin a day.      Last eye exam was 2020  Last diabetic foot exam was 2020  Last urine microalbumin creatinine ratio was 2021    PMH: This 77 y.o. female  patient is hypertensive, is  diabetic, is not hyperlipidemic. She has no history of smoking and has a  family history of heart disease. She is  obese. Review of Systems  Patient denies chest pain/cardiac, headache, lightheadedness and palpitations. Eyes: no rapid change in vision  Ears, nose, mouth, throat, and face: no changes in hearing or sore throat  Respiratory: No shortness of breath or cough. Cardiovascular: no chest pain or pressure. This patient reports no polyuria, polydipsia or episodes of hypoglycemia. Treatment Adherence:   Medication compliance:  compliant most of the time  Diet compliance:  compliant most of the time  Weight trend: stable  Current exercise: no regular exercise  What might prevent you from meeting your goal?: none  Patient plan for overcoming barriers: N/A     Patient Confidence: 8/10      Objective:   EXAM:  Constitutional Blood pressure 136/78, pulse 72, temperature 97.3 °F (36.3 °C), temperature source Temporal, height 5' (1.524 m), weight 223 lb 9.6 oz (101.4 kg), SpO2 98 %, not currently breastfeeding. .  She has a normal affect, no acute distress, appears well developed and well nourished. Neck:  neck- supple, no mass, non-tender and no bruits  Lungs:  Normal expansion. Clear to auscultation. No rales, rhonchi, or wheezing., No chest wall tenderness. Heart:  Heart sounds are normal.  Regular rate and rhythm without murmur, gallop or rub. Abdomen:  Soft, non-tender, normal bowel sounds. No bruits, organomegaly or masses. Extremities: Extremities warm to touch, pink, with no edema. Skin: left anterior lower leg, 0.7 cm x 0.8 cm slightly raised, irregular borders, pale pink       Assessment:      Diagnosis Orders   1.  Type 2 diabetes mellitus without complication, without long-term current use of insulin (HCC)  glimepiride (AMARYL) 1 MG tablet    CBC Auto Differential    Comprehensive Metabolic Panel    Lipid Panel    Hemoglobin A1C    Microalbumin / Creatinine Urine Ratio 2. Essential hypertension     3. Morbidly obese ([address]     4. LYNNE on CPAP     5. Insomnia, unspecified type     6. Mild persistent asthma without complication     7. Neuropathy- left foot- previous injury     8. Maier's neuroma of both feet     9. Skin lesion of left leg  Harsha Estes MD, Dermatology, [place]   10. Chest discomfort     11. Abrasion of right cornea, subsequent encounter     12. Skin cancer screening  Harsha Estes MD, Dermatology, [place]       PLAN: Include orders in the DX section. Diabetes Counseling   Patient was counseled regarding disease risks and adopting healthy behaviors. Patient was provided education materials to assist with self management. Patient was provided log (or received log during previous visit) to record blood pressure, food intake and/or blood sugar. Patient was instructed to keep log up-to-date and to always bring log to all office visits. Follow up: 4 months and as needed. Blood work one week prior as ordered. 1. 3. Hemoglobin A1c is not at goal but slightly improved. Will add on evening dose of amaryl and recheck levels in 4 months. Increase physical activity with possible. 2. Blood pressure is well controlled on current medication. Continue the same. 4. 5. She has been compliant with CPAP therapy. Sleeping well at night overall. 6. 10.  Has had some chest pain that is described to be in the chest wall and likely secondary to asthma. If symptoms become more frequent, recommend taking medication on routine basis again. Can get pulmonology consult. She is also encouraged to follow up with her established cardiologist.   (she has followed with Dr. Albaro Squires in the past). 7. 8. Discussed option of referral to podiatry but she declines foot exam at this time. No recent changes in chronic symptoms. 9. 12. Recommend referral to dermatology for changing skin lesion to the lower extremity. Referral printed.     11. Symptoms have resolved since ER evaluation. Reviewed recent ER visit note. Please note this report has been partially produced using speech recognition software and may cause contain errors related to that system including grammar, punctuation and spelling as well as words and phrases that may seem inappropriate. If there are questions or concerns please feel free to contact me to clarify.         Electronically signed by PRASHANT Almaguer CNP-CNP, 2:06 PM 6/7/21

## 2021-06-29 ENCOUNTER — OFFICE VISIT (OUTPATIENT)
Dept: FAMILY MEDICINE CLINIC | Age: 66
End: 2021-06-29
Payer: MEDICARE

## 2021-06-29 VITALS — HEIGHT: 60 IN | TEMPERATURE: 97.7 F | WEIGHT: 223 LBS | BODY MASS INDEX: 43.78 KG/M2

## 2021-06-29 DIAGNOSIS — L57.0 ACTINIC KERATOSES: Primary | ICD-10-CM

## 2021-06-29 PROCEDURE — 3017F COLORECTAL CA SCREEN DOC REV: CPT | Performed by: FAMILY MEDICINE

## 2021-06-29 PROCEDURE — G8427 DOCREV CUR MEDS BY ELIG CLIN: HCPCS | Performed by: FAMILY MEDICINE

## 2021-06-29 PROCEDURE — 4040F PNEUMOC VAC/ADMIN/RCVD: CPT | Performed by: FAMILY MEDICINE

## 2021-06-29 PROCEDURE — 1090F PRES/ABSN URINE INCON ASSESS: CPT | Performed by: FAMILY MEDICINE

## 2021-06-29 PROCEDURE — G8399 PT W/DXA RESULTS DOCUMENT: HCPCS | Performed by: FAMILY MEDICINE

## 2021-06-29 PROCEDURE — G8417 CALC BMI ABV UP PARAM F/U: HCPCS | Performed by: FAMILY MEDICINE

## 2021-06-29 PROCEDURE — 99213 OFFICE O/P EST LOW 20 MIN: CPT | Performed by: FAMILY MEDICINE

## 2021-06-29 PROCEDURE — 1036F TOBACCO NON-USER: CPT | Performed by: FAMILY MEDICINE

## 2021-06-29 PROCEDURE — 1123F ACP DISCUSS/DSCN MKR DOCD: CPT | Performed by: FAMILY MEDICINE

## 2021-06-29 NOTE — PROGRESS NOTES
Diagnosis Orders   1. Actinic keratoses       Return in about 1 year (around 6/29/2022) for 15 min skin check. Patient Instructions     Discussed with patient particular actinic keratosis was likely to fall off on its own due to significantly superficial layer. If this does not occur she can return sooner for treatment. Otherwise yearly exams. Patient Education        Actinic Keratosis: Care Instructions  Your Care Instructions  Actinic keratosis is a skin growth caused by sun damage. It can turn into skin cancer, but this isn't common. Actinic keratoses, also called solar keratoses, are small red, brown, or skin-colored scaly patches. They are most common on the face, neck, hands, and forearms. Your doctor can remove these growths by freezing or scraping them off or by putting medicines on them. Follow-up care is a key part of your treatment and safety. Be sure to make and go to all appointments, and call your doctor if you are having problems. It's also a good idea to know your test results and keep a list of the medicines you take. How can you care for yourself at home? · If your doctor told you how to care for the treated area, follow your doctor's instructions. If you did not get instructions, follow this general advice:  ? Wash around the area with clean water 2 times a day. Don't use hydrogen peroxide or alcohol, which can slow healing. ? You may cover the area with a thin layer of petroleum jelly, such as Vaseline, and a nonstick bandage. ? Apply more petroleum jelly and replace the bandage as needed. To prevent actinic keratosis  · Always wear sunscreen on exposed skin. Make sure to use a broad-spectrum sunscreen that has a sun protection factor (SPF) of 30 or higher. Use it every day, even when it is cloudy. · Wear long sleeves, a hat, and pants if you are going to be outdoors for a long time. · Avoid the sun between 10 a.m. and 4 p.m., the peak time for UV rays.   · Do not use tanning booths or sunlamps. When should you call for help? Watch closely for changes in your health, and be sure to contact your doctor if:    · You have symptoms of infection, such as:  ? Increased pain, swelling, warmth, or redness. ? Red streaks leading from the area. ? Pus draining from the area. ? A fever. Where can you learn more? Go to https://AppThwackpepiceweb.AiMeiWei. org and sign in to your [employer] account. Enter L364 in the [facility] box to learn more about \"Actinic Keratosis: Care Instructions. \"     If you do not have an account, please click on the \"Sign Up Now\" link. Current as of: March 3, 2021               Content Version: 12.9  © 2006-2021 Healthwise, [employer]. Care instructions adapted under license by [facility] ([facility]). If you have questions about a medical condition or this instruction, always ask your healthcare professional. [place] [address] any warranty or liability for your use of this information. Subjective:      Patient ID: Mary Romero is a 77 y.o. female who presents for:  Chief Complaint   Patient presents with    Skin Exam    Skin Problem     area of concern - on left leg        Patient here for routine skin check. No obvious new lesions but history of sun exposure in a fair patient.       Current Outpatient Medications on File Prior to Visit   Medication Sig Dispense Refill    glimepiride (AMARYL) 1 MG tablet TAKE 1 TABLET BY MOUTH  EVERY MORNING BEFORE  BREAKFAST AND EVERY EVENING BEFORE DINNER. 180 tablet 2    fluticasone (FLOVENT HFA) 110 MCG/ACT inhaler Inhale 2 puffs into the lungs 2 times daily 1 Inhaler 3    montelukast (SINGULAIR) 10 MG tablet Take 1 tablet by mouth nightly 90 tablet 2    losartan (COZAAR) 25 MG tablet Take 1 tablet by mouth daily 90 tablet 2    metFORMIN (GLUCOPHAGE) 850 MG tablet TAKE 1 TABLET TWICE A DAY WITH MEALS (NEED APPOINTMENT FOR FURTHER REFILLS) 180 tablet 3    metoprolol-hydroCHLOROthiazide (LOPRESSOR HCT) 100-25 MG per tablet Take 1 tablet by mouth daily 90 tablet 2    ipratropium (ATROVENT) 0.03 % nasal spray 2 sprays by Each Nostril route every 12 hours 1 Bottle 3    Blood Pressure Monitoring (SPHYGMOMANOMETER) MISC 1 Device by Does not apply route daily 1 each 0    VENTOLIN  (90 Base) MCG/ACT inhaler INHALE 2 PUFFS EVERY 4 HOURS AS NEEDED 3 Inhaler 1    fluticasone (FLONASE) 50 MCG/ACT nasal spray 2 sprays by Each Nostril route daily 3 Bottle 1    Omega-3 Fatty Acids (FISH OIL) 1200 MG CAPS Take by mouth      Multiple Vitamins-Minerals (MULTIVITAMIN & MINERAL PO) Take by mouth      Calcium Carb-Cholecalciferol (CALCIUM CARBONATE-VITAMIN D3 PO) Take by mouth      aspirin 81 MG tablet Take 81 mg by mouth       No current facility-administered medications on file prior to visit.      Past Medical History:   Diagnosis Date    Asthma     CAD (coronary artery disease)     Hypertension     Kidney stone     Sleep apnea     wears cpap    Type 2 diabetes mellitus without complication ([facility] [address]      Past Surgical History:   Procedure Laterality Date    CARPAL TUNNEL RELEASE Bilateral 80's    carpal tunnel both wrists and right foot, POLLACK'S NEUROMA    EYE SURGERY Right 03/2017    had film removed on right eye    JOINT REPLACEMENT Left 09/22/2010    left thumb replacement    KNEE SURGERY Bilateral     torn meniscus 1/16/2009, 2/12/2009, 2/2016    KNEE SURGERY Left 02/2016    OVARIAN CYST REMOVAL  05/13/2008    ROTATOR CUFF REPAIR Left 01/08/2004    ROTATOR CUFF REPAIR Right 08/22/2005    TONSILLECTOMY AND ADENOIDECTOMY  1963     Social History     Socioeconomic History    Marital status: Single     Spouse name: Not on file    Number of children: Not on file    Years of education: Not on file    Highest education level: Not on file   Occupational History    Not on file   Tobacco Use    Smoking status: Never Smoker    Smokeless tobacco: Never Used   Vaping Use    Vaping Use: Temp 97.7 °F (36.5 °C)   Ht 5' (1.524 m)   Wt 223 lb (101.2 kg)   BMI 43.55 kg/m²     Physical Exam  Constitutional:       General: She is not in acute distress. Appearance: Normal appearance. She is well-developed. She is not toxic-appearing. HENT:      Head: Normocephalic and atraumatic. Right Ear: Hearing and tympanic membrane normal.      Left Ear: Hearing and tympanic membrane normal.      Nose: Nose normal. No nasal deformity. Eyes:      General: Lids are normal.         Right eye: No discharge. Left eye: No discharge. Conjunctiva/sclera: Conjunctivae normal.      Pupils: Pupils are equal, round, and reactive to light. Neck:      Thyroid: No thyroid mass or thyromegaly. Vascular: No JVD. Trachea: Trachea and phonation normal.   Cardiovascular:      Rate and Rhythm: Normal rate and regular rhythm. Pulmonary:      Effort: No accessory muscle usage or respiratory distress. Musculoskeletal:      Cervical back: Full passive range of motion without pain. Comments: FROM all large muscle groups and joints as witnessed when walking to exam table, getting on, and getting off the exam table. Skin:     General: Skin is warm and dry. Findings: No rash. Comments: Left upper extremity 2 mm very superficial rough white flake lesion. Neurological:      Mental Status: She is alert. Motor: No tremor or atrophy. Gait: Gait normal.   Psychiatric:         Speech: Speech normal.         Behavior: Behavior normal.         Thought Content: Thought content normal.         No results found for this visit on 06/29/21. Assessment:       Diagnosis Orders   1. Actinic keratoses           No orders of the defined types were placed in this encounter. Plan:   Return in about 1 year (around 6/29/2022) for 15 min skin check.     Patient Instructions     Discussed with patient particular actinic keratosis was likely to fall off on its own due to significantly superficial layer. If this does not occur she can return sooner for treatment. Otherwise yearly exams. Patient Education        Actinic Keratosis: Care Instructions  Your Care Instructions  Actinic keratosis is a skin growth caused by sun damage. It can turn into skin cancer, but this isn't common. Actinic keratoses, also called solar keratoses, are small red, brown, or skin-colored scaly patches. They are most common on the face, neck, hands, and forearms. Your doctor can remove these growths by freezing or scraping them off or by putting medicines on them. Follow-up care is a key part of your treatment and safety. Be sure to make and go to all appointments, and call your doctor if you are having problems. It's also a good idea to know your test results and keep a list of the medicines you take. How can you care for yourself at home? · If your doctor told you how to care for the treated area, follow your doctor's instructions. If you did not get instructions, follow this general advice:  ? Wash around the area with clean water 2 times a day. Don't use hydrogen peroxide or alcohol, which can slow healing. ? You may cover the area with a thin layer of petroleum jelly, such as Vaseline, and a nonstick bandage. ? Apply more petroleum jelly and replace the bandage as needed. To prevent actinic keratosis  · Always wear sunscreen on exposed skin. Make sure to use a broad-spectrum sunscreen that has a sun protection factor (SPF) of 30 or higher. Use it every day, even when it is cloudy. · Wear long sleeves, a hat, and pants if you are going to be outdoors for a long time. · Avoid the sun between 10 a.m. and 4 p.m., the peak time for UV rays. · Do not use tanning booths or sunlamps. When should you call for help? Watch closely for changes in your health, and be sure to contact your doctor if:    · You have symptoms of infection, such as:  ? Increased pain, swelling, warmth, or redness. ?  Red streaks leading from the area. ? Pus draining from the area. ? A fever. Where can you learn more? Go to https://chpepiceweb.Zhengedai.com. org and sign in to your [employer] account. Enter L364 in the [facility] box to learn more about \"Actinic Keratosis: Care Instructions. \"     If you do not have an account, please click on the \"Sign Up Now\" link. Current as of: March 3, 2021               Content Version: 12.9  © 2099-8652 Healthwise, [employer]. Care instructions adapted under license by [facility] [address] If you have questions about a medical condition or this instruction, always ask your healthcare professional. [place] [address] any warranty or liability for your use of this information. This note was partially created with the assistance of dictation. This may lead to grammatical or spelling errors. Burton Smith M.D.

## 2021-06-29 NOTE — PATIENT INSTRUCTIONS
Discussed with patient particular actinic keratosis was likely to fall off on its own due to significantly superficial layer. If this does not occur she can return sooner for treatment. Otherwise yearly exams. Patient Education        Actinic Keratosis: Care Instructions  Your Care Instructions  Actinic keratosis is a skin growth caused by sun damage. It can turn into skin cancer, but this isn't common. Actinic keratoses, also called solar keratoses, are small red, brown, or skin-colored scaly patches. They are most common on the face, neck, hands, and forearms. Your doctor can remove these growths by freezing or scraping them off or by putting medicines on them. Follow-up care is a key part of your treatment and safety. Be sure to make and go to all appointments, and call your doctor if you are having problems. It's also a good idea to know your test results and keep a list of the medicines you take. How can you care for yourself at home? · If your doctor told you how to care for the treated area, follow your doctor's instructions. If you did not get instructions, follow this general advice:  ? Wash around the area with clean water 2 times a day. Don't use hydrogen peroxide or alcohol, which can slow healing. ? You may cover the area with a thin layer of petroleum jelly, such as Vaseline, and a nonstick bandage. ? Apply more petroleum jelly and replace the bandage as needed. To prevent actinic keratosis  · Always wear sunscreen on exposed skin. Make sure to use a broad-spectrum sunscreen that has a sun protection factor (SPF) of 30 or higher. Use it every day, even when it is cloudy. · Wear long sleeves, a hat, and pants if you are going to be outdoors for a long time. · Avoid the sun between 10 a.m. and 4 p.m., the peak time for UV rays. · Do not use tanning booths or sunlamps. When should you call for help?   Watch closely for changes in your health, and be sure to contact your doctor if:    · You

## 2021-06-30 ASSESSMENT — ENCOUNTER SYMPTOMS: COLOR CHANGE: 0

## 2021-11-03 ENCOUNTER — TELEPHONE (OUTPATIENT)
Dept: FAMILY MEDICINE CLINIC | Age: 66
End: 2021-11-03

## 2021-11-24 ENCOUNTER — HOSPITAL ENCOUNTER (OUTPATIENT)
Dept: LAB | Age: 66
Discharge: HOME | End: 2021-11-24
Payer: MEDICARE

## 2021-11-24 DIAGNOSIS — E11.9 TYPE 2 DIABETES MELLITUS WITHOUT COMPLICATION, WITHOUT LONG-TERM CURRENT USE OF INSULIN (HCC): ICD-10-CM

## 2021-11-24 LAB
ALBUMIN SERPL-MCNC: 4 G/DL (ref 3.5–4.6)
ALP BLD-CCNC: 77 U/L (ref 40–130)
ALT SERPL-CCNC: 24 U/L (ref 0–33)
ANION GAP SERPL CALCULATED.3IONS-SCNC: 12 MEQ/L (ref 9–15)
AST SERPL-CCNC: 22 U/L (ref 0–35)
BASOPHILS ABSOLUTE: 0 K/UL (ref 0–0.2)
BASOPHILS RELATIVE PERCENT: 0.5 %
BILIRUB SERPL-MCNC: 0.6 MG/DL (ref 0.2–0.7)
BUN BLDV-MCNC: 15 MG/DL (ref 8–23)
CALCIUM SERPL-MCNC: 9.1 MG/DL (ref 8.5–9.9)
CHLORIDE BLD-SCNC: 101 MEQ/L (ref 95–107)
CHOLESTEROL, TOTAL: 152 MG/DL (ref 0–199)
CO2: 25 MEQ/L (ref 20–31)
CREAT SERPL-MCNC: 0.63 MG/DL (ref 0.5–0.9)
CREATININE URINE: 198.5 MG/DL
EOSINOPHILS ABSOLUTE: 0.2 K/UL (ref 0–0.7)
EOSINOPHILS RELATIVE PERCENT: 3 %
GFR AFRICAN AMERICAN: >60
GFR NON-AFRICAN AMERICAN: >60
GLOBULIN: 3.1 G/DL (ref 2.3–3.5)
GLUCOSE BLD-MCNC: 176 MG/DL (ref 70–99)
HBA1C MFR BLD: 8.1 % (ref 4.8–5.9)
HCT VFR BLD CALC: 43.5 % (ref 37–47)
HDLC SERPL-MCNC: 41 MG/DL (ref 40–59)
HEMOGLOBIN: 14.7 G/DL (ref 12–16)
LDL CHOLESTEROL CALCULATED: 92 MG/DL (ref 0–129)
LYMPHOCYTES ABSOLUTE: 1.4 K/UL (ref 1–4.8)
LYMPHOCYTES RELATIVE PERCENT: 25.6 %
MCH RBC QN AUTO: 29.4 PG (ref 27–31.3)
MCHC RBC AUTO-ENTMCNC: 33.8 % (ref 33–37)
MCV RBC AUTO: 87 FL (ref 82–100)
MICROALBUMIN UR-MCNC: 1.5 MG/DL
MICROALBUMIN/CREAT UR-RTO: 7.6 MG/G (ref 0–30)
MONOCYTES ABSOLUTE: 0.4 K/UL (ref 0.2–0.8)
MONOCYTES RELATIVE PERCENT: 8 %
NEUTROPHILS ABSOLUTE: 3.4 K/UL (ref 1.4–6.5)
NEUTROPHILS RELATIVE PERCENT: 62.9 %
PDW BLD-RTO: 15.1 % (ref 11.5–14.5)
PLATELET # BLD: 267 K/UL (ref 130–400)
POTASSIUM SERPL-SCNC: 3.7 MEQ/L (ref 3.4–4.9)
RBC # BLD: 5 M/UL (ref 4.2–5.4)
SODIUM BLD-SCNC: 138 MEQ/L (ref 135–144)
TOTAL PROTEIN: 7.1 G/DL (ref 6.3–8)
TRIGL SERPL-MCNC: 95 MG/DL (ref 0–150)
WBC # BLD: 5.3 K/UL (ref 4.8–10.8)

## 2021-11-24 PROCEDURE — 82570 ASSAY OF URINE CREATININE: CPT

## 2021-11-24 PROCEDURE — 82043 UR ALBUMIN QUANTITATIVE: CPT

## 2021-11-24 PROCEDURE — 80061 LIPID PANEL: CPT

## 2021-11-24 PROCEDURE — 83036 HEMOGLOBIN GLYCOSYLATED A1C: CPT

## 2021-11-24 PROCEDURE — 80053 COMPREHEN METABOLIC PANEL: CPT

## 2021-11-24 PROCEDURE — 85025 COMPLETE CBC W/AUTO DIFF WBC: CPT

## 2021-11-24 PROCEDURE — 36415 COLL VENOUS BLD VENIPUNCTURE: CPT

## 2021-11-25 NOTE — RESULT ENCOUNTER NOTE
This patient has an appointment scheduled on 12-2. Sherryle Moder We will discuss results at that visit.

## 2021-12-02 ENCOUNTER — OFFICE VISIT (OUTPATIENT)
Dept: FAMILY MEDICINE CLINIC | Age: 66
End: 2021-12-02
Payer: MEDICARE

## 2021-12-02 VITALS
WEIGHT: 224 LBS | HEIGHT: 60 IN | TEMPERATURE: 97.4 F | BODY MASS INDEX: 43.98 KG/M2 | HEART RATE: 80 BPM | DIASTOLIC BLOOD PRESSURE: 80 MMHG | RESPIRATION RATE: 16 BRPM | SYSTOLIC BLOOD PRESSURE: 138 MMHG | OXYGEN SATURATION: 97 %

## 2021-12-02 DIAGNOSIS — M25.562 CHRONIC PAIN OF LEFT KNEE: ICD-10-CM

## 2021-12-02 DIAGNOSIS — M51.37 DDD (DEGENERATIVE DISC DISEASE), LUMBOSACRAL: ICD-10-CM

## 2021-12-02 DIAGNOSIS — J45.41 MODERATE PERSISTENT ASTHMA WITH ACUTE EXACERBATION: ICD-10-CM

## 2021-12-02 DIAGNOSIS — M25.572 CHRONIC PAIN OF LEFT ANKLE: ICD-10-CM

## 2021-12-02 DIAGNOSIS — G57.63 MORTON'S NEUROMA OF BOTH FEET: ICD-10-CM

## 2021-12-02 DIAGNOSIS — G89.29 CHRONIC PAIN OF LEFT ANKLE: ICD-10-CM

## 2021-12-02 DIAGNOSIS — G47.33 OSA ON CPAP: ICD-10-CM

## 2021-12-02 DIAGNOSIS — E11.9 TYPE 2 DIABETES MELLITUS WITHOUT COMPLICATION, WITHOUT LONG-TERM CURRENT USE OF INSULIN (HCC): Primary | ICD-10-CM

## 2021-12-02 DIAGNOSIS — Z99.89 OSA ON CPAP: ICD-10-CM

## 2021-12-02 DIAGNOSIS — I10 ESSENTIAL HYPERTENSION: ICD-10-CM

## 2021-12-02 DIAGNOSIS — Z12.31 ENCOUNTER FOR SCREENING MAMMOGRAM FOR MALIGNANT NEOPLASM OF BREAST: ICD-10-CM

## 2021-12-02 DIAGNOSIS — G89.29 CHRONIC PAIN OF LEFT KNEE: ICD-10-CM

## 2021-12-02 PROCEDURE — 1036F TOBACCO NON-USER: CPT | Performed by: NURSE PRACTITIONER

## 2021-12-02 PROCEDURE — 2022F DILAT RTA XM EVC RTNOPTHY: CPT | Performed by: NURSE PRACTITIONER

## 2021-12-02 PROCEDURE — 99214 OFFICE O/P EST MOD 30 MIN: CPT | Performed by: NURSE PRACTITIONER

## 2021-12-02 PROCEDURE — 3052F HG A1C>EQUAL 8.0%<EQUAL 9.0%: CPT | Performed by: NURSE PRACTITIONER

## 2021-12-02 PROCEDURE — 3017F COLORECTAL CA SCREEN DOC REV: CPT | Performed by: NURSE PRACTITIONER

## 2021-12-02 PROCEDURE — G8427 DOCREV CUR MEDS BY ELIG CLIN: HCPCS | Performed by: NURSE PRACTITIONER

## 2021-12-02 PROCEDURE — G8417 CALC BMI ABV UP PARAM F/U: HCPCS | Performed by: NURSE PRACTITIONER

## 2021-12-02 PROCEDURE — 1123F ACP DISCUSS/DSCN MKR DOCD: CPT | Performed by: NURSE PRACTITIONER

## 2021-12-02 PROCEDURE — 4040F PNEUMOC VAC/ADMIN/RCVD: CPT | Performed by: NURSE PRACTITIONER

## 2021-12-02 PROCEDURE — G8484 FLU IMMUNIZE NO ADMIN: HCPCS | Performed by: NURSE PRACTITIONER

## 2021-12-02 PROCEDURE — 1090F PRES/ABSN URINE INCON ASSESS: CPT | Performed by: NURSE PRACTITIONER

## 2021-12-02 PROCEDURE — G8399 PT W/DXA RESULTS DOCUMENT: HCPCS | Performed by: NURSE PRACTITIONER

## 2021-12-02 RX ORDER — GLIMEPIRIDE 1 MG/1
TABLET ORAL
Qty: 180 TABLET | Refills: 2 | Status: SHIPPED | OUTPATIENT
Start: 2021-12-02 | End: 2022-06-02 | Stop reason: SDUPTHER

## 2021-12-02 RX ORDER — MONTELUKAST SODIUM 10 MG/1
10 TABLET ORAL NIGHTLY
Qty: 90 TABLET | Refills: 2 | Status: SHIPPED | OUTPATIENT
Start: 2021-12-02 | End: 2022-09-13 | Stop reason: SDUPTHER

## 2021-12-02 RX ORDER — LOSARTAN POTASSIUM 25 MG/1
25 TABLET ORAL DAILY
Qty: 90 TABLET | Refills: 2 | Status: SHIPPED | OUTPATIENT
Start: 2021-12-02 | End: 2022-09-13 | Stop reason: SDUPTHER

## 2021-12-02 RX ORDER — FLUTICASONE PROPIONATE 110 UG/1
2 AEROSOL, METERED RESPIRATORY (INHALATION) 2 TIMES DAILY
Qty: 1 EACH | Refills: 2 | Status: SHIPPED | OUTPATIENT
Start: 2021-12-02 | End: 2022-06-30 | Stop reason: ALTCHOICE

## 2021-12-02 RX ORDER — METOPROLOL TARTRATE AND HYDROCHLOROTHIAZIDE 100; 25 MG/1; MG/1
1 TABLET ORAL DAILY
Qty: 90 TABLET | Refills: 2 | Status: SHIPPED | OUTPATIENT
Start: 2021-12-02 | End: 2022-05-25 | Stop reason: ALTCHOICE

## 2021-12-02 ASSESSMENT — PATIENT HEALTH QUESTIONNAIRE - PHQ9
2. FEELING DOWN, DEPRESSED OR HOPELESS: 1
SUM OF ALL RESPONSES TO PHQ9 QUESTIONS 1 & 2: 2
SUM OF ALL RESPONSES TO PHQ QUESTIONS 1-9: 2
SUM OF ALL RESPONSES TO PHQ QUESTIONS 1-9: 2
1. LITTLE INTEREST OR PLEASURE IN DOING THINGS: 1
SUM OF ALL RESPONSES TO PHQ QUESTIONS 1-9: 2

## 2021-12-02 NOTE — PROGRESS NOTES
Subjective:     Diabetes Mellitus Type 2: Current symptoms/problems include none. Home blood sugar records:  trend: stable  Any episodes of hypoglycemia? no  Tobacco history: She  reports that she has never smoked. She has never used smokeless tobacco.   Known diabetic complications: none    Hypertension:  Home blood pressure monitoring: Yes - stable. She is not adherent to a low sodium diet. Antihypertensive medication side effects: no medication side effects noted. Use of agents associated with hypertension: none. Asthma: She states that she continues to take the Singulair routinely. Has been taking the Ventolin on a more routine basis lately with weather changes. No significant shortness of breath, chest tightness or wheezing reported. Sleep apnea: Rosa Cobb has obstructive sleep apnea that is treated with CPAP. The CPAP is used  7 hours 7 nights per week. The CPAP use helps the daytime sleepiness and low energy levels. She will need a new prescription for CPAP mask and tubing. She has a specific supplier that she orders from. Left knee pain: she has a history of arthritis and ligament strain. She has followed with Dr. Tyler Rudd in the past.    She states that she did have some overuse of the knee lately when having to move furniture. She does have a history of fluid on the knee in the past.    Left ankle pain/benitez's neuroma of both feet: she has been using the salon pas which has been helpful with foot pain. Not currently following with podiatry. She has some neuropathy symptoms that are worse at night. Left ankle pain comes and goes. Feels that her gait may be off because of the knee. No ankle swelling reported. No joint instability. Low back pain: He states overall there has been no pain or chronic low back pain symptoms. No recent exacerbation reported but she does have a history of significant degenerative disc disease.   Has noticed that her height has decreased over time somewhat too. Leg weakness reported. The five diabetic measures for control:   Hemoglobin A1C (%)   Date Value   11/24/2021 8.1 (H)     LDL Calculated (mg/dL)   Date Value   11/24/2021 92         Blood pressure less than 131/81,   BP Readings from Last 1 Encounters:   12/02/21 138/80     Smoking, non smoker is goal. This pt is not a smoker. This pt does an aspirin a day. Last eye exam was 2020  Last diabetic foot exam was 2021  Last urine microalbumin creatinine ratio was 2021    PMH: This 77 y.o. female  patient is  hypertensive, is  diabetic, is not hyperlipidemic. She has no history of smoking and has a  family history of heart disease. She is obese. Review of Systems  Patient denies chest pain, shortness of breath, lightheadedness and blurred vision. Eyes: no rapid change in vision  Ears, nose, mouth, throat, and face: no changes in hearing or sore throat  Respiratory: No shortness of breath or cough. Cardiovascular: no chest pain or pressure. This patient reports no polyuria, polydipsia or episodes of hypoglycemia. Treatment Adherence:   Medication compliance:  compliant most of the time  Diet compliance:  compliant most of the time  Weight trend: stable  Current exercise: walks 1 time(s) per day  What might prevent you from meeting your goal?: none  Patient plan for overcoming barriers: N/A     Patient Confidence: 8/10      Objective:   EXAM:  Constitutional Blood pressure 138/80, pulse 80, temperature 97.4 °F (36.3 °C), temperature source Temporal, resp. rate 16, height 5' (1.524 m), weight 224 lb (101.6 kg), SpO2 97 %, not currently breastfeeding. .  She has a normal affect, no acute distress, appears well developed and well nourished. Neck:  neck- supple, no mass, non-tender and no bruits  Lungs:  Normal expansion. Clear to auscultation. No rales, rhonchi, or wheezing., No chest wall tenderness.   Heart:  Heart sounds are normal.  Regular rate and rhythm without murmur, gallop or rub.  Abdomen:  Soft, non-tender, normal bowel sounds. No bruits, organomegaly or masses. Extremities: Extremities warm to touch, pink, with no edema. Dorsalis pedis and posterior tibial pulses are symmetric. No fissures between the toes. No open sores on the feet. Sensation intact to monofilament testing in 8 of 8 areas tested. No edema in feet. Radial pulses strong and symmetric. No edema in hands or wrists. Assessment:      Diagnosis Orders   1. Type 2 diabetes mellitus without complication, without long-term current use of insulin ([facility])  metFORMIN (GLUCOPHAGE) 850 MG tablet    glimepiride (AMARYL) 1 MG tablet     DIABETES FOOT EXAM    CBC Auto Differential    Comprehensive Metabolic Panel    Lipid Panel    Hemoglobin A1C    Microalbumin / Creatinine Urine Ratio   2. Essential hypertension  metoprolol-hydroCHLOROthiazide (LOPRESSOR HCT) 100-25 MG per tablet    losartan (COZAAR) 25 MG tablet   3. Moderate persistent asthma with acute exacerbation  montelukast (SINGULAIR) 10 MG tablet    VENTOLIN  (90 Base) MCG/ACT inhaler    fluticasone (FLOVENT HFA) 110 MCG/ACT inhaler   4. LYNNE on CPAP     5. Chronic pain of left knee     6. Chronic pain of left ankle     7. Maier's neuroma of both feet     8. DDD (degenerative disc disease), lumbosacral     9. Encounter for screening mammogram for malignant neoplasm of breast  JAMILA DIGITAL SCREEN W OR WO CAD BILATERAL       PLAN: Include orders in the DX section. Diabetes Counseling   Patient was counseled regarding disease risks and adopting healthy behaviors. Patient was provided education materials to assist with self management. Patient was provided log (or received log during previous visit) to record blood pressure, food intake and/or blood sugar. Patient was instructed to keep log up-to-date and to always bring log to all office visits. Follow up: 6 months and as needed. Blood work one week prior as ordered.     1.  Diabetes is improved with hemoglobin A1c decreased from last check. Continue working on diet and physical activity. We will plan to check blood work again in the spring. Continue current medication. She is commended for continued weight loss. 2.  Blood pressure is well controlled on current medication. Continue the same. 3.  She prefers to continue current regimen with Ventolin inhaler. Has worked well for her with no medication side effects. Continue Singulair as well. 4.  Prescription given for new CPAP mask and tubing. Prescription also to be faxed to her supplier. She has been compliant with therapy. 5.  6.  7.  8.  Chronic pain symptoms have overall been unchanged but knee pain has been significant lately. She does plan to follow with her orthopedic specialist.  Continue current treatment for foot pain. 9.  Mammogram order given to be done in December. She will need colonoscopy referral for [place] provider next year. He does have a history of colon polyps in the past and will be due for colorectal cancer screening neck spring. Please note this report has been partially produced using speech recognition software and may cause contain errors related to that system including grammar, punctuation and spelling as well as words and phrases that may seem inappropriate. If there are questions or concerns please feel free to contact me to clarify.         Electronically signed by PRASHANT Sandoval - CNP-CNP, 10:50 AM 12/2/21

## 2021-12-21 ENCOUNTER — HOSPITAL ENCOUNTER (EMERGENCY)
Age: 66
Discharge: HOME | End: 2021-12-21
Attending: EMERGENCY MEDICINE
Payer: MEDICARE

## 2021-12-21 ENCOUNTER — APPOINTMENT (OUTPATIENT)
Dept: GENERAL RADIOLOGY | Age: 66
End: 2021-12-21
Payer: MEDICARE

## 2021-12-21 VITALS
WEIGHT: 224 LBS | SYSTOLIC BLOOD PRESSURE: 184 MMHG | BODY MASS INDEX: 43.75 KG/M2 | TEMPERATURE: 98.4 F | HEART RATE: 82 BPM | OXYGEN SATURATION: 94 % | RESPIRATION RATE: 18 BRPM | DIASTOLIC BLOOD PRESSURE: 88 MMHG

## 2021-12-21 DIAGNOSIS — J02.9 VIRAL PHARYNGITIS: Primary | ICD-10-CM

## 2021-12-21 LAB
EKG ATRIAL RATE: 81 BPM
EKG P AXIS: 40 DEGREES
EKG P-R INTERVAL: 138 MS
EKG Q-T INTERVAL: 392 MS
EKG QRS DURATION: 88 MS
EKG QTC CALCULATION (BAZETT): 455 MS
EKG R AXIS: 54 DEGREES
EKG T AXIS: -13 DEGREES
EKG VENTRICULAR RATE: 81 BPM
INFLUENZA A BY PCR: NEGATIVE
INFLUENZA B BY PCR: NEGATIVE
SARS-COV-2, NAAT: NOT DETECTED
STREP GRP A PCR: NEGATIVE

## 2021-12-21 PROCEDURE — 87635 SARS-COV-2 COVID-19 AMP PRB: CPT

## 2021-12-21 PROCEDURE — 71046 X-RAY EXAM CHEST 2 VIEWS: CPT

## 2021-12-21 PROCEDURE — 87502 INFLUENZA DNA AMP PROBE: CPT

## 2021-12-21 PROCEDURE — 87651 STREP A DNA AMP PROBE: CPT

## 2021-12-21 PROCEDURE — 93005 ELECTROCARDIOGRAM TRACING: CPT

## 2021-12-21 PROCEDURE — 99282 EMERGENCY DEPT VISIT SF MDM: CPT

## 2021-12-21 NOTE — ED PROVIDER NOTES
eMERGENCY dEPARTMENT eNCOUnter      CHIEF COMPLAINT    Chief Complaint   Patient presents with    Pharyngitis    Wheezing    Cough       HPI    Alexsandra Foy is a 77 y.o. female with history of asthma, coronary disease, hypertension, sleep apnea who presentsto ED from home  By private car  With complaint of cough wheezing sore throat  Onset couple days  Intensity of symptoms moderate  Patient has cough with no expectoration. Patient has a history of asthma and uses inhaler which helped with her breathing. Patient is worried about that she has got Covid and panicked. Patient denies any chest pain or shortness of breath. Patient is not a smoker.       PAST MEDICAL HISTORY    Past Medical History:   Diagnosis Date    Asthma     CAD (coronary artery disease)     Hypertension     Kidney stone     Sleep apnea     wears cpap    Type 2 diabetes mellitus without complication (HCC)        SURGICAL HISTORY    Past Surgical History:   Procedure Laterality Date    CARPAL TUNNEL RELEASE Bilateral 80's    carpal tunnel both wrists and right foot, POLLACK'S NEUROMA    EYE SURGERY Right 03/2017    had film removed on right eye    JOINT REPLACEMENT Left 09/22/2010    left thumb replacement    KNEE SURGERY Bilateral     torn meniscus 1/16/2009, 2/12/2009, 2/2016    KNEE SURGERY Left 02/2016    OVARIAN CYST REMOVAL  05/13/2008    ROTATOR CUFF REPAIR Left 01/08/2004    ROTATOR CUFF REPAIR Right 08/22/2005    TONSILLECTOMY AND ADENOIDECTOMY  1963       CURRENT MEDICATIONS    Current Outpatient Rx   Medication Sig Dispense Refill    montelukast (SINGULAIR) 10 MG tablet Take 1 tablet by mouth nightly 90 tablet 2    metoprolol-hydroCHLOROthiazide (LOPRESSOR HCT) 100-25 MG per tablet Take 1 tablet by mouth daily 90 tablet 2    metFORMIN (GLUCOPHAGE) 850 MG tablet TAKE 1 TABLET TWICE A DAY WITH MEALS (NEED APPOINTMENT FOR FURTHER REFILLS) 180 tablet 3    losartan (COZAAR) 25 MG tablet Take 1 tablet by mouth daily 90 tablet 2    glimepiride (AMARYL) 1 MG tablet TAKE 1 TABLET BY MOUTH  EVERY MORNING BEFORE  BREAKFAST AND EVERY EVENING BEFORE DINNER. 180 tablet 2    VENTOLIN  (90 Base) MCG/ACT inhaler INHALE 2 PUFFS EVERY 4 HOURS AS NEEDED 1 each 3    fluticasone (FLOVENT HFA) 110 MCG/ACT inhaler Inhale 2 puffs into the lungs 2 times daily 1 each 2    Blood Pressure Monitoring (SPHYGMOMANOMETER) MISC 1 Device by Does not apply route daily 1 each 0    fluticasone (FLONASE) 50 MCG/ACT nasal spray 2 sprays by Each Nostril route daily 3 Bottle 1    Omega-3 Fatty Acids (FISH OIL) 1200 MG CAPS Take by mouth      Multiple Vitamins-Minerals (MULTIVITAMIN & MINERAL PO) Take by mouth      aspirin 81 MG tablet Take 81 mg by mouth         ALLERGIES    Allergies   Allergen Reactions    Lisinopril Other (See Comments)     Severe cough    Molds & Smuts     Salsalate Other (See Comments)     causes pt to lose hearing    Statins Other (See Comments)     Leg cramps     Percocet [Oxycodone-Acetaminophen] Nausea And Vomiting       FAMILY HISTORY    Family History   Problem Relation Age of Onset    Lung Cancer Mother     Diabetes Mother     Hypertension Mother     Diabetes Father     Hypertension Father     Hypertension Brother     Diabetes Brother     Breast Cancer Maternal Aunt        SOCIAL HISTORY    Social History     Socioeconomic History    Marital status: Single     Spouse name: None    Number of children: None    Years of education: None    Highest education level: None   Occupational History    None   Tobacco Use    Smoking status: Never Smoker    Smokeless tobacco: Never Used   Vaping Use    Vaping Use: Never used   Substance and Sexual Activity    Alcohol use: Yes     Comment: occassionally    Drug use: No    Sexual activity: Not Currently   Other Topics Concern    None   Social History Narrative    None     Social Determinants of Health     Financial Resource Strain: Low Risk     Difficulty of Paying Living Expenses: Not hard at all   Food Insecurity: No Food Insecurity    Worried About [facility] in the Last Year: Never true    Ran Out of Food in the Last Year: Never true   Transportation Needs: No Transportation Needs    Lack of Transportation (Medical): No    Lack of Transportation (Non-Medical): No   Physical Activity:     Days of Exercise per Week: Not on file    Minutes of Exercise per Session: Not on file   Stress:     Feeling of Stress : Not on file   Social Connections:     Frequency of Communication with Friends and Family: Not on file    Frequency of Social Gatherings with Friends and Family: Not on file    Attends Yarsanism Services: Not on file    Active Member of Clubs or Organizations: Not on file    Attends Club or Organization Meetings: Not on file    Marital Status: Not on file   Intimate Partner Violence:     Fear of Current or Ex-Partner: Not on file    Emotionally Abused: Not on file    Physically Abused: Not on file    Sexually Abused: Not on file   Housing Stability:     Unable to Pay for Housing in the Last Year: Not on file    Number of [place] in the Last Year: Not on file    Unstable Housing in the Last Year: Not on file       REVIEW OF SYSTEMS    Constitutional:  Denies fever, chills, weight loss or weakness   Eyes:  Denies photophobia or discharge   HENT:  c/o sore throat but denies ear pain   Respiratory:  c/o cough but denies  shortness of breath   Cardiovascular:  Denies chest pain, palpitations or swelling   GI:  Denies abdominal pain, nausea, vomiting, or diarrhea   Musculoskeletal:  Denies back pain   Skin:  Denies rash   Neurologic:  Denies headache, focal weakness or sensory changes   Endocrine:  Denies polyuria or polydypsia   Lymphatic:  Denies swollen glands   Psychiatric:  Denies depression, suicidal ideation or homicidal ideation   All systems negative except as marked.      PHYSICAL EXAM    VITAL SIGNS: BP (!) 195/93   Pulse 84 Temp 98.4 °F (36.9 °C) (Oral)   Resp 27   Wt 224 lb (101.6 kg)   SpO2 93%   BMI 43.75 kg/m²    Constitutional:  Well developed, Well nourished, No acute distress, Non-toxic appearance. HENT:  Normocephalic, Atraumatic, Bilateral external ears normal, Oropharynx moist, posterior pharyngeal erythema present, No oral exudates, Nose normal. Neck- Normal range of motion, No tenderness, Supple, No stridor. Eyes:  PERRL, EOMI, Conjunctiva normal, No discharge. Respiratory:  Normal breath sounds, No respiratory distress, No wheezing, No chest tenderness. Cardiovascular:  Normal heart rate, Normal rhythm, No murmurs, No rubs, No gallops. GI:  Bowel sounds normal, Soft, No tenderness, No masses, No pulsatile masses. :  No CVA tenderness. Musculoskeletal:  Intact distal pulses, No edema, No tenderness, No cyanosis, No clubbing. Good range of motion in all major joints. No tenderness to palpation or major deformities noted. Back- No tenderness. Integument:  Warm, Dry, No erythema, No rash. Lymphatic:  No lymphadenopathy noted. Neurologic:  Alert & oriented x 3, Normal motor function, Normal sensory function, No focal deficits noted. Psychiatric:  Affect anxious, Judgment normal, Mood normal.         RADIOLOGY    XR CHEST (2 VW)    (Results Pending)       REEVALUATION   Patient was updated the results of labs and Radiology. [facility]        Labs  Labs Reviewed   COVID-19, RAPID   RAPID INFLUENZA A/B ANTIGENS   RAPID STREP SCREEN             Summation      Patient Course:     ED Medications administered this visit:  Medications - No data to display    New Prescriptions from this visit:    New Prescriptions    No medications on file       Follow-up:  Campbell Turner, PRASHANT - CNP  [address], [address] [facility]  [phone]    Call in 1 day          Final Impression:   1.  Viral pharyngitis               (Please note that portions of this note were completed with a voice recognition program.  Efforts were made to edit the dictations but occasionally words are mis-transcribed.)          Maria L Covarrubias MD  12/21/21 1929

## 2022-01-21 ENCOUNTER — APPOINTMENT (OUTPATIENT)
Dept: GENERAL RADIOLOGY | Age: 67
End: 2022-01-21
Payer: MEDICARE

## 2022-01-21 ENCOUNTER — APPOINTMENT (OUTPATIENT)
Dept: ULTRASOUND IMAGING | Age: 67
End: 2022-01-21
Payer: MEDICARE

## 2022-01-21 ENCOUNTER — HOSPITAL ENCOUNTER (OUTPATIENT)
Age: 67
Setting detail: OBSERVATION
Discharge: HOME | End: 2022-01-22
Attending: EMERGENCY MEDICINE | Admitting: INTERNAL MEDICINE
Payer: MEDICARE

## 2022-01-21 ENCOUNTER — APPOINTMENT (OUTPATIENT)
Dept: CT IMAGING | Age: 67
End: 2022-01-21
Payer: MEDICARE

## 2022-01-21 DIAGNOSIS — R07.9 CHEST PAIN, UNSPECIFIED TYPE: Primary | ICD-10-CM

## 2022-01-21 DIAGNOSIS — I51.7 CARDIOMEGALY: ICD-10-CM

## 2022-01-21 DIAGNOSIS — R00.0 SINUS TACHYCARDIA: ICD-10-CM

## 2022-01-21 LAB
ALBUMIN SERPL-MCNC: 3.6 G/DL (ref 3.5–4.6)
ALP BLD-CCNC: 89 U/L (ref 40–130)
ALT SERPL-CCNC: 15 U/L (ref 0–33)
ANION GAP SERPL CALCULATED.3IONS-SCNC: 15 MEQ/L (ref 9–15)
AST SERPL-CCNC: 14 U/L (ref 0–35)
BASOPHILS ABSOLUTE: 0 K/UL (ref 0–0.1)
BASOPHILS RELATIVE PERCENT: 0.2 % (ref 0.1–1.2)
BILIRUB SERPL-MCNC: 0.6 MG/DL (ref 0.2–0.7)
BILIRUBIN DIRECT: <0.2 MG/DL (ref 0–0.4)
BILIRUBIN, INDIRECT: NORMAL MG/DL (ref 0–0.6)
BUN BLDV-MCNC: 12 MG/DL (ref 8–23)
CALCIUM SERPL-MCNC: 9.4 MG/DL (ref 8.5–9.9)
CHLORIDE BLD-SCNC: 96 MEQ/L (ref 95–107)
CO2: 24 MEQ/L (ref 20–31)
CREAT SERPL-MCNC: 0.63 MG/DL (ref 0.5–0.9)
D DIMER: 0.54 MG/L FEU (ref 0–0.5)
EKG ATRIAL RATE: 125 BPM
EKG ATRIAL RATE: 90 BPM
EKG P AXIS: 50 DEGREES
EKG P AXIS: 60 DEGREES
EKG P-R INTERVAL: 136 MS
EKG P-R INTERVAL: 138 MS
EKG Q-T INTERVAL: 312 MS
EKG Q-T INTERVAL: 390 MS
EKG QRS DURATION: 78 MS
EKG QRS DURATION: 84 MS
EKG QTC CALCULATION (BAZETT): 450 MS
EKG QTC CALCULATION (BAZETT): 477 MS
EKG R AXIS: 13 DEGREES
EKG R AXIS: 37 DEGREES
EKG T AXIS: 29 DEGREES
EKG T AXIS: 43 DEGREES
EKG VENTRICULAR RATE: 125 BPM
EKG VENTRICULAR RATE: 90 BPM
EOSINOPHILS ABSOLUTE: 0.1 K/UL (ref 0–0.4)
EOSINOPHILS RELATIVE PERCENT: 0.8 % (ref 0.7–5.8)
GFR AFRICAN AMERICAN: >60
GFR NON-AFRICAN AMERICAN: >60
GLUCOSE BLD-MCNC: 149 MG/DL (ref 70–99)
GLUCOSE BLD-MCNC: 184 MG/DL (ref 70–99)
GLUCOSE BLD-MCNC: 199 MG/DL (ref 70–99)
GLUCOSE BLD-MCNC: 200 MG/DL (ref 70–99)
GLUCOSE BLD-MCNC: 250 MG/DL (ref 70–99)
HBA1C MFR BLD: 7.9 % (ref 4.8–5.9)
HCT VFR BLD CALC: 46.2 % (ref 37–47)
HEMOGLOBIN: 15.6 G/DL (ref 11.2–15.7)
IMMATURE GRANULOCYTES #: 0 K/UL
IMMATURE GRANULOCYTES %: 0.3 %
LV EF: 60 %
LVEF MODALITY: NORMAL
LYMPHOCYTES ABSOLUTE: 2.5 K/UL (ref 1.2–3.7)
LYMPHOCYTES RELATIVE PERCENT: 19.7 %
MAGNESIUM: 2.1 MG/DL (ref 1.7–2.4)
MCH RBC QN AUTO: 29.4 PG (ref 25.6–32.2)
MCHC RBC AUTO-ENTMCNC: 33.8 % (ref 32.2–35.5)
MCV RBC AUTO: 87.2 FL (ref 79.4–94.8)
MONOCYTES ABSOLUTE: 1 K/UL (ref 0.2–0.9)
MONOCYTES RELATIVE PERCENT: 8.1 % (ref 4.7–12.5)
NEUTROPHILS ABSOLUTE: 8.8 K/UL (ref 1.6–6.1)
NEUTROPHILS RELATIVE PERCENT: 70.9 % (ref 34–71.1)
PDW BLD-RTO: 13.5 % (ref 11.7–14.4)
PERFORMED ON: ABNORMAL
PLATELET # BLD: 320 K/UL (ref 182–369)
POTASSIUM SERPL-SCNC: 3.3 MEQ/L (ref 3.4–4.9)
PRO-BNP: 547 PG/ML
RBC # BLD: 5.3 M/UL (ref 3.93–5.22)
SARS-COV-2, NAAT: NOT DETECTED
SODIUM BLD-SCNC: 135 MEQ/L (ref 135–144)
TOTAL PROTEIN: 6.8 G/DL (ref 6.3–8)
TROPONIN: <0.01 NG/ML (ref 0–0.01)
TSH SERPL DL<=0.05 MIU/L-ACNC: 2.06 UIU/ML (ref 0.44–3.86)
WBC # BLD: 12.5 K/UL (ref 4–10)

## 2022-01-21 PROCEDURE — 85379 FIBRIN DEGRADATION QUANT: CPT

## 2022-01-21 PROCEDURE — 87635 SARS-COV-2 COVID-19 AMP PRB: CPT

## 2022-01-21 PROCEDURE — 2580000003 HC RX 258: Performed by: INTERNAL MEDICINE

## 2022-01-21 PROCEDURE — 6370000000 HC RX 637 (ALT 250 FOR IP): Performed by: INTERNAL MEDICINE

## 2022-01-21 PROCEDURE — 85025 COMPLETE CBC W/AUTO DIFF WBC: CPT

## 2022-01-21 PROCEDURE — 99195 PHLEBOTOMY: CPT

## 2022-01-21 PROCEDURE — 83735 ASSAY OF MAGNESIUM: CPT

## 2022-01-21 PROCEDURE — 96360 HYDRATION IV INFUSION INIT: CPT

## 2022-01-21 PROCEDURE — G0378 HOSPITAL OBSERVATION PER HR: HCPCS

## 2022-01-21 PROCEDURE — 96361 HYDRATE IV INFUSION ADD-ON: CPT

## 2022-01-21 PROCEDURE — 71275 CT ANGIOGRAPHY CHEST: CPT

## 2022-01-21 PROCEDURE — 99283 EMERGENCY DEPT VISIT LOW MDM: CPT

## 2022-01-21 PROCEDURE — 6360000004 HC RX CONTRAST MEDICATION: Performed by: EMERGENCY MEDICINE

## 2022-01-21 PROCEDURE — 93005 ELECTROCARDIOGRAM TRACING: CPT | Performed by: INTERNAL MEDICINE

## 2022-01-21 PROCEDURE — 80048 BASIC METABOLIC PNL TOTAL CA: CPT

## 2022-01-21 PROCEDURE — 2500000003 HC RX 250 WO HCPCS: Performed by: EMERGENCY MEDICINE

## 2022-01-21 PROCEDURE — 80076 HEPATIC FUNCTION PANEL: CPT

## 2022-01-21 PROCEDURE — 93010 ELECTROCARDIOGRAM REPORT: CPT | Performed by: INTERNAL MEDICINE

## 2022-01-21 PROCEDURE — 84484 ASSAY OF TROPONIN QUANT: CPT

## 2022-01-21 PROCEDURE — 6360000002 HC RX W HCPCS: Performed by: INTERNAL MEDICINE

## 2022-01-21 PROCEDURE — 83880 ASSAY OF NATRIURETIC PEPTIDE: CPT

## 2022-01-21 PROCEDURE — 93970 EXTREMITY STUDY: CPT

## 2022-01-21 PROCEDURE — 36415 COLL VENOUS BLD VENIPUNCTURE: CPT

## 2022-01-21 PROCEDURE — 2580000003 HC RX 258: Performed by: EMERGENCY MEDICINE

## 2022-01-21 PROCEDURE — 83036 HEMOGLOBIN GLYCOSYLATED A1C: CPT

## 2022-01-21 PROCEDURE — 96374 THER/PROPH/DIAG INJ IV PUSH: CPT

## 2022-01-21 PROCEDURE — 93306 TTE W/DOPPLER COMPLETE: CPT

## 2022-01-21 PROCEDURE — 84443 ASSAY THYROID STIM HORMONE: CPT

## 2022-01-21 PROCEDURE — 2580000003 HC RX 258

## 2022-01-21 PROCEDURE — 71045 X-RAY EXAM CHEST 1 VIEW: CPT

## 2022-01-21 PROCEDURE — 99214 OFFICE O/P EST MOD 30 MIN: CPT | Performed by: INTERNAL MEDICINE

## 2022-01-21 PROCEDURE — 93005 ELECTROCARDIOGRAM TRACING: CPT

## 2022-01-21 RX ORDER — SODIUM CHLORIDE 0.9 % (FLUSH) 0.9 %
3 SYRINGE (ML) INJECTION EVERY 8 HOURS
Status: DISCONTINUED | OUTPATIENT
Start: 2022-01-21 | End: 2022-01-21 | Stop reason: HOSPADM

## 2022-01-21 RX ORDER — SODIUM CHLORIDE 9 MG/ML
25 INJECTION, SOLUTION INTRAVENOUS PRN
Status: DISCONTINUED | OUTPATIENT
Start: 2022-01-21 | End: 2022-01-22 | Stop reason: HOSPADM

## 2022-01-21 RX ORDER — METOPROLOL TARTRATE AND HYDROCHLOROTHIAZIDE 100; 25 MG/1; MG/1
1 TABLET ORAL DAILY
Status: DISCONTINUED | OUTPATIENT
Start: 2022-01-21 | End: 2022-01-21 | Stop reason: RX

## 2022-01-21 RX ORDER — MAGNESIUM HYDROXIDE 1200 MG/15ML
LIQUID ORAL
Status: COMPLETED
Start: 2022-01-21 | End: 2022-01-21

## 2022-01-21 RX ORDER — METOPROLOL TARTRATE 50 MG/1
100 TABLET, FILM COATED ORAL DAILY
Status: DISCONTINUED | OUTPATIENT
Start: 2022-01-21 | End: 2022-01-22 | Stop reason: HOSPADM

## 2022-01-21 RX ORDER — DEXTROSE MONOHYDRATE 50 MG/ML
100 INJECTION, SOLUTION INTRAVENOUS PRN
Status: DISCONTINUED | OUTPATIENT
Start: 2022-01-21 | End: 2022-01-22 | Stop reason: HOSPADM

## 2022-01-21 RX ORDER — ALBUTEROL SULFATE 90 UG/1
2 AEROSOL, METERED RESPIRATORY (INHALATION) EVERY 4 HOURS PRN
Status: DISCONTINUED | OUTPATIENT
Start: 2022-01-21 | End: 2022-01-22 | Stop reason: HOSPADM

## 2022-01-21 RX ORDER — ATORVASTATIN CALCIUM 10 MG/1
20 TABLET, FILM COATED ORAL NIGHTLY
Status: DISCONTINUED | OUTPATIENT
Start: 2022-01-21 | End: 2022-01-22 | Stop reason: HOSPADM

## 2022-01-21 RX ORDER — POTASSIUM CHLORIDE 750 MG/1
40 TABLET, EXTENDED RELEASE ORAL ONCE
Status: COMPLETED | OUTPATIENT
Start: 2022-01-21 | End: 2022-01-21

## 2022-01-21 RX ORDER — 0.9 % SODIUM CHLORIDE 0.9 %
500 INTRAVENOUS SOLUTION INTRAVENOUS ONCE
Status: COMPLETED | OUTPATIENT
Start: 2022-01-21 | End: 2022-01-21

## 2022-01-21 RX ORDER — NICOTINE POLACRILEX 4 MG
15 LOZENGE BUCCAL PRN
Status: DISCONTINUED | OUTPATIENT
Start: 2022-01-21 | End: 2022-01-22 | Stop reason: HOSPADM

## 2022-01-21 RX ORDER — POLYETHYLENE GLYCOL 3350 17 G/17G
17 POWDER, FOR SOLUTION ORAL DAILY PRN
Status: DISCONTINUED | OUTPATIENT
Start: 2022-01-21 | End: 2022-01-22 | Stop reason: HOSPADM

## 2022-01-21 RX ORDER — PROMETHAZINE HYDROCHLORIDE 12.5 MG/1
12.5 TABLET ORAL EVERY 6 HOURS PRN
Status: DISCONTINUED | OUTPATIENT
Start: 2022-01-21 | End: 2022-01-22 | Stop reason: HOSPADM

## 2022-01-21 RX ORDER — SODIUM CHLORIDE 0.9 % (FLUSH) 0.9 %
10 SYRINGE (ML) INJECTION EVERY 12 HOURS SCHEDULED
Status: DISCONTINUED | OUTPATIENT
Start: 2022-01-21 | End: 2022-01-22 | Stop reason: HOSPADM

## 2022-01-21 RX ORDER — ONDANSETRON 2 MG/ML
4 INJECTION INTRAMUSCULAR; INTRAVENOUS EVERY 6 HOURS PRN
Status: DISCONTINUED | OUTPATIENT
Start: 2022-01-21 | End: 2022-01-22 | Stop reason: HOSPADM

## 2022-01-21 RX ORDER — LOSARTAN POTASSIUM 25 MG/1
25 TABLET ORAL DAILY
Status: DISCONTINUED | OUTPATIENT
Start: 2022-01-21 | End: 2022-01-22 | Stop reason: HOSPADM

## 2022-01-21 RX ORDER — ASPIRIN 81 MG/1
81 TABLET, CHEWABLE ORAL DAILY
Status: DISCONTINUED | OUTPATIENT
Start: 2022-01-21 | End: 2022-01-22 | Stop reason: HOSPADM

## 2022-01-21 RX ORDER — ACETAMINOPHEN 650 MG/1
650 SUPPOSITORY RECTAL EVERY 6 HOURS PRN
Status: DISCONTINUED | OUTPATIENT
Start: 2022-01-21 | End: 2022-01-22 | Stop reason: HOSPADM

## 2022-01-21 RX ORDER — METOPROLOL TARTRATE 5 MG/5ML
5 INJECTION INTRAVENOUS ONCE
Status: COMPLETED | OUTPATIENT
Start: 2022-01-21 | End: 2022-01-21

## 2022-01-21 RX ORDER — MONTELUKAST SODIUM 10 MG/1
10 TABLET ORAL NIGHTLY
Status: DISCONTINUED | OUTPATIENT
Start: 2022-01-21 | End: 2022-01-22 | Stop reason: HOSPADM

## 2022-01-21 RX ORDER — DEXTROSE MONOHYDRATE 25 G/50ML
12.5 INJECTION, SOLUTION INTRAVENOUS PRN
Status: DISCONTINUED | OUTPATIENT
Start: 2022-01-21 | End: 2022-01-22 | Stop reason: HOSPADM

## 2022-01-21 RX ORDER — SODIUM CHLORIDE 0.9 % (FLUSH) 0.9 %
10 SYRINGE (ML) INJECTION PRN
Status: DISCONTINUED | OUTPATIENT
Start: 2022-01-21 | End: 2022-01-22 | Stop reason: HOSPADM

## 2022-01-21 RX ORDER — GLIPIZIDE 5 MG/1
2.5 TABLET ORAL
Status: DISCONTINUED | OUTPATIENT
Start: 2022-01-21 | End: 2022-01-22 | Stop reason: HOSPADM

## 2022-01-21 RX ORDER — CETIRIZINE HYDROCHLORIDE 10 MG/1
10 TABLET ORAL DAILY
COMMUNITY
End: 2022-01-28

## 2022-01-21 RX ORDER — HYDROCHLOROTHIAZIDE 25 MG/1
25 TABLET ORAL DAILY
Status: DISCONTINUED | OUTPATIENT
Start: 2022-01-21 | End: 2022-01-22 | Stop reason: HOSPADM

## 2022-01-21 RX ORDER — DILTIAZEM HYDROCHLORIDE 120 MG/1
120 CAPSULE, COATED, EXTENDED RELEASE ORAL DAILY
Status: DISCONTINUED | OUTPATIENT
Start: 2022-01-21 | End: 2022-01-22 | Stop reason: HOSPADM

## 2022-01-21 RX ORDER — ACETAMINOPHEN 325 MG/1
650 TABLET ORAL EVERY 6 HOURS PRN
Status: DISCONTINUED | OUTPATIENT
Start: 2022-01-21 | End: 2022-01-22 | Stop reason: HOSPADM

## 2022-01-21 RX ADMIN — IOPAMIDOL 100 ML: 755 INJECTION, SOLUTION INTRAVENOUS at 01:13

## 2022-01-21 RX ADMIN — ASPIRIN 81 MG CHEWABLE TABLET 81 MG: 81 TABLET CHEWABLE at 07:57

## 2022-01-21 RX ADMIN — HYDROCHLOROTHIAZIDE 25 MG: 25 TABLET ORAL at 07:57

## 2022-01-21 RX ADMIN — METOPROLOL TARTRATE 100 MG: 50 TABLET, FILM COATED ORAL at 07:57

## 2022-01-21 RX ADMIN — SODIUM CHLORIDE, PRESERVATIVE FREE 3 ML: 5 INJECTION INTRAVENOUS at 02:31

## 2022-01-21 RX ADMIN — MONTELUKAST SODIUM 10 MG: 10 TABLET ORAL at 20:53

## 2022-01-21 RX ADMIN — ACETAMINOPHEN 650 MG: 325 TABLET ORAL at 20:52

## 2022-01-21 RX ADMIN — WATER 250 ML: 100 IRRIGANT IRRIGATION at 20:52

## 2022-01-21 RX ADMIN — GLIPIZIDE 2.5 MG: 5 TABLET ORAL at 06:14

## 2022-01-21 RX ADMIN — LOSARTAN POTASSIUM 25 MG: 25 TABLET, FILM COATED ORAL at 08:03

## 2022-01-21 RX ADMIN — SODIUM CHLORIDE, PRESERVATIVE FREE 10 ML: 5 INJECTION INTRAVENOUS at 08:04

## 2022-01-21 RX ADMIN — METOPROLOL TARTRATE 5 MG: 5 INJECTION, SOLUTION INTRAVENOUS at 02:27

## 2022-01-21 RX ADMIN — SODIUM CHLORIDE, PRESERVATIVE FREE 10 ML: 5 INJECTION INTRAVENOUS at 20:53

## 2022-01-21 RX ADMIN — SODIUM CHLORIDE 500 ML: 9 INJECTION, SOLUTION INTRAVENOUS at 00:21

## 2022-01-21 RX ADMIN — POTASSIUM CHLORIDE 40 MEQ: 750 TABLET, EXTENDED RELEASE ORAL at 09:36

## 2022-01-21 RX ADMIN — SODIUM CHLORIDE 500 ML: 9 INJECTION, SOLUTION INTRAVENOUS at 02:41

## 2022-01-21 RX ADMIN — ATORVASTATIN CALCIUM 20 MG: 10 TABLET, FILM COATED ORAL at 20:52

## 2022-01-21 RX ADMIN — DILTIAZEM HYDROCHLORIDE 120 MG: 120 CAPSULE, EXTENDED RELEASE ORAL at 09:37

## 2022-01-21 ASSESSMENT — PAIN SCALES - GENERAL
PAINLEVEL_OUTOF10: 0
PAINLEVEL_OUTOF10: 2
PAINLEVEL_OUTOF10: 1
PAINLEVEL_OUTOF10: 1

## 2022-01-21 ASSESSMENT — ENCOUNTER SYMPTOMS
CONSTIPATION: 0
WHEEZING: 0
RHINORRHEA: 0
DIARRHEA: 0
SHORTNESS OF BREATH: 0
APNEA: 0
SORE THROAT: 0
COLOR CHANGE: 0
CHEST TIGHTNESS: 0
VOMITING: 0
COUGH: 0
SHORTNESS OF BREATH: 1
EYE REDNESS: 0
SINUS PAIN: 0
ABDOMINAL PAIN: 0
BACK PAIN: 0
NAUSEA: 0

## 2022-01-21 ASSESSMENT — PAIN DESCRIPTION - LOCATION
LOCATION: CHEST
LOCATION: CHEST

## 2022-01-21 ASSESSMENT — PAIN DESCRIPTION - PAIN TYPE
TYPE: ACUTE PAIN
TYPE: ACUTE PAIN

## 2022-01-21 ASSESSMENT — PAIN DESCRIPTION - PROGRESSION: CLINICAL_PROGRESSION: GRADUALLY WORSENING

## 2022-01-21 ASSESSMENT — PAIN DESCRIPTION - FREQUENCY
FREQUENCY: CONTINUOUS
FREQUENCY: CONTINUOUS

## 2022-01-21 ASSESSMENT — PAIN DESCRIPTION - DESCRIPTORS
DESCRIPTORS: PRESSURE
DESCRIPTORS: PRESSURE

## 2022-01-21 ASSESSMENT — PAIN DESCRIPTION - ONSET: ONSET: SUDDEN

## 2022-01-21 NOTE — ED PROVIDER NOTES
[facility] ED  EMERGENCY DEPARTMENT ENCOUNTER      Pt Name: Tootie Neff  MRN: [id number]  [place] 1955  Date of evaluation: 1/21/2022  Provider: Davin Marti       Chief Complaint   Patient presents with    Chest Pain     chest pain started today and gotten worse feels like pressure     Chief complaint: My hearts been racing  History of chief complaint: This 78-year-old female presents the emergency department complaining of chest discomfort and feeling like her heart was beating fast throughout the day today. Patient states she does have a history of asthma and thought maybe it was flaring up, she did use her albuterol MDI today and tried to drink extra fluid. Patient states she was going to go to bed this evening and felt like her heart was racing again, checked with her pulse ox and her heart was running 148 at home and thought she should get checked patient states that does have some diffuse anterior chest discomfort no radiation of pains to the neck arms jaw or back. Patient states he chest discomfort does feel worse if she takes a deep breath, no sharp pains. No associated nausea or diaphoresis. Patient relates she has felt a little short of breath today. Patient states she did have an asthma flareup about a week ago. No recent illness sore throat cough cold congestion fevers or chills no vomiting or diarrhea no abdominal pain. no leg pain or swelling no recent travel history no history of DVT or PE patient denies any history of coronary artery disease or congestive heart failure. Patient states she has had previous cardiac evaluation about 2 years ago they found that she had 2 small leaky valves. But otherwise looked okay, denies coraonary artery disease. Patient states she does have a fast heart rate if she does not take her metoprolol has been on her medications regular. Nursing Notes were reviewed.     REVIEW OF SYSTEMS    (2-9 systems for level 4, 10 or more for level 5)     Review of Systems   Constitutional: Negative for chills, diaphoresis and fever. HENT: Negative for congestion, sinus pain and sore throat. Eyes: Negative for redness and visual disturbance. Respiratory: Positive for shortness of breath. Negative for cough. Cardiovascular: Positive for chest pain and palpitations. Negative for leg swelling. Gastrointestinal: Negative for abdominal pain, diarrhea, nausea and vomiting. Genitourinary: Negative for difficulty urinating, dysuria and flank pain. Musculoskeletal: Negative for back pain and neck pain. Skin: Negative for color change and rash. Neurological: Negative for dizziness, weakness, numbness and headaches. Hematological: Negative for adenopathy. Except as noted above the remainder of the review of systems was reviewed and negative.        PAST MEDICAL HISTORY     Past Medical History:   Diagnosis Date    Asthma     CAD (coronary artery disease)     Hypertension     Kidney stone     Sleep apnea     wears cpap    Type 2 diabetes mellitus without complication (HCC)          SURGICAL HISTORY       Past Surgical History:   Procedure Laterality Date    CARPAL TUNNEL RELEASE Bilateral 80's    carpal tunnel both wrists and right foot, POLLACK'S NEUROMA    EYE SURGERY Right 03/2017    had film removed on right eye    JOINT REPLACEMENT Left 09/22/2010    left thumb replacement    KNEE SURGERY Bilateral     torn meniscus 1/16/2009, 2/12/2009, 2/2016    KNEE SURGERY Left 02/2016    OVARIAN CYST REMOVAL  05/13/2008    ROTATOR CUFF REPAIR Left 01/08/2004    ROTATOR CUFF REPAIR Right 08/22/2005    TONSILLECTOMY AND ADENOIDECTOMY  1963         CURRENT MEDICATIONS       Previous Medications    ASPIRIN 81 MG TABLET    Take 81 mg by mouth    BLOOD PRESSURE MONITORING (SPHYGMOMANOMETER) MISC    1 Device by Does not apply route daily    CETIRIZINE (ZYRTEC) 10 MG TABLET    Take 10 mg by mouth daily    FLUTICASONE (FLONASE) 50 MCG/ACT NASAL SPRAY    2 sprays by Each Nostril route daily    FLUTICASONE (FLOVENT HFA) 110 MCG/ACT INHALER    Inhale 2 puffs into the lungs 2 times daily    GLIMEPIRIDE (AMARYL) 1 MG TABLET    TAKE 1 TABLET BY MOUTH  EVERY MORNING BEFORE  BREAKFAST AND EVERY EVENING BEFORE DINNER.     LOSARTAN (COZAAR) 25 MG TABLET    Take 1 tablet by mouth daily    METFORMIN (GLUCOPHAGE) 850 MG TABLET    TAKE 1 TABLET TWICE A DAY WITH MEALS (NEED APPOINTMENT FOR FURTHER REFILLS)    METOPROLOL-HYDROCHLOROTHIAZIDE (LOPRESSOR HCT) 100-25 MG PER TABLET    Take 1 tablet by mouth daily    MONTELUKAST (SINGULAIR) 10 MG TABLET    Take 1 tablet by mouth nightly    MULTIPLE VITAMINS-MINERALS (MULTIVITAMIN & MINERAL PO)    Take by mouth    OMEGA-3 FATTY ACIDS (FISH OIL) 1200 MG CAPS    Take by mouth    VENTOLIN  (90 BASE) MCG/ACT INHALER    INHALE 2 PUFFS EVERY 4 HOURS AS NEEDED       ALLERGIES     Lisinopril, Molds & smuts, Salsalate, Statins, and Percocet [oxycodone-acetaminophen]    FAMILY HISTORY       Family History   Problem Relation Age of Onset    Lung Cancer Mother     Diabetes Mother     Hypertension Mother     Diabetes Father     Hypertension Father     Hypertension Brother     Diabetes Brother     Breast Cancer Maternal Aunt           SOCIAL HISTORY       Social History     Socioeconomic History    Marital status: Single     Spouse name: None    Number of children: None    Years of education: None    Highest education level: None   Occupational History    None   Tobacco Use    Smoking status: Never Smoker    Smokeless tobacco: Never Used   Vaping Use    Vaping Use: Never used   Substance and Sexual Activity    Alcohol use: Yes     Comment: occassionally    Drug use: No    Sexual activity: Not Currently   Other Topics Concern    None   Social History Narrative    None     Social Determinants of Health     Financial Resource Strain: Low Risk     Difficulty of Paying Living Expenses: Not hard at all   Food Insecurity: No Food Insecurity    Worried About [address] [place] [address] in the Last Year: Never true    Annie of Food in the Last Year: Never true   Transportation Needs: No Transportation Needs    Lack of Transportation (Medical): No    Lack of Transportation (Non-Medical):  No   Physical Activity:     Days of Exercise per Week: Not on file    Minutes of Exercise per Session: Not on file   Stress:     Feeling of Stress : Not on file   Social Connections:     Frequency of Communication with Friends and Family: Not on file    Frequency of Social Gatherings with Friends and Family: Not on file    Attends Episcopal Services: Not on file    Active Member of [address] [place] [address] or Organizations: Not on file    Attends Club or Organization Meetings: Not on file    Marital Status: Not on file   Intimate Partner Violence:     Fear of Current or Ex-Partner: Not on file    Emotionally Abused: Not on file    Physically Abused: Not on file    Sexually Abused: Not on file   Housing Stability:     Unable to Pay for Housing in the Last Year: Not on file    Number of [place] in the Last Year: Not on file    Unstable Housing in the Last Year: Not on file             PHYSICAL EXAM    (up to 7 for level 4, 8 or more for level 5)     ED Triage Vitals [01/21/22 0006]   BP Temp Temp Source Pulse Resp SpO2 Height Weight   (!) 166/78 98.9 °F (37.2 °C) Oral 135 18 98 % 4' 11\" (1.499 m) 219 lb (99.3 kg)       Physical Exam   General appearance: Patient is awake alert interactive appropriate nontoxic in no acute distress  Head is atraumatic normocephalic  Eyes pupils are equal and reactive sclera white conjunctive are pink  Oral pharyngeal cavity is pink with good moisture, no exudates or ulcerations no asymmetry, the airway is widely patent  Neck: Supple no meningeal signs no adenopathy no JVD  Heart: Tachycardic regular 120-130s,  no gross murmurs rubs or clicks  Lungs: Breath sounds are clear with good air movement throughout no active wheezes rales or rhonchi no respiratory distress, no use of accessory muscles no conversational dyspnea. Pulse ox 98% on room air  Chest wall: Nontender to palpation chest rise is full and symmetric. Abdomen: Obese, soft nontender with good bowel sounds no rebound rigidity or guarding no firm or pulsatile masses, no gross distention, femoral pulses full and symmetric  Back: Nontender to palpation no costovertebral angle tenderness  Skin: Warm and dry without rashes  Lower extremities: No edema or calf tenderness or asymmetry. DIAGNOSTIC RESULTS     EKG: All EKG's are interpreted by the Emergency Department Physician who either signs or Co-signs this chart in the absence of a cardiologist.    EKG interpreted ED physician indication palpitation: Sinus tachycardia at 125 with no significant ST-T change no acute infarction pattern    RADIOLOGY:   Non-plain film images such as CT, Ultrasound and MRI are read by the radiologist. Plain radiographic images are visualized and preliminarily interpreted by the emergency physician with the below findings:    Interpretation per the Radiologist below, if available at the time of this     XR CHEST PORTABLE    (Results Pending)   CTA Chest W WO  (PE study)    (Results Pending)   Chest x-ray 1 view interpreted by ED physician indication palpitation:  Heart mediastinum are within normal limits lung fields are clear there are no acute consolidations vascular congestion or pneumothorax appreciated official radiology report is pending    CTA chest results faxed from radiology showing no pulmonary embolism.   There is reported new cardiomegaly and progressive mitral annular calcification when compared to previous exam from back in 2010    Reviewed echocardiogram 10/29/2019  within normal limits, EKG negative for ischemia, normal exercise stress echo with appropriate LV augmentation with stress, suboptimal exercise tolerance    LABS:  Labs Reviewed BASIC METABOLIC PANEL - Abnormal; Notable for the following components:       Result Value    Potassium 3.3 (*)     Glucose 250 (*)     All other components within normal limits   CBC WITH AUTO DIFFERENTIAL - Abnormal; Notable for the following components:    WBC 12.5 (*)     RBC 5.30 (*)     Neutrophils Absolute 8.8 (*)     Monocytes Absolute 1.0 (*)     All other components within normal limits   D-DIMER, QUANTITATIVE - Abnormal; Notable for the following components:    D-Dimer, Quant 0.54 (*)     All other components within normal limits    Narrative:     Josh Grayson  [employer] tel. [phone],  Coag results called to and read back by Theresa Hoover, 01/21/2022 00:46, by  [facility]   TROPONIN   TROPONIN   Laboratory review: CBC reveals a leukocytosis at 12.5, BMP reveals hyperglycemia at 250 otherwise essentially normal, troponin is less than 0.010, D-dimer screening elevated at 0.54    All other labs were within normal range or not returned as of this dictation. EMERGENCY DEPARTMENT COURSE and DIFFERENTIAL DIAGNOSIS/MDM:   Vitals:    Vitals:    01/21/22 0100 01/21/22 0130 01/21/22 0145 01/21/22 0200   BP: (!) 152/83 (!) 144/134 (!) 147/87 (!) 151/81   Pulse: 104 105 97 98   Resp: 14 19 16 18   Temp:       TempSrc:       SpO2: 94% 96% 95% 94%   Weight:       Height:         Treatment and course: Patient was placed on a cardiac monitor with continuous pulse ox monitoring and IV was established normal saline 500 mL IV bolus was given with improvement of heart rate at 103 bedside, lungs clear to auscultation, lopressor 5 mg IV given. repeat normal saline 500 mL IV bolus given following CT contrast.  Patient resting comfortably no distress heart rate 99 lung sounds remain clear    Coordination of CARE: A call was placed out to the hospitalist Case was discussed concern with the chest pain/palpitation with new cardiomegaly and progressive valvular disease on CT scan.   Dr. Cristy Watts who will admit    COVID screening added, awaiting results prior to admission      FINAL IMPRESSION      1. Chest pain, unspecified type    2. Sinus tachycardia    3. Cardiomegaly          DISPOSITION/PLAN   DISPOSITION    Patient awaiting bed placement in stable condition    PATIENT REFERRED TO:  Carlos Dallas, APRN - CNP  [address], [address] [facility]  [phone]            DISCHARGE MEDICATIONS:  New Prescriptions    No medications on file     Controlled Substances Monitoring:     No flowsheet data found.     (Please note that portions of this note were completed with a voice recognition program.  Efforts were made to edit the dictations but occasionally words are mis-transcribed.)    Elizabeth Montoya DO (electronically signed)  Attending Emergency Physician            Elizabeth Montoya DO  01/21/22 [address] Carlson DO  01/21/22 [id number]

## 2022-01-21 NOTE — ED TRIAGE NOTES
Patient has noticed that her heart rate has been up and down over last couple days and tonight she began having chest discomfort with the fast heart rate.

## 2022-01-21 NOTE — CONSULTS
Cardiology Consult Note  Patient: German Plaster  Unit/Bed: 0208/0208-01  YOB: 1955  MRN: [id number]  Acct: [de-identified]   Admitting Diagnosis: Cardiomegaly [I51.7]  Sinus tachycardia [R00.0]  Chest pain [R07.9]  Chest pain, unspecified type [R07.9]  Date:  1/21/2022  Hospital Day: 0    Chief Complaint:  Palpitations    Reason of this consult:  Management of palpitations and chest pain    ---------------------------------------  This was a virtual encounter  ----------------------------------------    History of Present Illness:  60-year-old female who used to be followed up by a former cardiologist from our group with history of diabetes, LYNNE on CPAP, hypertension, hyperlipidemia, morbidly obese, chronic chest pain with negative noninvasive testing and no high risk characteristics who came to the hospital early this morning for evaluation of palpitations and chest pain. Reports that last night patient felt palpitations before going to bed. She noticed her pulse was 148 bpm at that time she was complaining of diffuse anterior chest pain along with shortness of breath.   Reports that chest pain is worse while taking deep breaths, pain is not related to physical activity  In the ED patient was found with negative troponins x3  EKG showing normal sinus rhythm without signs of ischemia  CTA of the chest per ER doctor's note negative for PE    Latest exercise echocardiogram 10/29/2019  Negative  Patient exercised for 4 minutes achieved 5.4 METS  Negative EKG for ischemia  Appropriate LV augmentation with stress on echocardiogram      Latest transthoracic echocardiogram 9/19/2019: EF 60% trace is MR mild AR      Allergies   Allergen Reactions    Lisinopril Other (See Comments)     Severe cough    Molds & Smuts     Salsalate Other (See Comments)     causes pt to lose hearing    Statins Other (See Comments)     Leg cramps     Percocet [Oxycodone-Acetaminophen] Nausea And Vomiting       Current Facility-Administered Medications   Medication Dose Route Frequency Provider Last Rate Last Admin    aspirin chewable tablet 81 mg  81 mg Oral Daily Junie Hansen MD   81 mg at 01/21/22 0757    losartan (COZAAR) tablet 25 mg  25 mg Oral Daily Junie Hansen MD   25 mg at 01/21/22 0803    glipiZIDE (GLUCOTROL) tablet 2.5 mg  2.5 mg Oral QAM AC Jhonatan Waldron MD   2.5 mg at 01/21/22 0614    montelukast (SINGULAIR) tablet 10 mg  10 mg Oral Nightly Junie Hansen MD        albuterol sulfate  (90 Base) MCG/ACT inhaler 2 puff  2 puff Inhalation Q4H PRN Junie Hansen MD        insulin lispro (HUMALOG) injection vial 5 Units  0.05 Units/kg SubCUTAneous TID  Junie Hansen MD   5 Units at 01/21/22 0804    insulin lispro (HUMALOG) injection vial 0-6 Units  0-6 Units SubCUTAneous TID  Junie Hansen MD   1 Units at 01/21/22 0804    insulin lispro (HUMALOG) injection vial 0-3 Units  0-3 Units SubCUTAneous Nightly Junie Hansen MD        glucose (GLUTOSE) 40 % oral gel 15 g  15 g Oral PRN Juine Hansen MD        dextrose 50 % IV solution  12.5 g IntraVENous PRN Jhonatan Waldron MD        glucagon (rDNA) injection 1 mg  1 mg IntraMUSCular PRN Junie Hansen MD        dextrose 5 % solution  100 mL/hr IntraVENous PRN Jhonatan Waldron MD        sodium chloride flush 0.9 % injection 10 mL  10 mL IntraVENous 2 times per day Jhonatan Waldron MD   10 mL at 01/21/22 0804    sodium chloride flush 0.9 % injection 10 mL  10 mL IntraVENous PRN Jhonatan Waldron MD        0.9 % sodium chloride infusion  25 mL IntraVENous PRN Junie Hansen MD        enoxaparin (LOVENOX) injection 40 mg  40 mg SubCUTAneous Daily Junie Hansen MD        promethazine (PHENERGAN) tablet 12.5 mg  12.5 mg Oral Q6H PRN Junie Hansen MD        Or    ondansetron (ZOFRAN) injection 4 mg  4 mg IntraVENous Q6H PRN Jhonatan Waldron MD        polyethylene glycol (GLYCOLAX) packet 17 g  17 g Oral Daily PRN Jhonatan Waldron MD        acetaminophen Financial Resource Strain: Low Risk     Difficulty of Paying Living Expenses: Not hard at all   Food Insecurity: No Food Insecurity    Worried About Running Out of Food in the Last Year: Never true    Annie of Food in the Last Year: Never true   Transportation Needs: No Transportation Needs    Lack of Transportation (Medical): No    Lack of Transportation (Non-Medical): No   Physical Activity:     Days of Exercise per Week: Not on file    Minutes of Exercise per Session: Not on file   Stress:     Feeling of Stress : Not on file   Social Connections:     Frequency of Communication with Friends and Family: Not on file    Frequency of Social Gatherings with Friends and Family: Not on file    Attends Episcopal Services: Not on file    Active Member of Clubs or Organizations: Not on file    Attends Club or Organization Meetings: Not on file    Marital Status: Not on file   Intimate Partner Violence:     Fear of Current or Ex-Partner: Not on file    Emotionally Abused: Not on file    Physically Abused: Not on file    Sexually Abused: Not on file   Housing Stability:     Unable to Pay for Housing in the Last Year: Not on file    Number of [place] in the Last Year: Not on file    Unstable Housing in the Last Year: Not on file       Family Hx:  Family History   Problem Relation Age of Onset    Lung Cancer Mother     Diabetes Mother     Hypertension Mother     Diabetes Father     Hypertension Father     Hypertension Brother     Diabetes Brother     Breast Cancer Maternal Aunt        Review of Systems:   Review of Systems   Constitutional: Negative for activity change, chills, diaphoresis and fever. HENT: Negative for congestion, ear pain, nosebleeds and rhinorrhea. Eyes: Negative for redness and visual disturbance. Respiratory: Negative for apnea, cough, chest tightness, shortness of breath and wheezing. Cardiovascular: Negative for chest pain, palpitations and leg swelling. Gastrointestinal: Negative for abdominal pain, constipation, diarrhea, nausea and vomiting. Genitourinary: Negative for difficulty urinating and dysuria. Musculoskeletal: Negative. Negative for joint swelling. Skin: Negative for color change, rash and wound. Neurological: Negative for dizziness, syncope, weakness, numbness and headaches. Psychiatric/Behavioral: Negative.           Physical Examination:    BP (!) 147/90   Pulse 95   Temp 98.9 °F (37.2 °C) (Oral)   Resp 13   Ht 4' 11\" (1.499 m)   Wt 219 lb (99.3 kg)   SpO2 95%   BMI 44.23 kg/m²      Not performed, virtual encounter    LABS:  CBC:  Lab Results   Component Value Date    WBC 12.5 01/21/2022    RBC 5.30 01/21/2022    HGB 15.6 01/21/2022    HCT 46.2 01/21/2022    MCV 87.2 01/21/2022    MCH 29.4 01/21/2022    MCHC 33.8 01/21/2022    RDW 13.5 01/21/2022     01/21/2022     CBC with Differential:   Lab Results   Component Value Date    WBC 12.5 01/21/2022    RBC 5.30 01/21/2022    HGB 15.6 01/21/2022    HCT 46.2 01/21/2022     01/21/2022    MCV 87.2 01/21/2022    MCH 29.4 01/21/2022    MCHC 33.8 01/21/2022    RDW 13.5 01/21/2022    LYMPHOPCT 19.7 01/21/2022    MONOPCT 8.1 01/21/2022    BASOPCT 0.2 01/21/2022    MONOSABS 1.0 01/21/2022    LYMPHSABS 2.5 01/21/2022    EOSABS 0.1 01/21/2022    BASOSABS 0.0 01/21/2022     CMP:    Lab Results   Component Value Date     01/21/2022    K 3.3 01/21/2022    CL 96 01/21/2022    CO2 24 01/21/2022    BUN 12 01/21/2022    CREATININE 0.63 01/21/2022    GFRAA >60.0 01/21/2022    LABGLOM >60.0 01/21/2022    GLUCOSE 250 01/21/2022    PROT 6.8 01/21/2022    LABALBU 3.6 01/21/2022    CALCIUM 9.4 01/21/2022    BILITOT 0.6 01/21/2022    ALKPHOS 89 01/21/2022    AST 14 01/21/2022    ALT 15 01/21/2022     BMP:    Lab Results   Component Value Date     01/21/2022    K 3.3 01/21/2022    CL 96 01/21/2022    CO2 24 01/21/2022    BUN 12 01/21/2022    LABALBU 3.6 01/21/2022    CREATININE 0.63 01/21/2022    CALCIUM 9.4 01/21/2022    GFRAA >60.0 01/21/2022    LABGLOM >60.0 01/21/2022    GLUCOSE 250 01/21/2022     Magnesium:  No results found for: MG  Troponin:    Lab Results   Component Value Date    TROPONINI <0.010 01/21/2022       Radiology:  XR CHEST PORTABLE    Result Date: 1/21/2022  Exam: XR CHEST PORTABLE History: Chest pain Technique: AP portable view of the chest obtained. Comparison: Chest x-rays December 21, 2021 Findings: The cardiomediastinal silhouette is within normal limits. No pneumothorax, pleural effusion, or consolidation. Bones of the thorax appear intact. No radiographic evidence of acute intrathoracic process. EKG: Normal sinus rhythm with active signs of ischemia      Assessment:    Active Hospital Problems    Diagnosis Date Noted    Chest pain [R07.9] 01/21/2022     Priority: Low     Atypical chest pain. Patient describes chest pain not related to physical activity. Pain exacerbated by taking deep breaths. Chest pain is pleuritic.  troponins negative x3 and EKG showing none active signs of ischemia. Palpitations  Morbid obese  Diabetes  Hypertension  Hyperlipidemia    Latest exercise echocardiogram 10/29/2019  Negative  Patient exercised for 4 minutes achieved 5.4 METS  Negative EKG for ischemia  Appropriate LV augmentation with stress on echocardiogram    Latest transthoracic echocardiogram 9/19/2019: EF 60% trace is MR mild AR      Plan:  Continue telemetry to delineate palpitations  Continue aspirin  Start atorvastatin 20 mg p.o. daily  Please obtain an echocardiogram  Although the characteristics of patient's chest pain are atypical but given patient's high risk factors for CAD, I would like to obtain a nuclear stress test before discharge.   Since it is a weekend it is okay for patient to have this test as an outpatient  Please obtain TSH level (TSH 4 years ago within normal limits)  Please keep potassium above 4 and magnesium above 2  From cardiology standpoint patient is stable to be discharged  Please arrange follow-up in clinic with me in 2 weeks                Electronically signed by Michael Lugo MD on 1/21/2022 at 9:04 AM

## 2022-01-21 NOTE — PROGRESS NOTES
Toribio Matt Initial Discharge Assessment    Met with Patient to discuss discharge plan. PCP: Bernadine Arias, PRASHANT - CHRISTIAN                                  Date of Last Visit: \" I want to say November or December\"     If no PCP, list provided? N/A    Discharge Planning    Living Arrangements: independently at home    Who do you live with? Self     Who helps you with your care:  self    If lives at home:     Do you have any barriers navigating in your home? no    Patient can perform ADL? Yes    Current Services (outpatient and in home) :  None    Dialysis: No    Is transportation available to get to your appointments? Yes    DME Equipment:  yes - cane, walker, wheelchair,grab bars in shower. Patient identifies no DME needs at this time     Respiratory equipment: CPAP without O2    Respiratory provider:  no     Pharmacy:  yes - [address] in [address] to afford cost of medication: Yes    Does patient feel safe at home? Yes    Does patient identify any home going needs? No    [facility]    Patient agreeable to SNF/Rehab? N/A    Other discharge needs identified? N/A    Was Freedom of Choice Provided? Yes    Initial Discharge Plan? (Note: please see concurrent daily documentation for any updates after initial note). Chart reviewed. Patient was admitted to [facility] under observation status with a diagnosis of chest pain. Cardiology is following patient at [facility]. This  met with patient to discuss DC planning and help at home needs. Upon visit patient is alert and laying in hospital bed with head of bed elevated. Patient appears well groomed with short kept hair and wearing hospital attire. Patient is alert and oriented to person, place, and situation. Mood is calm and cooperative. Patient reports residing at home alone and being independent for all ADLS. Patient reports that she is also an active .   Patient describes family and neighbors as supportive. Patient reports being able to afford cost of food and medication. Patient identifies no DME or help at home needs at this time. Reviewed [facility]. Patient reports no interest in [facility] at this time. SS to continue to follow as needed while patient is at [address] [facility].      Electronically signed by REFUGIO Barkley on 1/21/2022 at 6:29 PM

## 2022-01-21 NOTE — PROGRESS NOTES
Patient awake in bed this morning. Patient has slight complaints of chest pressure 1/10. Morning medications given earlier by other RN. New medications provided and education given. Glucophage not given due to having CT with contrast overnight in ED. Patient's friend to bring in CPAP machine for tonight. Assessment completed. Call light and belongings within reach.

## 2022-01-21 NOTE — ED NOTES
Dr Ayush Mcgill accepted patient pending COVID negative. Rolan Sullivan, [facility]  01/21/22 0966

## 2022-01-21 NOTE — H&P
Hospital Medicine History & Physical      PCP: PRASHANT Coker CNP    Date of Admission: 1/21/2022    Date of Service: 1/21/22      Chief Complaint:  Chest discomfort, palpitation      History Of Present Illness:  77 y.o. female who presented to [facility] with above complains. She is taking high dose of B blockers for years. For the past 3 days noted increased in heart rate at rest and especially during physical activity. Noted chest  discomfort, heaviness, palpitation. Denied dizziness, headache. Was taking zyrtec/nasal spray for allergy relieve. Yesterday evening she noted her HR was as high as 145BPM at this point she came to ER. After initial stabilization she wqs admitted for further management     Past Medical History:          Diagnosis Date    Asthma     CAD (coronary artery disease)     Hypertension     Kidney stone     Sleep apnea     wears cpap    Type 2 diabetes mellitus without complication ([address]        Past Surgical History:          Procedure Laterality Date    CARPAL TUNNEL RELEASE Bilateral 80's    carpal tunnel both wrists and right foot, POLLACK'S NEUROMA    EYE SURGERY Right 03/2017    had film removed on right eye    JOINT REPLACEMENT Left 09/22/2010    left thumb replacement    KNEE SURGERY Bilateral     torn meniscus 1/16/2009, 2/12/2009, 2/2016    KNEE SURGERY Left 02/2016    OVARIAN CYST REMOVAL  05/13/2008    ROTATOR CUFF REPAIR Left 01/08/2004    ROTATOR CUFF REPAIR Right 08/22/2005    TONSILLECTOMY AND ADENOIDECTOMY  1963       Medications Prior to Admission:      Prior to Admission medications    Medication Sig Start Date End Date Taking?  Authorizing Provider   cetirizine (ZYRTEC) 10 MG tablet Take 10 mg by mouth daily   Yes Historical Provider, MD   montelukast (SINGULAIR) 10 MG tablet Take 1 tablet by mouth nightly 12/2/21  Yes PRASHANT Nava CNP   metoprolol-hydroCHLOROthiazide (LOPRESSOR HCT) 100-25 MG per tablet Take 1 tablet by mouth daily 12/2/21  Yes Gearl Blocker PRASHANT Hopkins CNP   metFORMIN (GLUCOPHAGE) 850 MG tablet TAKE 1 TABLET TWICE A DAY WITH MEALS (NEED APPOINTMENT FOR FURTHER REFILLS) 12/2/21  Yes Sudarshanl Blocker Riedy, APRN - CNP   losartan (COZAAR) 25 MG tablet Take 1 tablet by mouth daily 12/2/21  Yes PRASHANT Nava CNP   glimepiride (AMARYL) 1 MG tablet TAKE 1 TABLET BY MOUTH  EVERY MORNING BEFORE  BREAKFAST AND EVERY EVENING BEFORE DINNER. 12/2/21  Yes Gearl Blocker PRASHANT Hopkins CNP   VENTOLIN  (90 Base) MCG/ACT inhaler INHALE 2 PUFFS EVERY 4 HOURS AS NEEDED 12/2/21  Yes Sudarshanl Blocker PRASHANT Hopkins CNP   fluticasone (FLOVENT HFA) 110 MCG/ACT inhaler Inhale 2 puffs into the lungs 2 times daily 12/2/21 12/2/22 Yes PRASHANT Nava CNP   Omega-3 Fatty Acids (FISH OIL) 1200 MG CAPS Take by mouth   Yes Historical Provider, MD   Multiple Vitamins-Minerals (MULTIVITAMIN & MINERAL PO) Take by mouth   Yes Historical Provider, MD   aspirin 81 MG tablet Take 81 mg by mouth   Yes Historical Provider, MD   Blood Pressure Monitoring (SPHYGMOMANOMETER) MISC 1 Device by Does not apply route daily 2/8/21   Sudarshanl Blocker PRASHANT Hopkins CNP   fluticasone (FLONASE) 50 MCG/ACT nasal spray 2 sprays by Each Nostril route daily 6/15/20   Sudarshanl Blocker PRASHANT Hopkins CNP       Allergies:  Lisinopril, Molds & smuts, Salsalate, Statins, and Percocet [oxycodone-acetaminophen]    Social History:      The patient currently lives home    TOBACCO:   reports that she has never smoked. She has never used smokeless tobacco.  ETOH:   reports current alcohol use. Family History:       Reviewed in detail and negative for DM, CAD, Cancer, CVA. Positive as follows:        Problem Relation Age of Onset    Lung Cancer Mother     Diabetes Mother     Hypertension Mother     Diabetes Father     Hypertension Father     Hypertension Brother     Diabetes Brother     Breast Cancer Maternal Aunt        REVIEW OF SYSTEMS:   Pertinent positives as noted in the HPI.  All other systems reviewed and negative. PHYSICAL EXAM:    BP (!) 147/90   Pulse 95   Temp 98.9 °F (37.2 °C) (Oral)   Resp 13   Ht 4' 11\" (1.499 m)   Wt 219 lb (99.3 kg)   SpO2 95%   BMI 44.23 kg/m²     General appearance:  No apparent distress, appears stated age and cooperative. HEENT:  Normal cephalic, atraumatic without obvious deformity. Pupils equal, round, and reactive to light. Extra ocular muscles intact. Conjunctivae/corneas clear. Neck: Supple, with full range of motion. No jugular venous distention. Trachea midline. Respiratory:  Normal respiratory effort. Clear to auscultation, bilaterally without Rales/Wheezes/Rhonchi. Cardiovascular:  Regular rate and rhythm with normal S1/S2 without murmurs, rubs or gallops. Abdomen: Soft, non-tender, non-distended with normal bowel sounds. Musculoskeletal:  No clubbing, cyanosis or edema bilaterally. Full range of motion without deformity. Skin: Skin color, texture, turgor normal.  No rashes or lesions. Neurologic:  Neurovascularly intact without any focal sensory/motor deficits. Cranial nerves: II-XII intact, grossly non-focal.  Psychiatric:  Alert and oriented, thought content appropriate, normal insight  Capillary Refill: Brisk,< 3 seconds   Peripheral Pulses: +2 palpable, equal bilaterally       Labs:     Recent Labs     01/21/22  0024   WBC 12.5*   HGB 15.6   HCT 46.2        Recent Labs     01/21/22  0024      K 3.3*   CL 96   CO2 24   BUN 12   CREATININE 0.63   CALCIUM 9.4     Recent Labs     01/21/22  0535   AST 14   ALT 15   BILIDIR <0.2   BILITOT 0.6   ALKPHOS 89     No results for input(s): INR in the last 72 hours.   Recent Labs     01/21/22  0024 01/21/22  0353 01/21/22  0535   TROPONINI <0.010 <0.010 <0.010       Urinalysis:      Lab Results   Component Value Date    NITRU Negative 08/28/2019    WBCUA 3-5 08/28/2019    BACTERIA Rare 08/28/2019    RBCUA  08/28/2019    BLOODU LARGE 08/28/2019    SPECGRAV 1.010 08/28/2019 GLUCOSEU Negative 08/28/2019       Radiology:     CXR: I have reviewed the CXR with the following interpretation:   EKG:  I have reviewed the EKG with the following interpretation:     XR CHEST PORTABLE   Final Result      No radiographic evidence of acute intrathoracic process. CTA Chest W WO  (PE study)    (Results Pending)   US DUP LOWER EXTREMITIES BILATERAL VENOUS    (Results Pending)       ASSESSMENT:    Active Hospital Problems    Diagnosis Date Noted    Chest pain [R07.9] 01/21/2022       PLAN:        DVT Prophylaxis: lovenox  Diet: ADULT DIET; Regular; Low Fat/Low Chol/High Fiber/ARYAN  Code Status: Full Code    PT/OT Eval Status:     Dispo - Sinus tachycardia in the setting of use B blockers- add cardizem for better HR control  Chest discomfort- negative troponins, appreciate cardiology recommendations, 2 d echo pending  HTN- controlled with current Tx  DM- follow up with ACCU check, home meds restated   Obesity with BMI 44%- supportive care  Medically stable for acute admission, will follow up along with cardiology mike further recommendations        Margueritte Holter, MD    Thank you PRASHANT Bryant - CHRISTIAN for the opportunity to be involved in this patient's care. If you have any questions or concerns please feel free to contact me.

## 2022-01-22 VITALS
OXYGEN SATURATION: 99 % | HEART RATE: 81 BPM | DIASTOLIC BLOOD PRESSURE: 86 MMHG | BODY MASS INDEX: 44.15 KG/M2 | TEMPERATURE: 97.5 F | WEIGHT: 219 LBS | HEIGHT: 59 IN | SYSTOLIC BLOOD PRESSURE: 166 MMHG | RESPIRATION RATE: 18 BRPM

## 2022-01-22 LAB
ANION GAP SERPL CALCULATED.3IONS-SCNC: 12 MEQ/L (ref 9–15)
BUN BLDV-MCNC: 12 MG/DL (ref 8–23)
CALCIUM SERPL-MCNC: 9.1 MG/DL (ref 8.5–9.9)
CHLORIDE BLD-SCNC: 100 MEQ/L (ref 95–107)
CO2: 26 MEQ/L (ref 20–31)
CREAT SERPL-MCNC: 0.5 MG/DL (ref 0.5–0.9)
GFR AFRICAN AMERICAN: >60
GFR NON-AFRICAN AMERICAN: >60
GLUCOSE BLD-MCNC: 202 MG/DL (ref 70–99)
GLUCOSE BLD-MCNC: 203 MG/DL (ref 70–99)
HCT VFR BLD CALC: 42.4 % (ref 37–47)
HEMOGLOBIN: 14.1 G/DL (ref 11.2–15.7)
MCH RBC QN AUTO: 29.6 PG (ref 25.6–32.2)
MCHC RBC AUTO-ENTMCNC: 33.3 % (ref 32.2–35.5)
MCV RBC AUTO: 88.9 FL (ref 79.4–94.8)
PDW BLD-RTO: 13.8 % (ref 11.7–14.4)
PERFORMED ON: ABNORMAL
PLATELET # BLD: 272 K/UL (ref 182–369)
POTASSIUM REFLEX MAGNESIUM: 3.7 MEQ/L (ref 3.4–4.9)
RBC # BLD: 4.77 M/UL (ref 3.93–5.22)
SODIUM BLD-SCNC: 138 MEQ/L (ref 135–144)
WBC # BLD: 6.6 K/UL (ref 4–10)

## 2022-01-22 PROCEDURE — G0378 HOSPITAL OBSERVATION PER HR: HCPCS

## 2022-01-22 PROCEDURE — 80048 BASIC METABOLIC PNL TOTAL CA: CPT

## 2022-01-22 PROCEDURE — 6370000000 HC RX 637 (ALT 250 FOR IP): Performed by: INTERNAL MEDICINE

## 2022-01-22 PROCEDURE — 36415 COLL VENOUS BLD VENIPUNCTURE: CPT

## 2022-01-22 PROCEDURE — 85027 COMPLETE CBC AUTOMATED: CPT

## 2022-01-22 RX ORDER — ATORVASTATIN CALCIUM 20 MG/1
20 TABLET, FILM COATED ORAL NIGHTLY
Qty: 30 TABLET | Refills: 3 | Status: SHIPPED | OUTPATIENT
Start: 2022-01-22 | End: 2022-04-26 | Stop reason: SDUPTHER

## 2022-01-22 RX ORDER — DILTIAZEM HYDROCHLORIDE 120 MG/1
120 CAPSULE, COATED, EXTENDED RELEASE ORAL DAILY
Qty: 30 CAPSULE | Refills: 3 | Status: SHIPPED | OUTPATIENT
Start: 2022-01-23 | End: 2022-04-26 | Stop reason: SDUPTHER

## 2022-01-22 RX ADMIN — LOSARTAN POTASSIUM 25 MG: 25 TABLET, FILM COATED ORAL at 07:45

## 2022-01-22 RX ADMIN — ASPIRIN 81 MG CHEWABLE TABLET 81 MG: 81 TABLET CHEWABLE at 07:45

## 2022-01-22 RX ADMIN — HYDROCHLOROTHIAZIDE 25 MG: 25 TABLET ORAL at 07:45

## 2022-01-22 RX ADMIN — METOPROLOL TARTRATE 100 MG: 50 TABLET, FILM COATED ORAL at 07:45

## 2022-01-22 RX ADMIN — DILTIAZEM HYDROCHLORIDE 120 MG: 120 CAPSULE, EXTENDED RELEASE ORAL at 07:45

## 2022-01-22 RX ADMIN — GLIPIZIDE 2.5 MG: 5 TABLET ORAL at 06:43

## 2022-01-22 ASSESSMENT — PAIN SCALES - GENERAL: PAINLEVEL_OUTOF10: 0

## 2022-01-22 NOTE — PROGRESS NOTES
Discharge instructions reviewed, patient aware of Leopoldo Law from [facility] med will call her for nuc stress appointment. Reviewed medications and side effects regarding new meds: lipitor and Cardizem. IV removed, tele was taken off prior. Pt will call when ready to be discharged. She has car here and will drive herself home.  Electronically signed by Bulmaro Hill RN on 1/22/2022 at 10:00 AM

## 2022-01-22 NOTE — PROGRESS NOTES
Patient in bed just waking up, states she slept well. She had a brief few seconds of ST, which she did not feel while she was asleep. She did not have her Cpap on at the time she was asleep and states this is likely why her heart rate was elevated.  Electronically signed by Nioklai Ortez RN on 1/22/2022 at 8:41 AM

## 2022-01-22 NOTE — DISCHARGE SUMMARY
nightly     MULTIVITAMIN & MINERAL PO     Sphygmomanometer Misc  1 Device by Does not apply route daily     Ventolin  (90 Base) MCG/ACT inhaler  Generic drug: albuterol sulfate HFA  INHALE 2 PUFFS EVERY 4 HOURS AS NEEDED           Where to Get Your Medications      These medications were sent to [facility]  [address], [address] [place] [address]    Phone: [phone]   · atorvastatin 20 MG tablet  · dilTIAZem 120 MG extended release capsule         Physical Exam:    Vitals:  Vitals:    01/21/22 2043 01/22/22 0018 01/22/22 0110 01/22/22 0607   BP: 122/72 (!) 152/67  (!) 166/86   Pulse: 84 80  81   Resp: 18 18  18   Temp: 98.4 °F (36.9 °C)   97.5 °F (36.4 °C)   TempSrc: Oral   Oral   SpO2: 97% 99% 97% 99%   Weight:       Height:         Weight: Weight: 219 lb (99.3 kg)     24 hour intake/output:    Intake/Output Summary (Last 24 hours) at 1/22/2022 5359  Last data filed at 1/22/2022 5883  Gross per 24 hour   Intake 130 ml   Output --   Net 130 ml       General appearance - alert, well appearing, and in no distress  Chest - clear to auscultation, no wheezes, rales or rhonchi, symmetric air entry  Heart - normal rate, regular rhythm, normal S1, S2, no murmurs, rubs, clicks or gallops  Abdomen - soft, nontender, nondistended, no masses or organomegaly  Obese: Yes; Protuberant: No   Neurological - alert, oriented, normal speech, no focal findings or movement disorder noted  Extremities - peripheral pulses normal, no pedal edema, no clubbing or cyanosis  Skin - normal coloration and turgor, no rashes, no suspicious skin lesions noted        Radiology reports as per the Radiologist  Radiology: ECHO Complete 2D W Doppler W Color    Result Date: 1/21/2022  Transthoracic Echocardiography Report (TTE)  Demographics   Patient Name    Cyndy Cash  Gender               Female                  J   Patient Number  [id number]         Race                                   Ethnicity   Visit Number    [id number]      Room Number          2752   Corporate ID                   Date of Study        01/21/2022   Accession       [id number]     Referring Physician  Howie Thompson MD  Number   Date of Birth   1955     Sonographer          Liseth Woods ANA MARIA   Age             77 year(s)     Interpreting         [facility])                                 Physician            Cardiology                                                      Brittny Senior MD  Procedure Type of Study   TTE procedure:ECHO COMPLETE 2D W/DOP W/COLOR. Procedure Date Date: 01/21/2022 Start: 03:17 PM Study Location: [place] Technical Quality: Adequate visualization Indications:Abnormal ECG and Chest pain. Patient Status: Routine Height: 59 inches Weight: 219 pounds BSA: 1.92 m^2 BMI: 44.23 kg/m^2 BP: 147/90 mmHg  Conclusions   Summary  Mitral annular calcification is present. 1+ MR  Severely dilated left atrium. Normal left ventricle structure and function. Left ventricular ejection fraction is visually estimated at 60%. Normal diastolic filling pattern. Signature   ----------------------------------------------------------------  Electronically signed by Brittny Senior MD(Interpreting  physician) on 01/21/2022 04:09 PM  ----------------------------------------------------------------   Findings  Left Ventricle Normal left ventricle structure and function. Left ventricular ejection fraction is visually estimated at 60%. Normal diastolic filling pattern. Right Ventricle Normal right ventricle structure and function. Normal right ventricle systolic pressure. Left Atrium Severely dilated left atrium. Right Atrium Mildly enlarged right atrium size. Mitral Valve Mitral annular calcification is present. 1+ MR Tricuspid Valve Normal tricuspid valve structure and function. Trace TR Aortic Valve Aortic valve leaflets are mildly thickened.  Pulmonic Valve Normal pulmonic valve structure and function. Pericardial Effusion No evidence of pericardial effusion. Pleural Effusion No evidence of pleural effusion. Aorta \ Miscellaneous The aorta is within normal limits. M-Mode Measurements (cm)   LVIDd: 3.9 cm  IVSd: 1.57 cm                          AO Root Dimension: 2.67 cm  LVPWd: 1.4 cm                          ACS: 1.75 cm  Rt. Vent.  Dimension: 3.21 cm           LA: 3.4 cm                                         LVOT: 2.09 cm  Doppler Measurements:   AV Velocity:0.03 m/s                   MV Peak E-Wave: 1.04 m/s  AV Peak Gradient: 11.8 mmHg            MV Peak A-Wave: 0.93 m/s  AV Mean Gradient: 5.59 mmHg  AV Area (Continuity):2.91 cm^2  TR Velocity:2.74 m/s  TR Gradient:29.93 mmHg  Valves  Mitral Valve   Peak E-Wave: 1.04 m/s                 Peak A-Wave: 0.93 m/s                                        E/A Ratio: 1.12                                        Peak Gradient: 4.36 mmHg  MR Velocity: 3.08 m/s                 Deceleration Time: 210.4 msec   Tissue Doppler   E' Septal Velocity: 0.06 m/s  E' Lateral Velocity: 0.04 m/s   Aortic Valve   Peak Velocity: 1.72 m/s                Mean Velocity: 1.09 m/s  Peak Gradient: 11.8 mmHg               Mean Gradient: 5.59 mmHg  Area (continuity): 2.91 cm^2  AV VTI: 36.85 cm   Cusp Separation: 1.75 cm   Tricuspid Valve   TR Velocity: 2.74 m/s              TR Gradient: 29.93 mmHg   Pulmonic Valve   Peak Velocity: 0.89 m/s             Peak Gradient: 3.15 mmHg   LVOT   Peak Velocity: 1.45 m/s             Mean Velocity: 0.96 m/s  Peak Gradient: 8.4 mmHg             Mean Gradient: 4.17 mmHg  LVOT Diameter: 2.09 cm              LVOT VTI: 31.26 cm  Structures  Left Atrium   LA Dimension: 3.4 cm              LA Area: 31.48 cm^2  LA/Aorta: 1.27   Left Ventricle   Diastolic Dimension: 3.9 cm  Septum Diastolic: 4.11 cm  PW Diastolic: 1.4 cm                  LV ESV/LV ESV Index: 9.92 ml/5 m^2                                        LV Length: 5.04 cm  LV EDV/LV EDV Index: 65.75 ml/34 m^2  EF Calculated: 84.9 %   LVOT Diameter: 2.09 cm   Right Ventricle   Diastolic Dimension: 2.90 cm  Aorta/ Miscellaneous Aorta   Aortic Root: 2.67 cm  LVOT Diameter: 2.09 cm      CTA Chest W WO  (PE study)    Result Date: 1/21/2022  CT PULMONARY ANGIOGRAM WITH INTRAVENOUS CONTRAST MEDIUM. REASON FOR EXAMINATION:  CHEST PAIN TECHNIQUE: Helical CTA was performed through the chest utilizing 100 ml of Isovue-370 intravenous contrast.  Images were obtained with bolus tracking in order to opacify the pulmonary arteries. Thick section coronal MIP 3D reconstructions were performed  on a separate workstation. COMPARISON: CT chest, September 12, 2019 FINDINGS:  Pulmonary arteries: No intraluminal filling defects. Thoracic aorta: Normal in course and caliber. Cardiac: Size enlarged. Mitral annular calcification. Pericardial effusion: None. Right lung: No nodules, masses, pleural effusion, pneumothorax. Minimal dependent subsegmental atelectatic change. Left lung: No nodules, masses, pleural effusion, pneumothorax. Minimal dependent subsegmental atelectatic change. Lymph nodes: No hilar, mediastinal, or axillary lymph node enlargement. Upper abdomen:Limited imaging upper abdomen shows no gross anomaly. Musculoskeletal:No osteoblastic, and no osteolytic lesions. No fracture. No CT evidence pulmonary embolism. Cardiomegaly. All CT scans at this facility use dose modulation, iterative reconstruction, and/or weight based dosing when appropriate to reduce radiation dose to as low as reasonably achievable. XR CHEST PORTABLE    Result Date: 1/21/2022  Exam: XR CHEST PORTABLE History: Chest pain Technique: AP portable view of the chest obtained. Comparison: Chest x-rays December 21, 2021 Findings: The cardiomediastinal silhouette is within normal limits. No pneumothorax, pleural effusion, or consolidation. Bones of the thorax appear intact. No radiographic evidence of acute intrathoracic process. US DUP LOWER EXTREMITIES BILATERAL VENOUS    Result Date: 1/21/2022  EXAMINATION: US DUP LOWER EXTREMITIES BILATERAL VENOUS CLINICAL HISTORY: ELEVATED D-DIMER FINDINGS: Compression, augmentation, color flow identified, bilateral common femoral veins, proximal, mid, and distal superficial femoral veins, popliteal veins. Compression color flow visualized within bilateral popliteal venous vasculature. NO SONOGRAPHIC EVIDENCE, DEEP VEIN THROMBOSIS, BILATERAL LOWER EXTREMITIES.        Results for orders placed or performed during the hospital encounter of 01/21/22   COVID-19, Rapid    Specimen: Nasopharyngeal Swab   Result Value Ref Range    SARS-CoV-2, NAAT Not Detected Not Detected   Basic Metabolic Panel   Result Value Ref Range    Sodium 135 135 - 144 mEq/L    Potassium 3.3 (L) 3.4 - 4.9 mEq/L    Chloride 96 95 - 107 mEq/L    CO2 24 20 - 31 mEq/L    Anion Gap 15 9 - 15 mEq/L    Glucose 250 (H) 70 - 99 mg/dL    BUN 12 8 - 23 mg/dL    CREATININE 0.63 0.50 - 0.90 mg/dL    GFR Non-African American >60.0 >60    GFR  >60.0 >60    Calcium 9.4 8.5 - 9.9 mg/dL   CBC Auto Differential   Result Value Ref Range    WBC 12.5 (H) 4.0 - 10.0 K/uL    RBC 5.30 (H) 3.93 - 5.22 M/uL    Hemoglobin 15.6 11.2 - 15.7 g/dL    Hematocrit 46.2 37.0 - 47.0 %    MCV 87.2 79.4 - 94.8 fL    MCH 29.4 25.6 - 32.2 pg    MCHC 33.8 32.2 - 35.5 %    RDW 13.5 11.7 - 14.4 %    Platelets 160 021 - 063 K/uL    Neutrophils % 70.9 34.0 - 71.1 %    Immature Granulocytes % 0.3 %    Lymphocytes % 19.7 %    Monocytes % 8.1 4.7 - 12.5 %    Eosinophils % 0.8 0.7 - 5.8 %    Basophils % 0.2 0.1 - 1.2 %    Neutrophils Absolute 8.8 (H) 1.6 - 6.1 K/uL    Immature Granulocytes # 0.0 K/uL    Lymphocytes Absolute 2.5 1.2 - 3.7 K/uL    Monocytes Absolute 1.0 (H) 0.2 - 0.9 K/uL    Eosinophils Absolute 0.1 0.0 - 0.4 K/uL    Basophils Absolute 0.0 0.0 - 0.1 K/uL   Troponin   Result Value Ref Range    Troponin <0.010 0.000 - 0.010 ng/mL   D-Dimer, Quantitative   Result Value Ref Range    D-Dimer, Quant 0.54 (HH) 0.00 - 0.50 mg/L FEU   Troponin   Result Value Ref Range    Troponin <0.010 0.000 - 0.010 ng/mL   Hemoglobin A1c   Result Value Ref Range    Hemoglobin A1C 7.9 (H) 4.8 - 5.9 %   Troponin   Result Value Ref Range    Troponin <0.010 0.000 - 0.010 ng/mL   Hepatic Function Panel   Result Value Ref Range    Total Protein 6.8 6.3 - 8.0 g/dL    Albumin 3.6 3.5 - 4.6 g/dL    Alkaline Phosphatase 89 40 - 130 U/L    ALT 15 0 - 33 U/L    AST 14 0 - 35 U/L    Total Bilirubin 0.6 0.2 - 0.7 mg/dL    Bilirubin, Direct <0.2 0.0 - 0.4 mg/dL    Bilirubin, Indirect see below 0.0 - 0.6 mg/dL   Brain Natriuretic Peptide   Result Value Ref Range    Pro- pg/mL   Magnesium   Result Value Ref Range    Magnesium 2.1 1.7 - 2.4 mg/dL   TSH without Reflex   Result Value Ref Range    TSH 2.060 0.440 - 3.860 uIU/mL   Basic Metabolic Panel w/ Reflex to MG   Result Value Ref Range    Sodium 138 135 - 144 mEq/L    Potassium reflex Magnesium 3.7 3.4 - 4.9 mEq/L    Chloride 100 95 - 107 mEq/L    CO2 26 20 - 31 mEq/L    Anion Gap 12 9 - 15 mEq/L    Glucose 202 (H) 70 - 99 mg/dL    BUN 12 8 - 23 mg/dL    CREATININE 0.50 0.50 - 0.90 mg/dL    GFR Non-African American >60.0 >60    GFR  >60.0 >60    Calcium 9.1 8.5 - 9.9 mg/dL   CBC   Result Value Ref Range    WBC 6.6 4.0 - 10.0 K/uL    RBC 4.77 3.93 - 5.22 M/uL    Hemoglobin 14.1 11.2 - 15.7 g/dL    Hematocrit 42.4 37.0 - 47.0 %    MCV 88.9 79.4 - 94.8 fL    MCH 29.6 25.6 - 32.2 pg    MCHC 33.3 32.2 - 35.5 %    RDW 13.8 11.7 - 14.4 %    Platelets 387 451 - 552 K/uL   POCT Glucose   Result Value Ref Range    POC Glucose 199 (H) 70 - 99 mg/dl    Performed on ACCU-CHEK    POCT Glucose   Result Value Ref Range    POC Glucose 184 (H) 70 - 99 mg/dl    Performed on ACCU-CHEK    POCT Glucose   Result Value Ref Range    POC Glucose 200 (H) 70 - 99 mg/dl    Performed on ACCU-CHEK    POCT Glucose   Result Value Ref Range    POC Glucose 149 (H) 70 - 99 mg/dl    Performed on ACCU-CHEK    POCT Glucose   Result Value Ref Range    POC Glucose 203 (H) 70 - 99 mg/dl    Performed on ACCU-CHEK    EKG 12 Lead   Result Value Ref Range    Ventricular Rate 125 BPM    Atrial Rate 125 BPM    P-R Interval 138 ms    QRS Duration 78 ms    Q-T Interval 312 ms    QTc Calculation (Bazett) 450 ms    P Axis 50 degrees    R Axis 13 degrees    T Axis 43 degrees   EKG 12 Lead   Result Value Ref Range    Ventricular Rate 90 BPM    Atrial Rate 90 BPM    P-R Interval 136 ms    QRS Duration 84 ms    Q-T Interval 390 ms    QTc Calculation (Bazett) 477 ms    P Axis 60 degrees    R Axis 37 degrees    T Axis 29 degrees       Diet:  ADULT DIET;  Regular; Low Fat/Low Chol/High Fiber/ARYAN    Activity:  Activity as tolerated (Patient may move about with assist as indicated or with supervision.)    Follow-up:  in 1 weeks with PRASHANT Mcdowell CNP,      Disposition: home    Condition: Stable    Time Spent: 60 minutes    Electronically signed by Yady Zuñiga MD on 1/22/2022 at 8:38 AM    Discharging Hospitalist

## 2022-01-24 ENCOUNTER — TELEPHONE (OUTPATIENT)
Dept: FAMILY MEDICINE CLINIC | Age: 67
End: 2022-01-24

## 2022-01-24 NOTE — TELEPHONE ENCOUNTER
[facility] Transitions Initial Follow Up Call    Outreach made within 2 business days of discharge: Yes    Patient: Burnard Spatz Patient : 1955   MRN: [id number]  Reason for Admission: There are no discharge diagnoses documented for the most recent discharge. Discharge Date: 22       Spoke with: pt    Discharge department/facility: med surg      TCM Interactive Patient Contact:  Was patient able to fill all prescriptions: Yes  Was patient instructed to bring all medications to the follow-up visit: Yes  Is patient taking all medications as directed in the discharge summary?  Yes  Does patient understand their discharge instructions: Yes  Does patient have questions or concerns that need addressed prior to 7-14 day follow up office visit: no    Scheduled appointment with PCP within 7-14 days  Scheduled pt with Katina Grande on the   Follow Up  Future Appointments   Date Time Provider Lori Mckee   2022  9:30 AM Lianet Sanchez, APRN - [id number] [facility]   2022  8:30 AM Lianet Sanchez, APRN - CNP [facility]   2022  9:45 AM Yao Barahona MD [facility] EMERGENCY MEDICAL [place] AT [place], [place]

## 2022-01-28 ENCOUNTER — OFFICE VISIT (OUTPATIENT)
Dept: FAMILY MEDICINE CLINIC | Age: 67
End: 2022-01-28
Payer: MEDICARE

## 2022-01-28 VITALS
DIASTOLIC BLOOD PRESSURE: 82 MMHG | BODY MASS INDEX: 44.8 KG/M2 | WEIGHT: 221.8 LBS | OXYGEN SATURATION: 99 % | HEART RATE: 67 BPM | SYSTOLIC BLOOD PRESSURE: 136 MMHG | TEMPERATURE: 97.4 F

## 2022-01-28 DIAGNOSIS — R07.9 CHEST PAIN, UNSPECIFIED TYPE: ICD-10-CM

## 2022-01-28 DIAGNOSIS — R00.0 TACHYCARDIA: Primary | ICD-10-CM

## 2022-01-28 DIAGNOSIS — E66.01 OBESITY, CLASS III, BMI 40-49.9 (MORBID OBESITY) (HCC): ICD-10-CM

## 2022-01-28 PROCEDURE — 1123F ACP DISCUSS/DSCN MKR DOCD: CPT | Performed by: FAMILY MEDICINE

## 2022-01-28 PROCEDURE — G8427 DOCREV CUR MEDS BY ELIG CLIN: HCPCS | Performed by: FAMILY MEDICINE

## 2022-01-28 PROCEDURE — G8399 PT W/DXA RESULTS DOCUMENT: HCPCS | Performed by: FAMILY MEDICINE

## 2022-01-28 PROCEDURE — 1111F DSCHRG MED/CURRENT MED MERGE: CPT | Performed by: FAMILY MEDICINE

## 2022-01-28 PROCEDURE — 99496 TRANSJ CARE MGMT HIGH F2F 7D: CPT | Performed by: FAMILY MEDICINE

## 2022-01-28 PROCEDURE — 1090F PRES/ABSN URINE INCON ASSESS: CPT | Performed by: FAMILY MEDICINE

## 2022-01-28 PROCEDURE — G8417 CALC BMI ABV UP PARAM F/U: HCPCS | Performed by: FAMILY MEDICINE

## 2022-01-28 PROCEDURE — G8484 FLU IMMUNIZE NO ADMIN: HCPCS | Performed by: FAMILY MEDICINE

## 2022-01-28 PROCEDURE — 3017F COLORECTAL CA SCREEN DOC REV: CPT | Performed by: FAMILY MEDICINE

## 2022-01-28 PROCEDURE — 1036F TOBACCO NON-USER: CPT | Performed by: FAMILY MEDICINE

## 2022-01-28 PROCEDURE — 4040F PNEUMOC VAC/ADMIN/RCVD: CPT | Performed by: FAMILY MEDICINE

## 2022-01-28 NOTE — PROGRESS NOTES
Post-Discharge Transitional Care Management Services or Hospital Follow Up      Brandon De Anda   YOB: 1955    Date of Office Visit:  1/28/2022  Date of Hospital Admission: 1/21/22  Date of Hospital Discharge: 1/22/22  Readmission Risk Score(high >=14%. 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    LYNNE on CPAP    Presence of intraocular lens    Sprain of medial collateral ligament of left knee    Type 2 diabetes mellitus without complication ([address]    Asthma    Essential tremor    History of kidney stones    Maier's neuroma of both feet    Allergic rhinitis    Neuropathy    DDD (degenerative disc disease), lumbosacral    Low HDL (under 40)    Essential hypertension    Morbidly obese (HCC)    Chest pain       Allergies   Allergen Reactions    Lisinopril Other (See Comments)     Severe cough    Molds & Smuts     Salsalate Other (See Comments)     causes pt to lose hearing    Percocet [Oxycodone-Acetaminophen] Nausea And Vomiting       Medications listed as ordered at the time of discharge from hospital     Medication List          Accurate as of January 28, 2022 11:59 PM. If you have any questions, ask your nurse or doctor.             CONTINUE taking these medications    aspirin 81 MG tablet     atorvastatin 20 MG tablet  Commonly known as: LIPITOR  Take 1 tablet by mouth nightly     dilTIAZem 120 MG extended release capsule  Commonly known as: CARDIZEM CD  Take 1 capsule by mouth daily     Fish Oil 1200 MG Caps     fluticasone 110 MCG/ACT inhaler  Commonly known as: Flovent HFA  Inhale 2 puffs into the lungs 2 times daily     glimepiride 1 MG tablet  Commonly known as: AMARYL  TAKE 1 TABLET BY MOUTH  EVERY MORNING BEFORE  BREAKFAST AND EVERY EVENING BEFORE DINNER.      losartan 25 MG tablet  Commonly known as: COZAAR  Take 1 tablet by mouth daily     metFORMIN 850 MG tablet  Commonly known as: GLUCOPHAGE  TAKE 1 TABLET TWICE A DAY WITH MEALS (NEED APPOINTMENT FOR FURTHER REFILLS)     metoprolol-hydroCHLOROthiazide 100-25 MG per tablet  Commonly known as: LOPRESSOR HCT  Take 1 tablet by mouth daily     montelukast 10 MG tablet  Commonly known as: SINGULAIR  Take 1 tablet by mouth nightly     MULTIVITAMIN & MINERAL PO     Sphygmomanometer Misc  1 Device by Does not apply route daily     Ventolin  (90 Base) MCG/ACT inhaler  Generic drug: albuterol sulfate HFA  INHALE 2 PUFFS EVERY 4 HOURS AS NEEDED              Medications marked \"taking\" at this time  Outpatient Medications Marked as Taking for the 1/28/22 encounter (Office Visit) with Brooke Hugo MD   Medication Sig Dispense Refill    atorvastatin (LIPITOR) 20 MG tablet Take 1 tablet by mouth nightly 30 tablet 3    dilTIAZem (CARDIZEM CD) 120 MG extended release capsule Take 1 capsule by mouth daily 30 capsule 3    montelukast (SINGULAIR) 10 MG tablet Take 1 tablet by mouth nightly 90 tablet 2    metoprolol-hydroCHLOROthiazide (LOPRESSOR HCT) 100-25 MG per tablet Take 1 tablet by mouth daily 90 tablet 2    metFORMIN (GLUCOPHAGE) 850 MG tablet TAKE 1 TABLET TWICE A DAY WITH MEALS (NEED APPOINTMENT FOR FURTHER REFILLS) 180 tablet 3    losartan (COZAAR) 25 MG tablet Take 1 tablet by mouth daily 90 tablet 2    glimepiride (AMARYL) 1 MG tablet TAKE 1 TABLET BY MOUTH  EVERY MORNING BEFORE  BREAKFAST AND EVERY EVENING BEFORE DINNER. 180 tablet 2    VENTOLIN  (90 Base) MCG/ACT inhaler INHALE 2 PUFFS EVERY 4 HOURS AS NEEDED 1 each 3    fluticasone (FLOVENT HFA) 110 MCG/ACT inhaler Inhale 2 puffs into the lungs 2 times daily 1 each 2    Blood Pressure Monitoring (SPHYGMOMANOMETER) MISC 1 Device by Does not apply route daily 1 each 0    Omega-3 Fatty Acids (FISH OIL) 1200 MG CAPS Take by mouth      Multiple Vitamins-Minerals (MULTIVITAMIN & MINERAL PO) Take by mouth      aspirin 81 MG tablet Take 81 mg by mouth          Medications patient taking as of now reconciled against medications ordered at time of hospital discharge: Yes    Chief Complaint   Patient presents with   [place] ED Follow-up     states medication is working well, would like to kow if she can take advil, stopped using an inhailer due to high BPM, would like to make sure medications are safe to take together. would like to know if she can increase physical activity. HPI  Patient is a very pleasant 12-year-old female who presents today to follow-up after recent hospitalization for dyspnea, chest pain and palpitations. She had noticed that her heart rate was extremely elevated up to 145 bpm and decided to go to the emergency room. She was evaluated by cardiology with recommendation to have a stress test and echocardiogram as an outpatient. This is already been scheduled. She states that she is doing well and has a Fitbit that monitors her heart rate and states that average pulse has been in the 60s. She denies any chest pain, shortness of breath or lower extremity edema  Inpatient course: Discharge summary reviewed- see chart. Interval history/Current status: Improved    Review of Systems   Constitutional: Negative for activity change, appetite change and fatigue. Respiratory: Negative for apnea, cough, chest tightness and shortness of breath. Cardiovascular: Negative for chest pain, palpitations and leg swelling. Gastrointestinal: Negative for abdominal pain, blood in stool, constipation, diarrhea, nausea and vomiting. Musculoskeletal: Negative for arthralgias.    Neurological: Negative for seizures and headaches. Psychiatric/Behavioral: Negative for hallucinations and suicidal ideas. Vitals:    01/28/22 1108   BP: 136/82   Pulse: 67   Temp: 97.4 °F (36.3 °C)   TempSrc: Infrared   SpO2: 99%   Weight: 221 lb 12.8 oz (100.6 kg)     Body mass index is 44.8 kg/m². Wt Readings from Last 3 Encounters:   01/28/22 221 lb 12.8 oz (100.6 kg)   01/21/22 219 lb (99.3 kg)   12/21/21 224 lb (101.6 kg)     BP Readings from Last 3 Encounters:   01/28/22 136/82   01/22/22 (!) 166/86   12/21/21 (!) 184/88       Physical Exam  Vitals and nursing note reviewed. Constitutional:       General: She is not in acute distress. Appearance: Normal appearance. She is well-developed. She is obese. She is not diaphoretic. HENT:      Head: Normocephalic and atraumatic. Nose: Nose normal.      Mouth/Throat:      Mouth: Mucous membranes are moist.      Pharynx: Oropharynx is clear. Eyes:      Conjunctiva/sclera: Conjunctivae normal.      Pupils: Pupils are equal, round, and reactive to light. Cardiovascular:      Rate and Rhythm: Normal rate and regular rhythm. Heart sounds: Normal heart sounds. No murmur heard. No friction rub. No gallop. Pulmonary:      Effort: Pulmonary effort is normal. No respiratory distress. Breath sounds: Normal breath sounds. No wheezing or rales. Chest:      Chest wall: No tenderness. Abdominal:      General: Abdomen is flat. Bowel sounds are normal.      Palpations: Abdomen is soft. Tenderness: There is no abdominal tenderness. Musculoskeletal:      Cervical back: Normal range of motion and neck supple. Skin:     General: Skin is warm and dry. Neurological:      Mental Status: She is alert and oriented to person, place, and time. Psychiatric:         Behavior: Behavior normal.         Thought Content: Thought content normal.         Judgment: Judgment normal.             Assessment/Plan:  1.  Tachycardia  Follow-up with testing and cardiology as scheduled  - [place] DISCHARGE MEDS RECONCILED W/ CURRENT OUTPATIENT MED LIST  - Amb External Referral To Cardiology    2.  Chest pain, unspecified type  Follow-up with testing and cardiology as scheduled  - [place] DISCHARGE MEDS RECONCILED W/ CURRENT OUTPATIENT MED LIST    3. Obesity, Class III, BMI 40-49.9 (morbid obesity) ([facility] [address]  Patient is making a conscious effort for weight loss        Medical Decision Making: high complexity

## 2022-01-29 ASSESSMENT — ENCOUNTER SYMPTOMS
APNEA: 0
CHEST TIGHTNESS: 0
COUGH: 0
ABDOMINAL PAIN: 0
SHORTNESS OF BREATH: 0
VOMITING: 0
DIARRHEA: 0
CONSTIPATION: 0
NAUSEA: 0
BLOOD IN STOOL: 0

## 2022-01-31 ENCOUNTER — HOSPITAL ENCOUNTER (OUTPATIENT)
Dept: NON INVASIVE DIAGNOSTICS | Age: 67
Discharge: HOME | End: 2022-01-31
Payer: MEDICARE

## 2022-01-31 ENCOUNTER — HOSPITAL ENCOUNTER (OUTPATIENT)
Dept: NUCLEAR MEDICINE | Age: 67
Discharge: HOME | End: 2022-02-02
Payer: MEDICARE

## 2022-01-31 LAB
LV EF: 82 %
LVEF MODALITY: NORMAL

## 2022-01-31 PROCEDURE — 3430000000 HC RX DIAGNOSTIC RADIOPHARMACEUTICAL: Performed by: INTERNAL MEDICINE

## 2022-01-31 PROCEDURE — 93018 CV STRESS TEST I&R ONLY: CPT | Performed by: INTERNAL MEDICINE

## 2022-01-31 PROCEDURE — 93016 CV STRESS TEST SUPVJ ONLY: CPT | Performed by: INTERNAL MEDICINE

## 2022-01-31 PROCEDURE — 78452 HT MUSCLE IMAGE SPECT MULT: CPT

## 2022-01-31 PROCEDURE — 93017 CV STRESS TEST TRACING ONLY: CPT

## 2022-01-31 PROCEDURE — 78452 HT MUSCLE IMAGE SPECT MULT: CPT | Performed by: INTERNAL MEDICINE

## 2022-01-31 PROCEDURE — A9502 TC99M TETROFOSMIN: HCPCS | Performed by: INTERNAL MEDICINE

## 2022-01-31 PROCEDURE — 6360000002 HC RX W HCPCS: Performed by: INTERNAL MEDICINE

## 2022-01-31 PROCEDURE — 2580000003 HC RX 258: Performed by: INTERNAL MEDICINE

## 2022-01-31 RX ORDER — SODIUM CHLORIDE 0.9 % (FLUSH) 0.9 %
10 SYRINGE (ML) INJECTION PRN
Status: DISCONTINUED | OUTPATIENT
Start: 2022-01-31 | End: 2022-02-03 | Stop reason: HOSPADM

## 2022-01-31 RX ADMIN — TETROFOSMIN 35.9 MILLICURIE: 1.38 INJECTION, POWDER, LYOPHILIZED, FOR SOLUTION INTRAVENOUS at 11:38

## 2022-01-31 RX ADMIN — Medication 10 ML: at 11:39

## 2022-01-31 RX ADMIN — REGADENOSON 0.4 MG: 0.08 INJECTION, SOLUTION INTRAVENOUS at 11:38

## 2022-01-31 RX ADMIN — Medication 10 ML: at 10:15

## 2022-01-31 RX ADMIN — TETROFOSMIN 11 MILLICURIE: 1.38 INJECTION, POWDER, LYOPHILIZED, FOR SOLUTION INTRAVENOUS at 10:16

## 2022-01-31 RX ADMIN — Medication 10 ML: at 11:38

## 2022-01-31 NOTE — PROGRESS NOTES
Reviewed history, allergies, and medications. Patient took all her home medications prior to testing. Consent confirmed. Lexiscan exam explained. Placed patient on monitor. @ 2131 Nuclear Medicine tech here to inject Santhosh Thompson. SOB noted during recovery phase. Denied chest pain. No ectopy noted. Patient off monitor and instructed to eat, will have last part of exam in 1 hour.

## 2022-02-15 ENCOUNTER — HOSPITAL ENCOUNTER (OUTPATIENT)
Dept: WOMENS IMAGING | Age: 67
Discharge: HOME | End: 2022-02-17
Payer: MEDICARE

## 2022-02-15 DIAGNOSIS — Z12.31 ENCOUNTER FOR SCREENING MAMMOGRAM FOR MALIGNANT NEOPLASM OF BREAST: ICD-10-CM

## 2022-02-15 PROCEDURE — 77063 BREAST TOMOSYNTHESIS BI: CPT

## 2022-02-23 ENCOUNTER — OFFICE VISIT (OUTPATIENT)
Dept: CARDIOLOGY CLINIC | Age: 67
End: 2022-02-23
Payer: MEDICARE

## 2022-02-23 VITALS
WEIGHT: 223 LBS | OXYGEN SATURATION: 96 % | HEIGHT: 59 IN | HEART RATE: 75 BPM | BODY MASS INDEX: 44.96 KG/M2 | SYSTOLIC BLOOD PRESSURE: 130 MMHG | DIASTOLIC BLOOD PRESSURE: 76 MMHG

## 2022-02-23 DIAGNOSIS — I10 ESSENTIAL HYPERTENSION: Primary | ICD-10-CM

## 2022-02-23 PROCEDURE — G8399 PT W/DXA RESULTS DOCUMENT: HCPCS | Performed by: INTERNAL MEDICINE

## 2022-02-23 PROCEDURE — 99213 OFFICE O/P EST LOW 20 MIN: CPT | Performed by: INTERNAL MEDICINE

## 2022-02-23 PROCEDURE — G8484 FLU IMMUNIZE NO ADMIN: HCPCS | Performed by: INTERNAL MEDICINE

## 2022-02-23 PROCEDURE — 1036F TOBACCO NON-USER: CPT | Performed by: INTERNAL MEDICINE

## 2022-02-23 PROCEDURE — G8427 DOCREV CUR MEDS BY ELIG CLIN: HCPCS | Performed by: INTERNAL MEDICINE

## 2022-02-23 PROCEDURE — 4040F PNEUMOC VAC/ADMIN/RCVD: CPT | Performed by: INTERNAL MEDICINE

## 2022-02-23 PROCEDURE — 1090F PRES/ABSN URINE INCON ASSESS: CPT | Performed by: INTERNAL MEDICINE

## 2022-02-23 PROCEDURE — G8417 CALC BMI ABV UP PARAM F/U: HCPCS | Performed by: INTERNAL MEDICINE

## 2022-02-23 PROCEDURE — 3017F COLORECTAL CA SCREEN DOC REV: CPT | Performed by: INTERNAL MEDICINE

## 2022-02-23 PROCEDURE — 1123F ACP DISCUSS/DSCN MKR DOCD: CPT | Performed by: INTERNAL MEDICINE

## 2022-02-23 RX ORDER — IPRATROPIUM BROMIDE 21 UG/1
SPRAY, METERED NASAL
COMMUNITY
Start: 2022-01-07 | End: 2022-06-30 | Stop reason: ALTCHOICE

## 2022-02-23 ASSESSMENT — ENCOUNTER SYMPTOMS
WHEEZING: 0
RHINORRHEA: 0
ABDOMINAL PAIN: 0
COUGH: 0
CONSTIPATION: 0
COLOR CHANGE: 0
CHEST TIGHTNESS: 0
VOMITING: 0
DIARRHEA: 0
NAUSEA: 0
SHORTNESS OF BREATH: 0
APNEA: 0
EYE REDNESS: 0

## 2022-02-23 NOTE — PROGRESS NOTES
(degenerative disc disease), lumbosacral    Low HDL (under 40)    Essential hypertension    Morbidly obese (HCC)    Chest pain       Past Surgical History:   Procedure Laterality Date    CARPAL TUNNEL RELEASE Bilateral 80's    carpal tunnel both wrists and right foot, POLLACK'S NEUROMA    EYE SURGERY Right 03/2017    had film removed on right eye    JOINT REPLACEMENT Left 09/22/2010    left thumb replacement    KNEE SURGERY Bilateral     torn meniscus 1/16/2009, 2/12/2009, 2/2016    KNEE SURGERY Left 02/2016    OVARIAN CYST REMOVAL  05/13/2008    ROTATOR CUFF REPAIR Left 01/08/2004    ROTATOR CUFF REPAIR Right 08/22/2005    TONSILLECTOMY AND ADENOIDECTOMY  1963       Social History     Socioeconomic History    Marital status: Single     Spouse name: None    Number of children: None    Years of education: None    Highest education level: None   Occupational History    None   Tobacco Use    Smoking status: Never Smoker    Smokeless tobacco: Never Used   Vaping Use    Vaping Use: Never used   Substance and Sexual Activity    Alcohol use: Yes     Comment: occassionally    Drug use: No    Sexual activity: Not Currently   Other Topics Concern    None   Social History Narrative    None     Social Determinants of Health     Financial Resource Strain:     Difficulty of Paying Living Expenses: Not on file   Food Insecurity:     Worried About Running Out of Food in the Last Year: Not on file    Annie of Food in the Last Year: Not on file   Transportation Needs:     Lack of Transportation (Medical): Not on file    Lack of Transportation (Non-Medical):  Not on file   Physical Activity:     Days of Exercise per Week: Not on file    Minutes of Exercise per Session: Not on file   Stress:     Feeling of Stress : Not on file   Social Connections:     Frequency of Communication with Friends and Family: Not on file    Frequency of Social Gatherings with Friends and Family: Not on file    Attends [employer] Services: Not on file    Active Member of Clubs or Organizations: Not on file    Attends Club or Organization Meetings: Not on file    Marital Status: Not on file   Intimate Partner Violence:     Fear of Current or Ex-Partner: Not on file    Emotionally Abused: Not on file    Physically Abused: Not on file    Sexually Abused: Not on file   Housing Stability:     Unable to Pay for Housing in the Last Year: Not on file    Number of [place] in the Last Year: Not on file    Unstable Housing in the Last Year: Not on file       Family History   Problem Relation Age of Onset    Lung Cancer Mother     Diabetes Mother     Hypertension Mother     Diabetes Father     Hypertension Father     Hypertension Brother     Diabetes Brother     Breast Cancer Maternal Aunt        Current Outpatient Medications   Medication Sig Dispense Refill    ipratropium (ATROVENT) 0.03 % nasal spray 2 sprays by Each Nostril route every 12 hours      atorvastatin (LIPITOR) 20 MG tablet Take 1 tablet by mouth nightly 30 tablet 3    dilTIAZem (CARDIZEM CD) 120 MG extended release capsule Take 1 capsule by mouth daily 30 capsule 3    montelukast (SINGULAIR) 10 MG tablet Take 1 tablet by mouth nightly 90 tablet 2    metoprolol-hydroCHLOROthiazide (LOPRESSOR HCT) 100-25 MG per tablet Take 1 tablet by mouth daily 90 tablet 2    metFORMIN (GLUCOPHAGE) 850 MG tablet TAKE 1 TABLET TWICE A DAY WITH MEALS (NEED APPOINTMENT FOR FURTHER REFILLS) 180 tablet 3    losartan (COZAAR) 25 MG tablet Take 1 tablet by mouth daily 90 tablet 2    glimepiride (AMARYL) 1 MG tablet TAKE 1 TABLET BY MOUTH  EVERY MORNING BEFORE  BREAKFAST AND EVERY EVENING BEFORE DINNER. 180 tablet 2    VENTOLIN  (90 Base) MCG/ACT inhaler INHALE 2 PUFFS EVERY 4 HOURS AS NEEDED 1 each 3    fluticasone (FLOVENT HFA) 110 MCG/ACT inhaler Inhale 2 puffs into the lungs 2 times daily 1 each 2    Blood Pressure Monitoring (SPHYGMOMANOMETER) MISC 1 Device by Does not apply route daily 1 each 0    Omega-3 Fatty Acids (FISH OIL) 1200 MG CAPS Take by mouth      Multiple Vitamins-Minerals (MULTIVITAMIN & MINERAL PO) Take by mouth      aspirin 81 MG tablet Take 81 mg by mouth       No current facility-administered medications for this visit. Lisinopril, Molds & smuts, Salsalate, and Percocet [oxycodone-acetaminophen]    Review of Systems:  Review of Systems   Constitutional: Negative for activity change, chills, diaphoresis and fever. HENT: Negative for congestion, ear pain, nosebleeds and rhinorrhea. Eyes: Negative for redness and visual disturbance. Respiratory: Negative for apnea, cough, chest tightness, shortness of breath and wheezing. Cardiovascular: Negative for chest pain, palpitations and leg swelling. Gastrointestinal: Negative for abdominal pain, constipation, diarrhea, nausea and vomiting. Genitourinary: Negative for difficulty urinating and dysuria. Musculoskeletal: Negative. Negative for joint swelling. Skin: Negative for color change, rash and wound. Neurological: Negative for dizziness, syncope, weakness, numbness and headaches. Psychiatric/Behavioral: Negative. VITALS:  Blood pressure 130/76, pulse 75, height 4' 11\" (1.499 m), weight 223 lb (101.2 kg), SpO2 96 %, not currently breastfeeding. Body mass index is 45.04 kg/m². Physical Examination:  Physical Exam  Vitals and nursing note reviewed. Constitutional:       Appearance: Normal appearance. She is obese. HENT:      Head: Normocephalic and atraumatic. Mouth/Throat:      Mouth: Mucous membranes are moist.      Pharynx: Oropharynx is clear. Eyes:      Extraocular Movements: Extraocular movements intact. Conjunctiva/sclera: Conjunctivae normal.      Pupils: Pupils are equal, round, and reactive to light. Cardiovascular:      Rate and Rhythm: Normal rate and regular rhythm. Pulses: Normal pulses. Heart sounds: Normal heart sounds. moderate-intensity physical activity or 75 minutes per week of vigorous-intensity physical activity as per 2019 ACC/AHA guideline on primary prevention of CVD           Continue medications at current doses.

## 2022-03-01 ENCOUNTER — HOSPITAL ENCOUNTER (OUTPATIENT)
Dept: NON INVASIVE DIAGNOSTICS | Age: 67
Discharge: HOME | End: 2022-03-01
Payer: MEDICARE

## 2022-03-01 DIAGNOSIS — I10 ESSENTIAL HYPERTENSION: ICD-10-CM

## 2022-03-01 PROCEDURE — 93225 XTRNL ECG REC<48 HRS REC: CPT

## 2022-03-01 PROCEDURE — 93226 XTRNL ECG REC<48 HR SCAN A/R: CPT

## 2022-03-04 LAB — DIABETIC RETINOPATHY: NEGATIVE

## 2022-03-09 NOTE — PROCEDURES
[address] [address]                                 HOLTER MONITOR    PATIENT NAME: Maribel Loja                    :        1955  MED REC NO:   [id number]                              ROOM:  ACCOUNT NO:   [de-identified]                           ADMIT DATE: 2022  PROVIDER:     Maribeth Kothari DO    HOLTER MONITOR 48-HOURS    DATE OF STUDY:  2022    REASON FOR EXAM:  Irregular heart rate. DESCRIPTION OF PROCEDURE:  The patient was monitored for 48 hours. The  average heart rate was 69, minimum heart rate of 51 and maximum heart  rate of 115 beats per minute. Baseline rhythm strip reviewed showing  normal sinus rhythm with rare PACs and PVCs. There is 4-beat run of  nonsustained VT. There is very short atrial runs. IMPRESSION:  1. Baseline normal sinus rhythm. 2.  No evidence of any malignant tachy or bradyarrhythmias or any  conduction abnormalities. 3.  4-beat run nonsustained VT. 4.  Rare PACs and PVCs. 5.  No recorded symptoms.         Sherry Lara DO    D: 2022 7:42:05       T: 2022 9:42:54     [email or web address]  Job#: [id number]     Doc#: [id number]    CC:

## 2022-04-19 ENCOUNTER — TELEPHONE (OUTPATIENT)
Dept: CARDIOLOGY CLINIC | Age: 67
End: 2022-04-19

## 2022-04-19 NOTE — TELEPHONE ENCOUNTER
Patient is not please the appointment was cancelled 2 days in a row. Please call with the test results.

## 2022-04-21 ENCOUNTER — TELEPHONE (OUTPATIENT)
Dept: CARDIOLOGY CLINIC | Age: 67
End: 2022-04-21

## 2022-04-25 SDOH — HEALTH STABILITY: PHYSICAL HEALTH: ON AVERAGE, HOW MANY MINUTES DO YOU ENGAGE IN EXERCISE AT THIS LEVEL?: PATIENT DECLINED

## 2022-04-25 SDOH — HEALTH STABILITY: PHYSICAL HEALTH: ON AVERAGE, HOW MANY DAYS PER WEEK DO YOU ENGAGE IN MODERATE TO STRENUOUS EXERCISE (LIKE A BRISK WALK)?: 5 DAYS

## 2022-04-25 ASSESSMENT — LIFESTYLE VARIABLES
HOW MANY STANDARD DRINKS CONTAINING ALCOHOL DO YOU HAVE ON A TYPICAL DAY: 1
HOW MANY STANDARD DRINKS CONTAINING ALCOHOL DO YOU HAVE ON A TYPICAL DAY: 1 OR 2
HOW OFTEN DO YOU HAVE SIX OR MORE DRINKS ON ONE OCCASION: 1
HOW OFTEN DO YOU HAVE A DRINK CONTAINING ALCOHOL: MONTHLY OR LESS
HOW OFTEN DO YOU HAVE A DRINK CONTAINING ALCOHOL: 2

## 2022-04-25 ASSESSMENT — PATIENT HEALTH QUESTIONNAIRE - PHQ9
1. LITTLE INTEREST OR PLEASURE IN DOING THINGS: 0
SUM OF ALL RESPONSES TO PHQ9 QUESTIONS 1 & 2: 1
SUM OF ALL RESPONSES TO PHQ QUESTIONS 1-9: 1
2. FEELING DOWN, DEPRESSED OR HOPELESS: 1
SUM OF ALL RESPONSES TO PHQ QUESTIONS 1-9: 1

## 2022-04-25 NOTE — TELEPHONE ENCOUNTER
Please asked patient to come to see me next available appointment to discuss findings of Holter monitor and further plan  Nothing too concerning Holter monitor  Thanks

## 2022-04-26 ENCOUNTER — OFFICE VISIT (OUTPATIENT)
Dept: FAMILY MEDICINE CLINIC | Age: 67
End: 2022-04-26
Payer: MEDICARE

## 2022-04-26 VITALS
RESPIRATION RATE: 18 BRPM | OXYGEN SATURATION: 97 % | TEMPERATURE: 96.5 F | HEART RATE: 63 BPM | DIASTOLIC BLOOD PRESSURE: 82 MMHG | WEIGHT: 219.6 LBS | HEIGHT: 60 IN | SYSTOLIC BLOOD PRESSURE: 136 MMHG | BODY MASS INDEX: 43.11 KG/M2

## 2022-04-26 DIAGNOSIS — R00.0 TACHYCARDIA: ICD-10-CM

## 2022-04-26 DIAGNOSIS — E11.9 TYPE 2 DIABETES MELLITUS WITHOUT COMPLICATION, WITHOUT LONG-TERM CURRENT USE OF INSULIN (HCC): ICD-10-CM

## 2022-04-26 DIAGNOSIS — G47.09 OTHER INSOMNIA: ICD-10-CM

## 2022-04-26 DIAGNOSIS — G89.29 CHRONIC RIGHT-SIDED LOW BACK PAIN WITH RIGHT-SIDED SCIATICA: ICD-10-CM

## 2022-04-26 DIAGNOSIS — M54.41 CHRONIC RIGHT-SIDED LOW BACK PAIN WITH RIGHT-SIDED SCIATICA: ICD-10-CM

## 2022-04-26 DIAGNOSIS — Z99.89 OSA ON CPAP: ICD-10-CM

## 2022-04-26 DIAGNOSIS — G47.33 OSA ON CPAP: ICD-10-CM

## 2022-04-26 DIAGNOSIS — G25.0 ESSENTIAL TREMOR: ICD-10-CM

## 2022-04-26 DIAGNOSIS — E78.5 DYSLIPIDEMIA: ICD-10-CM

## 2022-04-26 DIAGNOSIS — Z00.00 MEDICARE ANNUAL WELLNESS VISIT, INITIAL: Primary | ICD-10-CM

## 2022-04-26 DIAGNOSIS — Z12.11 COLON CANCER SCREENING: ICD-10-CM

## 2022-04-26 PROCEDURE — G0438 PPPS, INITIAL VISIT: HCPCS | Performed by: NURSE PRACTITIONER

## 2022-04-26 PROCEDURE — 3017F COLORECTAL CA SCREEN DOC REV: CPT | Performed by: NURSE PRACTITIONER

## 2022-04-26 PROCEDURE — 3051F HG A1C>EQUAL 7.0%<8.0%: CPT | Performed by: NURSE PRACTITIONER

## 2022-04-26 PROCEDURE — 4040F PNEUMOC VAC/ADMIN/RCVD: CPT | Performed by: NURSE PRACTITIONER

## 2022-04-26 PROCEDURE — 1123F ACP DISCUSS/DSCN MKR DOCD: CPT | Performed by: NURSE PRACTITIONER

## 2022-04-26 RX ORDER — ATORVASTATIN CALCIUM 20 MG/1
20 TABLET, FILM COATED ORAL NIGHTLY
Qty: 90 TABLET | Refills: 1 | Status: SHIPPED | OUTPATIENT
Start: 2022-04-26 | End: 2022-09-13 | Stop reason: SDUPTHER

## 2022-04-26 RX ORDER — DILTIAZEM HYDROCHLORIDE 120 MG/1
120 CAPSULE, COATED, EXTENDED RELEASE ORAL DAILY
Qty: 90 CAPSULE | Refills: 1 | Status: SHIPPED | OUTPATIENT
Start: 2022-04-26 | End: 2022-09-13 | Stop reason: SDUPTHER

## 2022-04-26 SDOH — ECONOMIC STABILITY: FOOD INSECURITY: WITHIN THE PAST 12 MONTHS, YOU WORRIED THAT YOUR FOOD WOULD RUN OUT BEFORE YOU GOT MONEY TO BUY MORE.: NEVER TRUE

## 2022-04-26 SDOH — ECONOMIC STABILITY: FOOD INSECURITY: WITHIN THE PAST 12 MONTHS, THE FOOD YOU BOUGHT JUST DIDN'T LAST AND YOU DIDN'T HAVE MONEY TO GET MORE.: NEVER TRUE

## 2022-04-26 ASSESSMENT — SOCIAL DETERMINANTS OF HEALTH (SDOH): HOW HARD IS IT FOR YOU TO PAY FOR THE VERY BASICS LIKE FOOD, HOUSING, MEDICAL CARE, AND HEATING?: NOT HARD AT ALL

## 2022-04-26 NOTE — PATIENT INSTRUCTIONS
Personalized Preventive Plan for Naila Chase - 4/26/2022  Medicare offers a range of preventive health benefits. Some of the tests and screenings are paid in full while other may be subject to a deductible, co-insurance, and/or copay. Some of these benefits include a comprehensive review of your medical history including lifestyle, illnesses that may run in your family, and various assessments and screenings as appropriate. After reviewing your medical record and screening and assessments performed today your provider may have ordered immunizations, labs, imaging, and/or referrals for you. A list of these orders (if applicable) as well as your Preventive Care list are included within your After Visit Summary for your review. Other Preventive Recommendations:    · A preventive eye exam performed by an eye specialist is recommended every 1-2 years to screen for glaucoma; cataracts, macular degeneration, and other eye disorders. · A preventive dental visit is recommended every 6 months. · Try to get at least 150 minutes of exercise per week or 10,000 steps per day on a pedometer . · Order or download the FREE \"Exercise & Physical Activity: Your Everyday Guide\" from [employer]. Call 6-282.589.6682 or search [employer] online. · You need 2220-4241 mg of calcium and 4590-6401 IU of vitamin D per day. It is possible to meet your calcium requirement with diet alone, but a vitamin D supplement is usually necessary to meet this goal.  · When exposed to the sun, use a sunscreen that protects against both UVA and UVB radiation with an SPF of 30 or greater. Reapply every 2 to 3 hours or after sweating, drying off with a towel, or swimming. · Always wear a seat belt when traveling in a car. Always wear a helmet when riding a bicycle or motorcycle.

## 2022-04-26 NOTE — PROGRESS NOTES
Medicare Annual Wellness Visit    Fabiano Oneal is here for Medicare AWV    Assessment & Plan   Medicare annual wellness visit, initial  Type 2 diabetes mellitus without complication, without long-term current use of insulin (HCC)  Other insomnia  Essential tremor  Chronic right-sided low back pain with right-sided sciatica  -     Coenzyme Q10 (CO Q 10) 100 MG CAPS; Take 1 capsule by mouth daily, Disp-30 capsule, R-11NO PRINT  LYNNE on CPAP  Tachycardia  -     atorvastatin (LIPITOR) 20 MG tablet; Take 1 tablet by mouth nightly, Disp-90 tablet, R-1Normal  -     dilTIAZem (CARDIZEM CD) 120 MG extended release capsule; Take 1 capsule by mouth daily, Disp-90 capsule, R-1Normal  Dyslipidemia  -     atorvastatin (LIPITOR) 20 MG tablet; Take 1 tablet by mouth nightly, Disp-90 tablet, R-1Normal  -     dilTIAZem (CARDIZEM CD) 120 MG extended release capsule; Take 1 capsule by mouth daily, Disp-90 capsule, R-1Normal      Recommendations for Preventive Services Due: see orders and patient instructions/AVS.  Recommended screening schedule for the next 5-10 years is provided to the patient in written form: see Patient Instructions/AVS.     Return for Medicare Annual Wellness Visit in 1 year. Subjective   The following acute and/or chronic problems were also addressed today:  Low back pain, insomnia, tremor. Low back pain: She states that she has been having more low back pain issues lately. Is following with orthopedic specialist through [facility]. She tries to remain physically active and has been getting some stretches. She has been taking fish oil but not sure if there are any other supplements that might be beneficial for her arthritic pain. She prefers to avoid a lot of medications. Insomnia: she has tried melatonin and Advil but has had ongoing issues with broken sleep. She wears CPAP every night during sleep and tolerates well.    She sleeps for about an hour and then wakes up to have to urinate. She does not report urinating a large amount and does not have any other issues with urination. Upon further questioning, she has not tried testing her sugar when she gets up. Tremor: she feels that her symptoms have been better with the Lopressor but still has ongoing symptoms. Worse when trying to eat soup with a spoon. She has not noticed any exacerbation of symptoms. She has seen neurology, Dr. Aguilar Gonsalez in the past who felt that she likely had essential tremor. She was told to try to drink alcohol when experiencing symptoms and see if things improve. She does not really drink alcohol. Symptoms have not been significantly bothersome to her overall however. Diabetes: She has tried different types of foods and has not been able to find a good way to have meals. She does not cook and has tried delivery food services. She has to avoid certain foods because of history of foods. She tends to rely more on macaroni and cereal.   Appetite has changed over time. She has done nutrition consult for diabetes education in the past but finds that it is not very helpful for her given her taste for foods lately and convenience of obtaining foods that are easier to prepare and eat. She has been taking her metformin and glimepiride as prescribed with no known side effects. Tachycardia: She states that she was in the hospital earlier this year after developing a very fast heart beat. She was around 140 when she presented the ER and she is even higher when she got there. She had cardiac work-up and was told that stress testing was normal.  Has had Holter monitor done but needs to have an appointment with cardiology next month. This appointment is scheduled. She has not had any recent significant elevated heart rate episodes or symptoms. Blood pressure has been well controlled on current medication. She was started on statin and Cardizem and has been tolerating medication well.   She has not noticed any myalgias with the statin medication. She had initially been on statin several years ago but stopped thinking that her pain issues could be related to medication but now notes that pain is most likely related to arthritic changes. Patient's complete Health Risk Assessment and screening values have been reviewed and are found in Flowsheets. The following problems were reviewed today and where indicated follow up appointments were made and/or referrals ordered.     Positive Risk Factor Screenings with Interventions:               General Health and ACP:  General  In general, how would you say your health is?: Fair  In the past 7 days, have you experienced any of the following: New or Increased Pain, New or Increased Fatigue, Loneliness, Social Isolation, Stress or Anger?: No  Do you get the social and emotional support that you need?: Yes  Do you have a Living Will?: Yes    Advance Directives     Power of [facility] Will ACP-Advance Directive ACP-Power of     Not on File Not on File Not on File Not on File      General Health Risk Interventions:  · Patient is asked to bring copies of paperwork to the next visit    Health Habits/Nutrition:     Physical Activity: Unknown    Days of Exercise per Week: 5 days    Minutes of Exercise per Session: Patient refused     Have you lost any weight without trying in the past 3 months?: No  Body mass index: (!) 42.88  Have you seen the dentist within the past year?: Yes    Health Habits/Nutrition Interventions:  · Nutritional issues:  educational materials for healthy, well-balanced diet provided     Safety:  Do you have working smoke detectors?: Yes  Do you have any tripping hazards - loose or unsecured carpets or rugs?: (!) Yes  Do you have any tripping hazards - clutter in doorways, halls, or stairs?: No  Do you have either shower bars, grab bars, non-slip mats or non-slip surfaces in your shower or bathtub?: Yes  Do all of your stairways have a railing or banister?: Not Applicable  Do you always fasten your seatbelt when you are in a car?: Yes    Safety Interventions:  · Home safety tips provided           Objective   Vitals:    04/26/22 0828 04/26/22 0836 04/26/22 0906   BP: (!) 140/86 (!) 202/94 136/82   Site: Right Upper Arm Right Upper Arm    Position: Sitting Sitting    Cuff Size: Large Adult Large Adult    Pulse: 63     Resp: 18     Temp: 96.5 °F (35.8 °C)     TempSrc: Temporal     SpO2: 97%     Weight: 219 lb 9.6 oz (99.6 kg)     Height: 5' (1.524 m)        Body mass index is 42.89 kg/m². Neck: neck supple and non tender without mass, no thyromegaly or thyroid nodules, no cervical lymphadenopathy   Pulmonary/Chest: clear to auscultation bilaterally- no wheezes, rales or rhonchi, normal air movement, no respiratory distress  Cardiovascular: normal rate, normal S1 and S2, no gallops, intact distal pulses and no carotid bruits  Abdomen: soft, non-tender, non-distended, normal bowel sounds, no masses or organomegaly  Extremities: no cyanosis and no clubbing       Allergies   Allergen Reactions    Molds & Smuts     Salsalate Other (See Comments)     causes pt to lose hearing    Percocet [Oxycodone-Acetaminophen] Nausea And Vomiting     Prior to Visit Medications    Medication Sig Taking?  Authorizing Provider   Coenzyme Q10 (CO Q 10) 100 MG CAPS Take 1 capsule by mouth daily Yes PRASHANT Panchal CNP   atorvastatin (LIPITOR) 20 MG tablet Take 1 tablet by mouth nightly Yes PRASHANT Panchal CNP   dilTIAZem (CARDIZEM CD) 120 MG extended release capsule Take 1 capsule by mouth daily Yes PRASHANT Panchal CNP   ipratropium (ATROVENT) 0.03 % nasal spray 2 sprays by Each Nostril route every 12 hours Yes Historical Provider, MD   montelukast (SINGULAIR) 10 MG tablet Take 1 tablet by mouth nightly Yes PRASHANT Panchal CNP   metoprolol-hydroCHLOROthiazide (LOPRESSOR HCT) 100-25 MG per tablet Take 1 tablet by mouth daily Yes Minnie Hopkins APRN - CNP   metFORMIN (GLUCOPHAGE) 850 MG tablet TAKE 1 TABLET TWICE A DAY WITH MEALS (NEED APPOINTMENT FOR FURTHER REFILLS) Yes PRASHANT Nava CNP   losartan (COZAAR) 25 MG tablet Take 1 tablet by mouth daily Yes PRASHANT Morales CNP   glimepiride (AMARYL) 1 MG tablet TAKE 1 TABLET BY MOUTH  EVERY MORNING BEFORE  BREAKFAST AND EVERY EVENING BEFORE DINNER. Yes PRASHANT Morales CNP   VENTOLIN  (90 Base) MCG/ACT inhaler INHALE 2 PUFFS EVERY 4 HOURS AS NEEDED Yes PRASHANT Morales CNP   fluticasone (FLOVENT HFA) 110 MCG/ACT inhaler Inhale 2 puffs into the lungs 2 times daily Yes PRASHANT Morales CNP   Omega-3 Fatty Acids (FISH OIL) 1200 MG CAPS Take by mouth Yes Historical Provider, MD   Multiple Vitamins-Minerals (MULTIVITAMIN & MINERAL PO) Take by mouth Yes Historical Provider, MD   aspirin 81 MG tablet Take 81 mg by mouth Yes Historical Provider, MD Mahmood (Including outside providers/suppliers regularly involved in providing care):   Patient Care Team:  PRASHANT Molina CNP as PCP - General (Certified Nurse Practitioner)  PRASHANT Molina CNP as PCP - [facility]  Jose Martin Coates MD as Cardiologist (Interventional Cardiology)  Jose Martin Coates MD as Cardiologist (Interventional Cardiology)    Reviewed and updated this visit:  Tobacco  Allergies  Meds  Med Hx  Surg Hx  Soc Hx  Fam Hx                    Blood pressure is much improved with recheck. Likely secondary to stress. We discussed that in the future we can tie on her annual wellness visit with her routine follow-up to help avoid additional visit needed. Discussed option of consult to neurology if she would like to have further work-up for tremor but symptoms did improve with taking beta-blocker. I was able to review hospital records and recent cardiac testing. Keep appointment with cardiology next month as scheduled.   Medication refills given.    Discussed recommendation for a healthy snack prior to bed that involves complex carbohydrate and protein. She may be experiencing hypoglycemic episodes at night which could be waking her up. She will notify me if her symptoms continue or if glucose levels are normal and she continues to wake up. She prefers to avoid any unnecessary medication and prefers more natural supplement. Discussed option of co-Q10 to help with joint pain. We discussed that glucosamine conjoint and can increase glucose levels but co-Q10 is generally helpful and actually lowering glucose levels. Can also help prevent myalgias secondary to statin. Follow-up in early June as scheduled for routine follow-up/diabetic visit. Please note this report has been partially produced using speech recognition software and may cause contain errors related to that system including grammar, punctuation and spelling as well as words and phrases that may seem inappropriate. If there are questions or concerns please feel free to contact me to clarify.     Electronically signed by PRASHANT Avila CNP-CNP, 9:10 AM 4/26/22

## 2022-05-12 ENCOUNTER — TELEPHONE (OUTPATIENT)
Dept: FAMILY MEDICINE CLINIC | Age: 67
End: 2022-05-12

## 2022-05-12 DIAGNOSIS — Z12.11 SPECIAL SCREENING FOR MALIGNANT NEOPLASMS, COLON: Primary | ICD-10-CM

## 2022-05-12 NOTE — TELEPHONE ENCOUNTER
----- Message from [facility] sent at 5/2/2022  4:10 PM EDT -----  Subject: Message to Provider    QUESTIONS  Information for Provider? Dick Izquierdo from [facility] called in in regards to   the patient's cologuard she wanted the PCP to know that she was sent the   wrong ICD code may be incorrect and may impact processing claims. Please   contact Dick Izquierdo to further assist. Reference number is [id number].  ---------------------------------------------------------------------------  --------------  Lurdes EDWARD  What is the best way for the office to contact you? Do not leave any   message, patient will call back for answer  Preferred Call Back Phone Number? [phone]  ---------------------------------------------------------------------------  --------------  SCRIPT ANSWERS  Relationship to Patient? Third Party  Third Party Type? Other  Other Third Party Type? [employer]  Representative Name?  Dick Izquierdo

## 2022-05-12 NOTE — TELEPHONE ENCOUNTER
The code that was used was colon cancer screening.  Please contact [employer] to see what the preferred code might be?

## 2022-05-13 NOTE — TELEPHONE ENCOUNTER
Reached out to [employer]. They stated the code that was used was Z00.00, the code that needs to be used is either Z12.11 for the colon or Z12.12 for the rectum.

## 2022-05-25 ENCOUNTER — OFFICE VISIT (OUTPATIENT)
Dept: CARDIOLOGY CLINIC | Age: 67
End: 2022-05-25
Payer: MEDICARE

## 2022-05-25 VITALS
HEIGHT: 60 IN | WEIGHT: 219 LBS | TEMPERATURE: 71 F | BODY MASS INDEX: 43 KG/M2 | SYSTOLIC BLOOD PRESSURE: 130 MMHG | DIASTOLIC BLOOD PRESSURE: 80 MMHG | OXYGEN SATURATION: 98 %

## 2022-05-25 DIAGNOSIS — E11.9 CONTROLLED TYPE 2 DIABETES MELLITUS WITHOUT COMPLICATION, WITHOUT LONG-TERM CURRENT USE OF INSULIN (HCC): Primary | ICD-10-CM

## 2022-05-25 DIAGNOSIS — E11.40 TYPE 2 DIABETES MELLITUS WITH DIABETIC NEUROPATHY, WITH LONG-TERM CURRENT USE OF INSULIN (HCC): ICD-10-CM

## 2022-05-25 DIAGNOSIS — Z79.4 TYPE 2 DIABETES MELLITUS WITH DIABETIC NEUROPATHY, WITH LONG-TERM CURRENT USE OF INSULIN (HCC): ICD-10-CM

## 2022-05-25 PROCEDURE — 1123F ACP DISCUSS/DSCN MKR DOCD: CPT | Performed by: INTERNAL MEDICINE

## 2022-05-25 PROCEDURE — G8427 DOCREV CUR MEDS BY ELIG CLIN: HCPCS | Performed by: INTERNAL MEDICINE

## 2022-05-25 PROCEDURE — 2022F DILAT RTA XM EVC RTNOPTHY: CPT | Performed by: INTERNAL MEDICINE

## 2022-05-25 PROCEDURE — 3017F COLORECTAL CA SCREEN DOC REV: CPT | Performed by: INTERNAL MEDICINE

## 2022-05-25 PROCEDURE — 99213 OFFICE O/P EST LOW 20 MIN: CPT | Performed by: INTERNAL MEDICINE

## 2022-05-25 PROCEDURE — 1090F PRES/ABSN URINE INCON ASSESS: CPT | Performed by: INTERNAL MEDICINE

## 2022-05-25 PROCEDURE — G8399 PT W/DXA RESULTS DOCUMENT: HCPCS | Performed by: INTERNAL MEDICINE

## 2022-05-25 PROCEDURE — 1036F TOBACCO NON-USER: CPT | Performed by: INTERNAL MEDICINE

## 2022-05-25 PROCEDURE — 3051F HG A1C>EQUAL 7.0%<8.0%: CPT | Performed by: INTERNAL MEDICINE

## 2022-05-25 PROCEDURE — G8417 CALC BMI ABV UP PARAM F/U: HCPCS | Performed by: INTERNAL MEDICINE

## 2022-05-25 RX ORDER — LIDOCAINE HYDROCHLORIDE 10 MG/ML
INJECTION, SOLUTION INFILTRATION; PERINEURAL
COMMUNITY
Start: 2022-04-21 | End: 2022-09-13 | Stop reason: ALTCHOICE

## 2022-05-25 RX ORDER — CHLORTHALIDONE 25 MG/1
25 TABLET ORAL DAILY
Qty: 30 TABLET | Refills: 3 | Status: SHIPPED | OUTPATIENT
Start: 2022-05-25 | End: 2022-10-10 | Stop reason: SDUPTHER

## 2022-05-25 RX ORDER — LORATADINE 10 MG/1
TABLET ORAL
COMMUNITY
Start: 2016-04-29 | End: 2022-06-30 | Stop reason: ALTCHOICE

## 2022-05-25 RX ORDER — METOPROLOL SUCCINATE 100 MG/1
150 TABLET, EXTENDED RELEASE ORAL DAILY
Qty: 90 TABLET | Refills: 5 | Status: SHIPPED | OUTPATIENT
Start: 2022-05-25 | End: 2022-09-13 | Stop reason: SDUPTHER

## 2022-05-25 ASSESSMENT — ENCOUNTER SYMPTOMS
APNEA: 0
COUGH: 0
ABDOMINAL PAIN: 0
VOMITING: 0
DIARRHEA: 0
COLOR CHANGE: 0
WHEEZING: 0
NAUSEA: 0
CONSTIPATION: 0
SHORTNESS OF BREATH: 0
EYE REDNESS: 0
CHEST TIGHTNESS: 0
RHINORRHEA: 0

## 2022-05-25 NOTE — PROGRESS NOTES
Chief Complaint   Patient presents with    3 Month Follow-Up     for HTN.  Results     of Holter Monitor. Patient presents for initial medical evaluation. Patient is followed on a regular basis by Dr. Blaine Francisco, PRASHANT - CNP. Morbidly obese  Diabetes  LYNNE on CPAP  Hypertension  Mild MR  Hyperlipidemia  Recent hospitalization to [facility] on 1/21/2022 and discharged 1/22/2022 for palpitations. Nuclear stress test negative  Nuclear stress test on 1/21/2022 normal EF 82%. Inferior wall filling defect possibly breast tissue attenuation. Otherwise no ischemia noted. Echocardiogram 1/21/2022 EF 60%, severely dilated left atria, mild MR  Holter 3/1/2022 1 run 4 beats of V. tach no other major arrhythmias    5/25/2022  Follow-up visit on palpitations. Patient underwent a Holter monitor with was reported as 1 run of 4 beats of V. tach  No other major arrhythmias no A. Fib  Patient reports that she is more physically active  States that probably because of physical activity she does not feel the palpitations anymore. Compliant with all her medications without side effects  She continues to use her CPAP at night  I would like to increase her metoprolol      2/23/2022  First clinic visit follow-up after hospitalization for palpitations and chest pain  During that hospitalization cardiac enzymes were negative, EKG without ischemia, patient was discharged after a negative nuclear stress test.    Today patient reports that she is feeling better. She is wearing a fit arm band   TSH normal a month ago  Reports that during the day sometimes she notices that her heart rate goes up to the 150s  Reports that diltiazem is making her feel better and improves her heart rate. Currently 120 mg daily, patient also on metoprolol  No chest pain or shortness of breath with physical activity  Patient states that she is now doing gardening, and is wondering if she can do more physical activity than that.   She likes to bike.  From cardiology standpoint she is cleared to do as much physical activity as tolerated      Patient Active Problem List   Diagnosis    Controlled diabetes mellitus type II without complication ([address]    Pseudophakia    After-cataract obscuring vision    LYNNE on CPAP    Presence of intraocular lens    Sprain of medial collateral ligament of left knee    Type 2 diabetes mellitus without complication (HCC)    Asthma    Essential tremor    History of kidney stones    Maier's neuroma of both feet    Allergic rhinitis    Neuropathy    DDD (degenerative disc disease), lumbosacral    Low HDL (under 40)    Essential hypertension    Morbidly obese (HCC)    Chest pain    Type 2 diabetes mellitus with diabetic neuropathy       Past Surgical History:   Procedure Laterality Date    CARPAL TUNNEL RELEASE Bilateral 80's    carpal tunnel both wrists and right foot, MAIER'S NEUROMA    EYE SURGERY Right 03/2017    had film removed on right eye    JOINT REPLACEMENT Left 09/22/2010    left thumb replacement    KNEE SURGERY Bilateral     torn meniscus 1/16/2009, 2/12/2009, 2/2016    KNEE SURGERY Left 02/2016    OVARIAN CYST REMOVAL  05/13/2008    ROTATOR CUFF REPAIR Left 01/08/2004    ROTATOR CUFF REPAIR Right 08/22/2005    TONSILLECTOMY AND ADENOIDECTOMY  1963       Social History     Socioeconomic History    Marital status: Single     Spouse name: None    Number of children: None    Years of education: None    Highest education level: None   Occupational History    None   Tobacco Use    Smoking status: Never Smoker    Smokeless tobacco: Never Used   Vaping Use    Vaping Use: Never used   Substance and Sexual Activity    Alcohol use: Yes     Comment: occassionally    Drug use: No    Sexual activity: Not Currently   Other Topics Concern    None   Social History Narrative    None     Social Determinants of Health     Financial Resource Strain: Low Risk     Difficulty of Paying Living Expenses: Not hard at all   Food Insecurity: No Food Insecurity    Worried About [facility] in the Last Year: Never true    Annie of Food in the Last Year: Never true   Transportation Needs:     Lack of Transportation (Medical): Not on file    Lack of Transportation (Non-Medical):  Not on file   Physical Activity: Unknown    Days of Exercise per Week: 5 days    Minutes of Exercise per Session: Patient refused   Stress:     Feeling of Stress : Not on file   Social Connections:     Frequency of Communication with Friends and Family: Not on file    Frequency of Social Gatherings with Friends and Family: Not on file    Attends Sabianist Services: Not on file    Active Member of Clubs or Organizations: Not on file    Attends Club or Organization Meetings: Not on file    Marital Status: Not on file   Intimate Partner Violence:     Fear of Current or Ex-Partner: Not on file    Emotionally Abused: Not on file    Physically Abused: Not on file    Sexually Abused: Not on file   Housing Stability:     Unable to Pay for Housing in the Last Year: Not on file    Number of [place] in the Last Year: Not on file    Unstable Housing in the Last Year: Not on file       Family History   Problem Relation Age of Onset    Lung Cancer Mother     Diabetes Mother     Hypertension Mother     Diabetes Father     Hypertension Father     Hypertension Brother     Diabetes Brother     Breast Cancer Maternal Aunt        Current Outpatient Medications   Medication Sig Dispense Refill    lidocaine 1 % injection Lidocaine HCl - 1 % Injection Solution INJECT 9 ML Intra-articular Quantity: 0 Refills: 0 Ordered: 21-Apr-2022 Zoila Garcia Start : 21-Apr-2022 Complete LT Hip      loratadine (CLARITIN) 10 MG tablet Take by mouth      metoprolol succinate (TOPROL XL) 100 MG extended release tablet Take 1.5 tablets by mouth daily 90 tablet 5    chlorthalidone (HYGROTON) 25 MG tablet Take 1 tablet by mouth daily 30 tablet 3    Coenzyme Q10 (CO Q 10) 100 MG CAPS Take 1 capsule by mouth daily 30 capsule 11    atorvastatin (LIPITOR) 20 MG tablet Take 1 tablet by mouth nightly 90 tablet 1    dilTIAZem (CARDIZEM CD) 120 MG extended release capsule Take 1 capsule by mouth daily 90 capsule 1    ipratropium (ATROVENT) 0.03 % nasal spray 2 sprays by Each Nostril route every 12 hours      montelukast (SINGULAIR) 10 MG tablet Take 1 tablet by mouth nightly 90 tablet 2    metFORMIN (GLUCOPHAGE) 850 MG tablet TAKE 1 TABLET TWICE A DAY WITH MEALS (NEED APPOINTMENT FOR FURTHER REFILLS) 180 tablet 3    losartan (COZAAR) 25 MG tablet Take 1 tablet by mouth daily 90 tablet 2    glimepiride (AMARYL) 1 MG tablet TAKE 1 TABLET BY MOUTH  EVERY MORNING BEFORE  BREAKFAST AND EVERY EVENING BEFORE DINNER. 180 tablet 2    VENTOLIN  (90 Base) MCG/ACT inhaler INHALE 2 PUFFS EVERY 4 HOURS AS NEEDED 1 each 3    fluticasone (FLOVENT HFA) 110 MCG/ACT inhaler Inhale 2 puffs into the lungs 2 times daily 1 each 2    Omega-3 Fatty Acids (FISH OIL) 1200 MG CAPS Take by mouth      Multiple Vitamins-Minerals (MULTIVITAMIN & MINERAL PO) Take by mouth      aspirin 81 MG tablet Take 81 mg by mouth       No current facility-administered medications for this visit. Beclomethasone, Molds & smuts, Salsalate, and Percocet [oxycodone-acetaminophen]    Review of Systems:  Review of Systems   Constitutional: Negative for activity change, chills, diaphoresis and fever. HENT: Negative for congestion, ear pain, nosebleeds and rhinorrhea. Eyes: Negative for redness and visual disturbance. Respiratory: Negative for apnea, cough, chest tightness, shortness of breath and wheezing. Cardiovascular: Negative for chest pain, palpitations and leg swelling. Gastrointestinal: Negative for abdominal pain, constipation, diarrhea, nausea and vomiting. Genitourinary: Negative for difficulty urinating and dysuria. Musculoskeletal: Negative. Negative for joint swelling. Skin: Negative for color change, rash and wound. Neurological: Negative for dizziness, syncope, weakness, numbness and headaches. Psychiatric/Behavioral: Negative. VITALS:  Blood pressure 130/80, temperature (!) 71 °F (21.7 °C), height 5' (1.524 m), weight 219 lb (99.3 kg), SpO2 98 %, not currently breastfeeding. Body mass index is 42.77 kg/m². Physical Examination:  Physical Exam  Vitals and nursing note reviewed. Constitutional:       Appearance: Normal appearance. She is obese. HENT:      Head: Normocephalic and atraumatic. Mouth/Throat:      Mouth: Mucous membranes are moist.      Pharynx: Oropharynx is clear. Eyes:      Extraocular Movements: Extraocular movements intact. Conjunctiva/sclera: Conjunctivae normal.      Pupils: Pupils are equal, round, and reactive to light. Cardiovascular:      Rate and Rhythm: Normal rate and regular rhythm. Pulses: Normal pulses. Heart sounds: Normal heart sounds. Pulmonary:      Effort: Pulmonary effort is normal.      Breath sounds: Normal breath sounds. Abdominal:      General: Abdomen is flat. Bowel sounds are normal.      Palpations: Abdomen is soft. Musculoskeletal:         General: Normal range of motion. Cervical back: Normal range of motion and neck supple. Skin:     General: Skin is warm. Neurological:      General: No focal deficit present. Mental Status: She is alert and oriented to person, place, and time. Mental status is at baseline. Psychiatric:         Mood and Affect: Mood normal.        EKG: Sinus rhythm    No orders of the defined types were placed in this encounter. The 10-year ASCVD risk score ([address] Fred[address] Shayy StrMaritza, et al., 2013) is: 17.2%    Values used to calculate the score:      Age: 79 years      Sex: Female      Is Non- : No      Diabetic: Yes      Tobacco smoker: No      Systolic Blood Pressure: 641 mmHg      Is BP treated:  Yes HDL Cholesterol: 41 mg/dL      Total Cholesterol: 152 mg/dL     ASSESSMENT:     Diagnosis Orders   1. Controlled type 2 diabetes mellitus without complication, without long-term current use of insulin ([address]     2. Type 2 diabetes mellitus with diabetic neuropathy, with long-term current use of insulin ([facility])           PLAN:   Palpitations. Condition improving  Holter monitor March 2022 capture 1 run of 4 beats of V. tach. Patient had a echocardiogram and a stress test at the beginning of the year which both were negative  I would like to increase her metoprolol from 100 mg daily to 150 mg daily  I would like to start her on chlorthalidone and stop hydrochlorothiazide  Continue atorvastatin 20 mg daily  Continue diltiazem 120 mg daily    Hypertension  Continue losartan 25 mg daily  I will start patient on hydrochlorothiazide  I instructed patient to check her blood pressures daily keep a log and to bring those readings to next clinic appointment    JACQUELYN in the future    Return to clinic in 6 months    Recommended patient to eat diets that emphasize high intakes of vegetables, fruits, nuts, whole grains, lean vegetable or animal protein, and fish. As per 2019 ACC/AHA guideline on primary prevention of CVD     Recommended patient to engage in at least 150 minutes per week of accumulated moderate-intensity physical activity or 75 minutes per week of vigorous-intensity physical activity as per 2019 ACC/AHA guideline on primary prevention of CVD           Continue medications at current doses.

## 2022-05-31 ENCOUNTER — HOSPITAL ENCOUNTER (OUTPATIENT)
Dept: LAB | Age: 67
Discharge: HOME | End: 2022-05-31
Payer: MEDICARE

## 2022-05-31 DIAGNOSIS — E11.9 TYPE 2 DIABETES MELLITUS WITHOUT COMPLICATION, WITHOUT LONG-TERM CURRENT USE OF INSULIN (HCC): ICD-10-CM

## 2022-05-31 LAB
ALBUMIN SERPL-MCNC: 4 G/DL (ref 3.5–4.6)
ALP BLD-CCNC: 96 U/L (ref 40–130)
ALT SERPL-CCNC: 24 U/L (ref 0–33)
ANION GAP SERPL CALCULATED.3IONS-SCNC: 14 MEQ/L (ref 9–15)
AST SERPL-CCNC: 15 U/L (ref 0–35)
BASOPHILS ABSOLUTE: 0 K/UL (ref 0–0.2)
BASOPHILS RELATIVE PERCENT: 0.4 %
BILIRUB SERPL-MCNC: 0.6 MG/DL (ref 0.2–0.7)
BUN BLDV-MCNC: 15 MG/DL (ref 8–23)
CALCIUM SERPL-MCNC: 9 MG/DL (ref 8.5–9.9)
CHLORIDE BLD-SCNC: 98 MEQ/L (ref 95–107)
CHOLESTEROL, TOTAL: 96 MG/DL (ref 0–199)
CO2: 25 MEQ/L (ref 20–31)
CREAT SERPL-MCNC: 0.66 MG/DL (ref 0.5–0.9)
CREATININE URINE: 361.2 MG/DL
EOSINOPHILS ABSOLUTE: 0.1 K/UL (ref 0–0.7)
EOSINOPHILS RELATIVE PERCENT: 1.6 %
GFR AFRICAN AMERICAN: >60
GFR NON-AFRICAN AMERICAN: >60
GLOBULIN: 2.5 G/DL (ref 2.3–3.5)
GLUCOSE BLD-MCNC: 299 MG/DL (ref 70–99)
HBA1C MFR BLD: 10.1 % (ref 4.8–5.9)
HCT VFR BLD CALC: 44.7 % (ref 37–47)
HDLC SERPL-MCNC: 42 MG/DL (ref 40–59)
HEMOGLOBIN: 15 G/DL (ref 12–16)
LDL CHOLESTEROL CALCULATED: 27 MG/DL (ref 0–129)
LYMPHOCYTES ABSOLUTE: 1.8 K/UL (ref 1–4.8)
LYMPHOCYTES RELATIVE PERCENT: 23.7 %
MCH RBC QN AUTO: 30.2 PG (ref 27–31.3)
MCHC RBC AUTO-ENTMCNC: 33.5 % (ref 33–37)
MCV RBC AUTO: 90.3 FL (ref 82–100)
MICROALBUMIN UR-MCNC: 3.1 MG/DL
MICROALBUMIN/CREAT UR-RTO: 8.6 MG/G (ref 0–30)
MONOCYTES ABSOLUTE: 0.5 K/UL (ref 0.2–0.8)
MONOCYTES RELATIVE PERCENT: 6.9 %
NEUTROPHILS ABSOLUTE: 5.1 K/UL (ref 1.4–6.5)
NEUTROPHILS RELATIVE PERCENT: 67.4 %
PDW BLD-RTO: 15.7 % (ref 11.5–14.5)
PLATELET # BLD: 299 K/UL (ref 130–400)
POTASSIUM SERPL-SCNC: 3.5 MEQ/L (ref 3.4–4.9)
RBC # BLD: 4.95 M/UL (ref 4.2–5.4)
SODIUM BLD-SCNC: 137 MEQ/L (ref 135–144)
TOTAL PROTEIN: 6.5 G/DL (ref 6.3–8)
TRIGL SERPL-MCNC: 135 MG/DL (ref 0–150)
WBC # BLD: 7.6 K/UL (ref 4.8–10.8)

## 2022-05-31 PROCEDURE — 80061 LIPID PANEL: CPT

## 2022-05-31 PROCEDURE — 82570 ASSAY OF URINE CREATININE: CPT

## 2022-05-31 PROCEDURE — 82043 UR ALBUMIN QUANTITATIVE: CPT

## 2022-05-31 PROCEDURE — 83036 HEMOGLOBIN GLYCOSYLATED A1C: CPT

## 2022-05-31 PROCEDURE — 80053 COMPREHEN METABOLIC PANEL: CPT

## 2022-05-31 PROCEDURE — 85025 COMPLETE CBC W/AUTO DIFF WBC: CPT

## 2022-05-31 PROCEDURE — 36415 COLL VENOUS BLD VENIPUNCTURE: CPT

## 2022-06-02 ENCOUNTER — OFFICE VISIT (OUTPATIENT)
Dept: FAMILY MEDICINE CLINIC | Age: 67
End: 2022-06-02
Payer: MEDICARE

## 2022-06-02 VITALS
WEIGHT: 216.2 LBS | BODY MASS INDEX: 42.45 KG/M2 | HEIGHT: 60 IN | SYSTOLIC BLOOD PRESSURE: 134 MMHG | RESPIRATION RATE: 16 BRPM | HEART RATE: 61 BPM | TEMPERATURE: 96.1 F | DIASTOLIC BLOOD PRESSURE: 76 MMHG | OXYGEN SATURATION: 98 %

## 2022-06-02 DIAGNOSIS — Z86.010 HISTORY OF COLON POLYPS: ICD-10-CM

## 2022-06-02 DIAGNOSIS — Z12.11 COLON CANCER SCREENING: ICD-10-CM

## 2022-06-02 DIAGNOSIS — E78.5 DYSLIPIDEMIA: ICD-10-CM

## 2022-06-02 DIAGNOSIS — I10 ESSENTIAL HYPERTENSION: ICD-10-CM

## 2022-06-02 DIAGNOSIS — G25.0 ESSENTIAL TREMOR: ICD-10-CM

## 2022-06-02 DIAGNOSIS — G47.33 OSA ON CPAP: ICD-10-CM

## 2022-06-02 DIAGNOSIS — E11.65 TYPE 2 DIABETES MELLITUS WITH HYPERGLYCEMIA, WITHOUT LONG-TERM CURRENT USE OF INSULIN (HCC): Primary | ICD-10-CM

## 2022-06-02 DIAGNOSIS — M54.41 CHRONIC RIGHT-SIDED LOW BACK PAIN WITH RIGHT-SIDED SCIATICA: ICD-10-CM

## 2022-06-02 DIAGNOSIS — G89.29 CHRONIC RIGHT-SIDED LOW BACK PAIN WITH RIGHT-SIDED SCIATICA: ICD-10-CM

## 2022-06-02 DIAGNOSIS — E11.40 TYPE 2 DIABETES MELLITUS WITH DIABETIC NEUROPATHY, WITHOUT LONG-TERM CURRENT USE OF INSULIN (HCC): ICD-10-CM

## 2022-06-02 DIAGNOSIS — R00.0 TACHYCARDIA: ICD-10-CM

## 2022-06-02 DIAGNOSIS — Z99.89 OSA ON CPAP: ICD-10-CM

## 2022-06-02 PROCEDURE — G8399 PT W/DXA RESULTS DOCUMENT: HCPCS | Performed by: NURSE PRACTITIONER

## 2022-06-02 PROCEDURE — 1036F TOBACCO NON-USER: CPT | Performed by: NURSE PRACTITIONER

## 2022-06-02 PROCEDURE — 3046F HEMOGLOBIN A1C LEVEL >9.0%: CPT | Performed by: NURSE PRACTITIONER

## 2022-06-02 PROCEDURE — 99214 OFFICE O/P EST MOD 30 MIN: CPT | Performed by: NURSE PRACTITIONER

## 2022-06-02 PROCEDURE — 1123F ACP DISCUSS/DSCN MKR DOCD: CPT | Performed by: NURSE PRACTITIONER

## 2022-06-02 PROCEDURE — 3017F COLORECTAL CA SCREEN DOC REV: CPT | Performed by: NURSE PRACTITIONER

## 2022-06-02 PROCEDURE — G8417 CALC BMI ABV UP PARAM F/U: HCPCS | Performed by: NURSE PRACTITIONER

## 2022-06-02 PROCEDURE — 1090F PRES/ABSN URINE INCON ASSESS: CPT | Performed by: NURSE PRACTITIONER

## 2022-06-02 PROCEDURE — 2022F DILAT RTA XM EVC RTNOPTHY: CPT | Performed by: NURSE PRACTITIONER

## 2022-06-02 PROCEDURE — G8427 DOCREV CUR MEDS BY ELIG CLIN: HCPCS | Performed by: NURSE PRACTITIONER

## 2022-06-02 RX ORDER — GLIMEPIRIDE 2 MG/1
TABLET ORAL
Qty: 180 TABLET | Refills: 2 | Status: SHIPPED | OUTPATIENT
Start: 2022-06-02 | End: 2022-09-13 | Stop reason: SDUPTHER

## 2022-06-02 NOTE — PROGRESS NOTES
Subjective:     Diabetes Mellitus Type 2: Current symptoms/problems include neuropathy, polyuria and polydipsia. Home blood sugar records:  trend: increasing steadily  Any episodes of hypoglycemia? no  Tobacco history: She  reports that she has never smoked. She has never used smokeless tobacco.   Known diabetic complications: peripheral neuropathy   She states that she has had a difficult time eating certain foods. Can generally tolerate cereal and pasta more than other types of foods. States that many other types of foods cause her stomach to feel upset. She also has to be careful about eating some healthier foods due to risk of developing kidney stone again. She is not sure if she has been drinking as much water as she should. She even went a few days without eating much after the recent loss of her hdez retriever who passed away suddenly. Otherwise, she states that she has been getting routine physical activity and has been walking through [employer]. Hypertension:  Home blood pressure monitoring: No.  She is adherent to a low sodium diet. Antihypertensive medication side effects: no medication side effects noted. Use of agents associated with hypertension: none. Hyperlipidemia:  No new myalgias or GI upset on atorvastatin (Lipitor). She has been taking Co Q 10 and this has been allowing her to sleep better at night. CPAP: Aden Hernandez has obstructive sleep apnea that is treated with CPAP. The CPAP is used  7 hours 7 nights per week. The CPAP use helps the daytime sleepiness and low energy levels. She has been following with Dr. Praveen Llanos but would like to follow with a different sleep medicine provider. Tremor/palpitations: She follows with [facility] cardiology and recently had a visit. She has had dose increased of beta blocker and has been tolerating well. She has not noticed any worsening of tremor over time and has not had any recent significant heart palpitations.   She does not report any medication side effects. She states that her heart rate tends to fluctuate somewhat but no significantly low heart rates or high heart rates that she is aware of. Chronic low back pain: she had an injection to the hip in the spring. She follows with [facility] orthopedic specialist and completed physical therapy. Was having worsened back pain and hip area pain. She has also been having some occasional issues with left knee pain but states that overall her symptoms have been stable with no other/recent exacerbation. History of colon polyps: She states that it has been a while since her last colonoscopy. She had a Cologuard a few years ago that was normal but would refer to have colonoscopy done in the event that polyps are present so that they can be removed. She has not had any recent change in bowel patterns. No dark tarry or bloody stools reported. Would prefer to have consult and procedure done in the [facility] so that she can have a ride home. The five diabetic measures for control:   Hemoglobin A1C (%)   Date Value   05/31/2022 10.1 (H)     LDL Calculated (mg/dL)   Date Value   05/31/2022 27         Blood pressure less than 131/81,   BP Readings from Last 1 Encounters:   06/02/22 134/76     Smoking, non smoker is goal. This pt is not a smoker. This pt does an aspirin a day. Last eye exam was 2021  Last diabetic foot exam was 2021  Last urine microalbumin creatinine ratio was 2022    PMH: This 79 y.o. female  patient is  hypertensive, is  diabetic, is  hyperlipidemic. She has a history of smoking and has a  family history of heart disease. She is  obese. Review of Systems  Patient denies chest pain, shortness of breath, lightheadedness, blurred vision, peripheral edema and dry cough. Eyes: no rapid change in vision  Ears, nose, mouth, throat, and face: no changes in hearing or sore throat  Respiratory: No shortness of breath or cough.   Cardiovascular: no chest pain or Patient was counseled regarding disease risks and adopting healthy behaviors. Patient was provided education materials to assist with self management. Patient was provided log (or received log during previous visit) to record blood pressure, food intake and/or blood sugar. Patient was instructed to keep log up-to-date and to always bring log to all office visits. Follow up: 3 months and as needed. Blood work one week prior as ordered. 1. 2.  Diabetes is with increased hemoglobin A1c now greater than 10. She has had steroid injection earlier in the spring and admits to eating certain types of foods that are better tolerated by her but may raise her glucose levels. She has not been drinking enough water but plans to increase in the near future. She also plans to continue with routine physical activity. We discussed recommendation to maximize dosing of metformin and Amaryl. Will increase metformin to 1000 mg twice daily. Kidney function is preserved. Discussed mild protein in urine and she will plan to continue with increased water intake and will repeat blood work and urine testing prior to next visit. Will increase Amaryl to 2 mg twice daily. She is encouraged to take with food especially if she develops any low sugar episodes with the medication. We will plan to repeat blood work prior to next visit in 3 months. Notify me sooner if needed. 3.  Blood pressure is well controlled on current medication. Recheck blood pressure is much improved today. No medication side effects reported. 4.  Lipid panel is very well controlled on statin. She plans to continue to follow routinely with cardiology as well. No medication side effects reported. 5.  She has been tolerating CPAP well but her device is over 8years old. Current referral to sleep medicine given for [facility] provider. 6.  7.  Symptoms have been well managed with no recent exacerbation.   She does not report any medication side effects with beta-blocker or calcium channel blocker. 8.  Symptoms have been well managed and she continues to follow with [address] [place] [address] orthopedic specialist for ongoing back pain, hip pain and knee pain issues. No recent exacerbation. 9.  10.  Referral given to [facility] gastroenterology for routine colonoscopy. Patient does have history of colon polyps. No current GI symptoms reported. Prefers to have consult in the [place] area. Please note this report has been partially produced using speech recognition software and may cause contain errors related to that system including grammar, punctuation and spelling as well as words and phrases that may seem inappropriate. If there are questions or concerns please feel free to contact me to clarify.         Electronically signed by PRASHANT Negrete, 9:09 AM 6/2/22

## 2022-06-08 ENCOUNTER — OFFICE VISIT (OUTPATIENT)
Dept: PULMONOLOGY | Age: 67
End: 2022-06-08
Payer: MEDICARE

## 2022-06-08 VITALS
HEIGHT: 60 IN | DIASTOLIC BLOOD PRESSURE: 76 MMHG | HEART RATE: 78 BPM | SYSTOLIC BLOOD PRESSURE: 118 MMHG | TEMPERATURE: 97.6 F | WEIGHT: 215 LBS | BODY MASS INDEX: 42.21 KG/M2 | OXYGEN SATURATION: 98 %

## 2022-06-08 DIAGNOSIS — R06.02 SHORTNESS OF BREATH: ICD-10-CM

## 2022-06-08 DIAGNOSIS — G47.33 OSA ON CPAP: ICD-10-CM

## 2022-06-08 DIAGNOSIS — J45.20 MILD INTERMITTENT ASTHMA WITHOUT COMPLICATION: ICD-10-CM

## 2022-06-08 DIAGNOSIS — I10 HYPERTENSION, UNSPECIFIED TYPE: ICD-10-CM

## 2022-06-08 DIAGNOSIS — E66.9 OBESITY, UNSPECIFIED CLASSIFICATION, UNSPECIFIED OBESITY TYPE, UNSPECIFIED WHETHER SERIOUS COMORBIDITY PRESENT: ICD-10-CM

## 2022-06-08 DIAGNOSIS — G47.33 OSA (OBSTRUCTIVE SLEEP APNEA): Primary | ICD-10-CM

## 2022-06-08 DIAGNOSIS — Z99.89 OSA ON CPAP: ICD-10-CM

## 2022-06-08 PROCEDURE — G8399 PT W/DXA RESULTS DOCUMENT: HCPCS | Performed by: INTERNAL MEDICINE

## 2022-06-08 PROCEDURE — 1123F ACP DISCUSS/DSCN MKR DOCD: CPT | Performed by: INTERNAL MEDICINE

## 2022-06-08 PROCEDURE — 1036F TOBACCO NON-USER: CPT | Performed by: INTERNAL MEDICINE

## 2022-06-08 PROCEDURE — 1090F PRES/ABSN URINE INCON ASSESS: CPT | Performed by: INTERNAL MEDICINE

## 2022-06-08 PROCEDURE — G8417 CALC BMI ABV UP PARAM F/U: HCPCS | Performed by: INTERNAL MEDICINE

## 2022-06-08 PROCEDURE — 99204 OFFICE O/P NEW MOD 45 MIN: CPT | Performed by: INTERNAL MEDICINE

## 2022-06-08 PROCEDURE — 3017F COLORECTAL CA SCREEN DOC REV: CPT | Performed by: INTERNAL MEDICINE

## 2022-06-08 PROCEDURE — G8428 CUR MEDS NOT DOCUMENT: HCPCS | Performed by: INTERNAL MEDICINE

## 2022-06-27 ENCOUNTER — TELEPHONE (OUTPATIENT)
Dept: PULMONOLOGY | Age: 67
End: 2022-06-27

## 2022-06-27 NOTE — TELEPHONE ENCOUNTER
[facility]EP [facility] CALLED IN TO THE OFFICE REQUESTING ORDER BECHANGED TO A BASELINE SS.         PLEASE FAX ORDER TO SLEEP LAB

## 2022-06-28 DIAGNOSIS — G47.33 OSA ON CPAP: Primary | ICD-10-CM

## 2022-06-28 DIAGNOSIS — Z99.89 OSA ON CPAP: Primary | ICD-10-CM

## 2022-06-30 ENCOUNTER — OFFICE VISIT (OUTPATIENT)
Dept: FAMILY MEDICINE CLINIC | Age: 67
End: 2022-06-30
Payer: MEDICARE

## 2022-06-30 VITALS — WEIGHT: 211.2 LBS | TEMPERATURE: 98.6 F | HEIGHT: 60 IN | BODY MASS INDEX: 41.46 KG/M2

## 2022-06-30 DIAGNOSIS — D49.2 ABNORMAL SKIN GROWTH: Primary | ICD-10-CM

## 2022-06-30 DIAGNOSIS — L29.9 ITCHING: ICD-10-CM

## 2022-06-30 DIAGNOSIS — L98.9 CHANGING SKIN LESION: ICD-10-CM

## 2022-06-30 DIAGNOSIS — D49.2 ABNORMAL SKIN GROWTH: ICD-10-CM

## 2022-06-30 PROCEDURE — 1090F PRES/ABSN URINE INCON ASSESS: CPT | Performed by: FAMILY MEDICINE

## 2022-06-30 PROCEDURE — 11307 SHAVE SKIN LESION 1.1-2.0 CM: CPT | Performed by: FAMILY MEDICINE

## 2022-06-30 PROCEDURE — 1036F TOBACCO NON-USER: CPT | Performed by: FAMILY MEDICINE

## 2022-06-30 PROCEDURE — G8427 DOCREV CUR MEDS BY ELIG CLIN: HCPCS | Performed by: FAMILY MEDICINE

## 2022-06-30 PROCEDURE — 3017F COLORECTAL CA SCREEN DOC REV: CPT | Performed by: FAMILY MEDICINE

## 2022-06-30 PROCEDURE — G8417 CALC BMI ABV UP PARAM F/U: HCPCS | Performed by: FAMILY MEDICINE

## 2022-06-30 PROCEDURE — 1123F ACP DISCUSS/DSCN MKR DOCD: CPT | Performed by: FAMILY MEDICINE

## 2022-06-30 PROCEDURE — G8399 PT W/DXA RESULTS DOCUMENT: HCPCS | Performed by: FAMILY MEDICINE

## 2022-06-30 PROCEDURE — 99213 OFFICE O/P EST LOW 20 MIN: CPT | Performed by: FAMILY MEDICINE

## 2022-06-30 NOTE — PATIENT INSTRUCTIONS
Incision/Laceration repair  As long as current lesion only comes back as a precancerous lesion we will keep 1 year skin follow-up. However if it has become cancerous in any form we will change to every 6-month skin checks.      -Clean surgical area with antibacterial soap and water once daily. --You may be instructed to soak the wound with Hydrogen Peroxide to loosen scabbing around sutures, this is not to be done more often that every 3 days, should be for 30 seconds-1 min and then rinsed off with water.     -Keep surgical site moist with vaseline or antibiotic ointment (single- Bacitracin, not triple antibiotic or Neosporin) and apply a fresh bandage daily until a solid scab forms or if the wound is at risk for trauma or dirt. It is important to leave the wound uncovered part of the day to allow the skin to dry and avoid breakdown from excessive moisture. There may be exceptions to this, the staff will provide information if your wound requires a variation in this, that will often involve a prescription ointment or cream.     -Follow up immediately if any growing redness (minimal redness or pale pink is normal along wound edges) surrounds the surgical site or if dripping drainage occurs at surgical site. Once a solid scab forms no more bandage needed. A wet scab can look yellow. This is not infection, just moisture.    -Once the lesion is healed be sure to apply sunscreen to the area to prevent burn of the newer and more delicate skin especially during the first 6 months of healing.        -If the scar begins to be raised you may massage the area firmly twice a day to help break down scar tissue and help the area become a flat scar. There is some evidence that Mederma applied to a scar daily for the first few months can help shrink and fade it more quickly then without intervention.

## 2022-06-30 NOTE — PROGRESS NOTES
Diagnosis Orders   1. Abnormal skin growth  CA SHAV SKIN LES 11-20MM REMAINDR BODY    Specimen to Pathology Outpatient   2. Changing skin lesion  CA SHAV SKIN LES 11-20MM REMAINDR BODY    Specimen to Pathology Outpatient   3. Itching  CA SHAV SKIN LES 11-20MM REMAINDR BODY    Specimen to Pathology Outpatient         Orders Placed This Encounter   Procedures    Specimen to Pathology Outpatient     Margins requested on all specimens  R/O squamous cell cancer  Location Center thoracic upper back     Standing Status:   Future     Standing Expiration Date:   2023     Order Specific Question:   PREVIOUS BIOPSY     Answer:   No     Order Specific Question:   PREOP DIAGNOSIS     Answer:   Abnormal skin lesion itching with changing     Order Specific Question:   FROZEN SECTION - NO OR YES/SPECIMEN     Answer:   No    CA SHAV SKIN LES 11-20MM [facility] [address] back upper thoracic       Elyn Corns was seen today for skin exam and skin problem. Diagnoses and all orders for this visit:    Abnormal skin growth  -     CA SHAV SKIN LES 11-20MM REMAINDR BODY  -     Specimen to Pathology Outpatient; Future    Changing skin lesion  -     CA SHAV SKIN LES 11-20MM REMAINDR BODY  -     Specimen to Pathology Outpatient; Future    Itching  -     CA SHAV SKIN LES 11-20MM REMAINDR BODY  -     Specimen to Pathology Outpatient; Future        Return in about 1 year (around 2023) for 15 min skin check. Patient Instructions   Incision/Laceration repair  As long as current lesion only comes back as a precancerous lesion we will keep 1 year skin follow-up. However if it has become cancerous in any form we will change to every 6-month skin checks.      -Clean surgical area with antibacterial soap and water once daily.       --You may be instructed to soak the wound with Hydrogen Peroxide to loosen scabbing around sutures, this is not to be done more often that every 3 days, should be for 30 seconds-1 min and then rinsed off with water.     -Keep surgical site moist with vaseline or antibiotic ointment (single- Bacitracin, not triple antibiotic or Neosporin) and apply a fresh bandage daily until a solid scab forms or if the wound is at risk for trauma or dirt. It is important to leave the wound uncovered part of the day to allow the skin to dry and avoid breakdown from excessive moisture. There may be exceptions to this, the staff will provide information if your wound requires a variation in this, that will often involve a prescription ointment or cream.     -Follow up immediately if any growing redness (minimal redness or pale pink is normal along wound edges) surrounds the surgical site or if dripping drainage occurs at surgical site. Once a solid scab forms no more bandage needed. A wet scab can look yellow. This is not infection, just moisture.    -Once the lesion is healed be sure to apply sunscreen to the area to prevent burn of the newer and more delicate skin especially during the first 6 months of healing.        -If the scar begins to be raised you may massage the area firmly twice a day to help break down scar tissue and help the area become a flat scar. There is some evidence that Mederma applied to a scar daily for the first few months can help shrink and fade it more quickly then without intervention. Patient is here for skin check. History of actinic keratosis. Patient states she is only concerned about 1 lesion that is prickly and keeps itching on her back.   No treatments tried for this lesion yet      Current Outpatient Medications on File Prior to Visit   Medication Sig Dispense Refill    glimepiride (AMARYL) 2 MG tablet TAKE 1 TABLET BY MOUTH  EVERY MORNING BEFORE  BREAKFAST AND EVERY EVENING BEFORE DINNER. 180 tablet 2    metFORMIN (GLUCOPHAGE) 1000 MG tablet TAKE 1 TABLET TWICE A DAY WITH MEALS (NEED APPOINTMENT FOR FURTHER REFILLS) 180 tablet 1    lidocaine 1 % injection Lidocaine HCl - 1 % Injection Solution INJECT 9 ML Intra-articular Quantity: 0 Refills: 0 Ordered: 21-Apr-2022 Nighat Kaufman Start : 21-Apr-2022 Complete LT Hip      metoprolol succinate (TOPROL XL) 100 MG extended release tablet Take 1.5 tablets by mouth daily 90 tablet 5    chlorthalidone (HYGROTON) 25 MG tablet Take 1 tablet by mouth daily 30 tablet 3    Coenzyme Q10 (CO Q 10) 100 MG CAPS Take 1 capsule by mouth daily 30 capsule 11    atorvastatin (LIPITOR) 20 MG tablet Take 1 tablet by mouth nightly 90 tablet 1    dilTIAZem (CARDIZEM CD) 120 MG extended release capsule Take 1 capsule by mouth daily 90 capsule 1    montelukast (SINGULAIR) 10 MG tablet Take 1 tablet by mouth nightly 90 tablet 2    losartan (COZAAR) 25 MG tablet Take 1 tablet by mouth daily 90 tablet 2    VENTOLIN  (90 Base) MCG/ACT inhaler INHALE 2 PUFFS EVERY 4 HOURS AS NEEDED 1 each 3    Omega-3 Fatty Acids (FISH OIL) 1200 MG CAPS Take by mouth      Multiple Vitamins-Minerals (MULTIVITAMIN & MINERAL PO) Take by mouth      aspirin 81 MG tablet Take 81 mg by mouth      loratadine (CLARITIN) 10 MG tablet Take by mouth      ipratropium (ATROVENT) 0.03 % nasal spray 2 sprays by Each Nostril route every 12 hours       No current facility-administered medications on file prior to visit. Beclomethasone, Molds & smuts, Salsalate, and Percocet [oxycodone-acetaminophen]    Review of Systems   Constitutional: Negative for chills and fever. Skin: Positive for wound. Allergic/Immunologic: Negative for environmental allergies, food allergies and immunocompromised state. Hematological: Negative for adenopathy. Does not bruise/bleed easily. Psychiatric/Behavioral: Negative for behavioral problems and sleep disturbance. Temp 98.6 °F (37 °C)   Ht 5' (1.524 m)   Wt 211 lb 3.2 oz (95.8 kg)   BMI 41.25 kg/m²   Physical Exam  Constitutional:       General: She is not in acute distress. Appearance: Normal appearance.  She is well-developed. She is not toxic-appearing. HENT:      Head: Normocephalic and atraumatic. Right Ear: Hearing and tympanic membrane normal.      Left Ear: Hearing and tympanic membrane normal.      Nose: Nose normal. No nasal deformity. Eyes:      General: Lids are normal.         Right eye: No discharge. Left eye: No discharge. Conjunctiva/sclera: Conjunctivae normal.      Pupils: Pupils are equal, round, and reactive to light. Neck:      Thyroid: No thyroid mass or thyromegaly. Vascular: No JVD. Trachea: Trachea and phonation normal.   Cardiovascular:      Rate and Rhythm: Normal rate and regular rhythm. Pulmonary:      Effort: No accessory muscle usage or respiratory distress. Musculoskeletal:      Cervical back: Full passive range of motion without pain. Comments: FROM all large muscle groups and joints as witnessed when walking to exam table, getting on, and getting off the exam table. Skin:     General: Skin is warm and dry. Findings: No rash. Comments: Center of the back upper thoracic region there is a 1 cm raised irregular excoriated lesion. Neurological:      Mental Status: She is alert. Motor: No tremor or atrophy. Gait: Gait normal.   Psychiatric:         Speech: Speech normal.         Behavior: Behavior normal.         Thought Content: Thought content normal.           PROCEDURE: Excision size 1.4 cm  Informed consent was given by the patient. Risks including infection, bleeding and scarring were discussed. No guarantee how the scar will look. Patient skin was prepped with alcohol. Wound was anesthetized using 1.0 cc of 1% lidocaine with epinephrine for anesthesia. A Dermablade was used to obtain the complete removal of the visible lesion. drysol was used at the base of site. Bacitracin ointment and bandage were placed on the wound.   Estimated blood loss less than 1 cc    Post op wound care instructions were given to the patient. He/She will f/u in 2 weeks or sooner if needed for problems. Amy Duong was seen today for skin exam and skin problem. Diagnoses and all orders for this visit:    Abnormal skin growth  -     MI SHAV SKIN LES 11-20MM REMAINDR BODY  -     Specimen to Pathology Outpatient; Future    Changing skin lesion  -     MI SHAV SKIN LES 11-20MM REMAINDR BODY  -     Specimen to Pathology Outpatient; Future    Itching  -     MI SHAV SKIN LES 11-20MM REMAINDR BODY  -     Specimen to Pathology Outpatient; Future        Return in about 1 year (around 6/30/2023) for 15 min skin check. Patient Instructions   Incision/Laceration repair  As long as current lesion only comes back as a precancerous lesion we will keep 1 year skin follow-up. However if it has become cancerous in any form we will change to every 6-month skin checks.      -Clean surgical area with antibacterial soap and water once daily. --You may be instructed to soak the wound with Hydrogen Peroxide to loosen scabbing around sutures, this is not to be done more often that every 3 days, should be for 30 seconds-1 min and then rinsed off with water.     -Keep surgical site moist with vaseline or antibiotic ointment (single- Bacitracin, not triple antibiotic or Neosporin) and apply a fresh bandage daily until a solid scab forms or if the wound is at risk for trauma or dirt. It is important to leave the wound uncovered part of the day to allow the skin to dry and avoid breakdown from excessive moisture. There may be exceptions to this, the staff will provide information if your wound requires a variation in this, that will often involve a prescription ointment or cream.     -Follow up immediately if any growing redness (minimal redness or pale pink is normal along wound edges) surrounds the surgical site or if dripping drainage occurs at surgical site. Once a solid scab forms no more bandage needed. A wet scab can look yellow.   This is not infection, just moisture.    -Once the lesion is healed be sure to apply sunscreen to the area to prevent burn of the newer and more delicate skin especially during the first 6 months of healing.        -If the scar begins to be raised you may massage the area firmly twice a day to help break down scar tissue and help the area become a flat scar. There is some evidence that Mederma applied to a scar daily for the first few months can help shrink and fade it more quickly then without intervention. Karol Velasquez M.D. This note was partially created with the assistance of dictation. This may lead to grammatical or spelling errors.

## 2022-07-27 ENCOUNTER — ANESTHESIA (OUTPATIENT)
Dept: OPERATING ROOM | Age: 67
End: 2022-07-27
Payer: MEDICARE

## 2022-07-27 ENCOUNTER — ANESTHESIA EVENT (OUTPATIENT)
Dept: OPERATING ROOM | Age: 67
End: 2022-07-27
Payer: MEDICARE

## 2022-07-27 ENCOUNTER — HOSPITAL ENCOUNTER (OUTPATIENT)
Age: 67
Setting detail: OUTPATIENT SURGERY
Discharge: HOME | End: 2022-07-27
Attending: SPECIALIST | Admitting: SPECIALIST
Payer: MEDICARE

## 2022-07-27 VITALS
WEIGHT: 209 LBS | DIASTOLIC BLOOD PRESSURE: 75 MMHG | HEIGHT: 60 IN | BODY MASS INDEX: 41.03 KG/M2 | OXYGEN SATURATION: 96 % | HEART RATE: 63 BPM | RESPIRATION RATE: 20 BRPM | TEMPERATURE: 97.3 F | SYSTOLIC BLOOD PRESSURE: 104 MMHG

## 2022-07-27 DIAGNOSIS — Z86.010 HISTORY OF COLON POLYPS: ICD-10-CM

## 2022-07-27 PROBLEM — Z86.0100 HISTORY OF COLON POLYPS: Status: ACTIVE | Noted: 2022-07-27

## 2022-07-27 LAB
GLUCOSE BLD-MCNC: 146 MG/DL (ref 70–99)
PERFORMED ON: ABNORMAL

## 2022-07-27 PROCEDURE — 2709999900 HC NON-CHARGEABLE SUPPLY: Performed by: SPECIALIST

## 2022-07-27 PROCEDURE — 45380 COLONOSCOPY AND BIOPSY: CPT | Performed by: SPECIALIST

## 2022-07-27 PROCEDURE — 3700000001 HC ADD 15 MINUTES (ANESTHESIA): Performed by: SPECIALIST

## 2022-07-27 PROCEDURE — 6370000000 HC RX 637 (ALT 250 FOR IP): Performed by: SPECIALIST

## 2022-07-27 PROCEDURE — 3700000000 HC ANESTHESIA ATTENDED CARE: Performed by: SPECIALIST

## 2022-07-27 PROCEDURE — 7100000010 HC PHASE II RECOVERY - FIRST 15 MIN: Performed by: SPECIALIST

## 2022-07-27 PROCEDURE — 7100000011 HC PHASE II RECOVERY - ADDTL 15 MIN: Performed by: SPECIALIST

## 2022-07-27 PROCEDURE — 6360000002 HC RX W HCPCS: Performed by: NURSE ANESTHETIST, CERTIFIED REGISTERED

## 2022-07-27 PROCEDURE — 2500000003 HC RX 250 WO HCPCS: Performed by: NURSE ANESTHETIST, CERTIFIED REGISTERED

## 2022-07-27 PROCEDURE — 3609027000 HC COLONOSCOPY: Performed by: SPECIALIST

## 2022-07-27 PROCEDURE — 2580000003 HC RX 258: Performed by: SPECIALIST

## 2022-07-27 PROCEDURE — 88305 TISSUE EXAM BY PATHOLOGIST: CPT

## 2022-07-27 RX ORDER — PROPOFOL 10 MG/ML
INJECTION, EMULSION INTRAVENOUS PRN
Status: DISCONTINUED | OUTPATIENT
Start: 2022-07-27 | End: 2022-07-27 | Stop reason: SDUPTHER

## 2022-07-27 RX ORDER — MAGNESIUM HYDROXIDE 1200 MG/15ML
LIQUID ORAL PRN
Status: DISCONTINUED | OUTPATIENT
Start: 2022-07-27 | End: 2022-07-27 | Stop reason: ALTCHOICE

## 2022-07-27 RX ORDER — SODIUM CHLORIDE, SODIUM LACTATE, POTASSIUM CHLORIDE, CALCIUM CHLORIDE 600; 310; 30; 20 MG/100ML; MG/100ML; MG/100ML; MG/100ML
INJECTION, SOLUTION INTRAVENOUS CONTINUOUS
Status: DISCONTINUED | OUTPATIENT
Start: 2022-07-27 | End: 2022-07-27 | Stop reason: HOSPADM

## 2022-07-27 RX ORDER — SIMETHICONE 20 MG/.3ML
EMULSION ORAL PRN
Status: DISCONTINUED | OUTPATIENT
Start: 2022-07-27 | End: 2022-07-27 | Stop reason: ALTCHOICE

## 2022-07-27 RX ORDER — LIDOCAINE HYDROCHLORIDE 10 MG/ML
INJECTION, SOLUTION INFILTRATION; PERINEURAL PRN
Status: DISCONTINUED | OUTPATIENT
Start: 2022-07-27 | End: 2022-07-27 | Stop reason: SDUPTHER

## 2022-07-27 RX ADMIN — PROPOFOL 100 MG: 10 INJECTION, EMULSION INTRAVENOUS at 07:47

## 2022-07-27 RX ADMIN — PROPOFOL 50 MG: 10 INJECTION, EMULSION INTRAVENOUS at 07:57

## 2022-07-27 RX ADMIN — LIDOCAINE HYDROCHLORIDE 50 MG: 10 INJECTION, SOLUTION INFILTRATION; PERINEURAL at 07:43

## 2022-07-27 RX ADMIN — PROPOFOL 50 MG: 10 INJECTION, EMULSION INTRAVENOUS at 07:52

## 2022-07-27 RX ADMIN — PROPOFOL 100 MG: 10 INJECTION, EMULSION INTRAVENOUS at 07:43

## 2022-07-27 NOTE — H&P
Patient Name: Selene Patiño  :   MRN: [id number]  DATE: 22      ENDOSCOPY  History and Physical    Procedure:    [x] Diagnostic Colonoscopy       [] Screening Colonoscopy  [] EGD      [] ERCP      [] EUS       [] Other    [x] Previous office notes/History and Physical reviewed from the patients chart. Please see EMR for further details of HPI. I have examined the patient's status immediately prior to the procedure and:      Indications/HPI:    []Abdominal Pain  []Cancer- GI/Lung  []Fhx of colon CA/polyps  []History of Polyps  []Hunts   []Melena  []Abnormal Imaging  []Dysphagia    []Persistent Pneumonia  []Anemia  []Food Impaction  [x]History of Polyps  []GI Bleed  []Pulmonary nodule/Mass  []Change in bowel habits []Heartburn/Reflux  []Rectal Bleed (BRBPR)  []Chest Pain - Non Cardiac []Heme (+) Stoo  l[]Ulcers  []Constipation  []Hemoptysis   []Varices  []Diarrhea  []Hypoxemia  []Nausea/Vomiting  []Screening   []Crohns/Colitis  []Other:     Anesthesia:   [x] MAC [] Moderate Sedation   [] General   [] None     ROS: 12 pt Review of Symptoms was negative unless mentioned above    Medications:   Prior to Admission medications    Medication Sig Start Date End Date Taking?  Authorizing Provider   Fexofenadine-Pseudoephedrine (ALLEGRA-D 24 HOUR PO) Take by mouth   Yes Historical Provider, MD   glimepiride (AMARYL) 2 MG tablet TAKE 1 TABLET BY MOUTH  EVERY MORNING BEFORE  BREAKFAST AND EVERY EVENING BEFORE DINNER. 22   PRASHANT Sinclair CNP   metFORMIN (GLUCOPHAGE) 1000 MG tablet TAKE 1 TABLET TWICE A DAY WITH MEALS (NEED APPOINTMENT FOR FURTHER REFILLS) 22   PRASHANT Sinclair CNP   lidocaine 1 % injection Lidocaine HCl - 1 % Injection Solution INJECT 9 ML Intra-articular Quantity: 0 Refills: 0 Ordered: 2022 [place]iam [place] Start : 2022 Complete LT Hip  Patient not taking: Reported on 2022   Historical Provider, MD   metoprolol succinate (TOPROL XL) 100 MG extended release tablet Take 1.5 tablets by mouth daily 5/25/22   Cristin Restrepo MD   chlorthalidone (HYGROTON) 25 MG tablet Take 1 tablet by mouth daily 5/25/22   Cristin Restrepo MD   Coenzyme Q10 (CO Q 10) 100 MG CAPS Take 1 capsule by mouth daily 4/26/22   PRASHANT Perez CNP   atorvastatin (LIPITOR) 20 MG tablet Take 1 tablet by mouth nightly 4/26/22   PRASHANT Perez CNP   dilTIAZem (CARDIZEM CD) 120 MG extended release capsule Take 1 capsule by mouth daily 4/26/22   PRASHANT Perez CNP   montelukast (SINGULAIR) 10 MG tablet Take 1 tablet by mouth nightly 12/2/21   PRASHANT Perez CNP   losartan (COZAAR) 25 MG tablet Take 1 tablet by mouth daily 12/2/21   PRASHANT Perez CNP   VENTOLIN  (90 Base) MCG/ACT inhaler INHALE 2 PUFFS EVERY 4 HOURS AS NEEDED  Patient not taking: Reported on 7/27/2022 12/2/21   PRASHANT Perez CNP   Omega-3 Fatty Acids (FISH OIL) 1200 MG CAPS Take by mouth    Historical Provider, MD   Multiple Vitamins-Minerals (MULTIVITAMIN & MINERAL PO) Take by mouth    Historical Provider, MD   aspirin 81 MG tablet Take 81 mg by mouth    Historical Provider, MD       Allergies:    Allergies   Allergen Reactions    Salsalate Other (See Comments)     causes pt to lose hearing    Beclomethasone     Molds & Smuts     Percocet [Oxycodone-Acetaminophen] Nausea And Vomiting        History of allergic reaction to anesthesia:  No    Past Medical History:  Past Medical History:   Diagnosis Date    Asthma     CAD (coronary artery disease)     Hypertension     Kidney stone     Sleep apnea     wears cpap    Type 2 diabetes mellitus without complication ([address]        Past Surgical History:  Past Surgical History:   Procedure Laterality Date    CARPAL TUNNEL RELEASE Bilateral 80's    carpal tunnel both wrists and right foot, POLLACK'S NEUROMA    EYE SURGERY Right 03/2017    had film removed on right eye    JOINT REPLACEMENT Left 09/22/2010    left thumb replacement KNEE SURGERY Bilateral     torn meniscus 1/16/2009, 2/12/2009, 2/2016    KNEE SURGERY Left 02/2016    OVARIAN CYST REMOVAL  05/13/2008    ROTATOR CUFF REPAIR Left 01/08/2004    ROTATOR CUFF REPAIR Right 08/22/2005    TONSILLECTOMY AND ADENOIDECTOMY  1963       Social History:  Social History     Tobacco Use    Smoking status: Never    Smokeless tobacco: Never   Vaping Use    Vaping Use: Never used   Substance Use Topics    Alcohol use: Yes     Comment: occassionally    Drug use: No       Vital Signs: There were no vitals filed for this visit. Physical Exam:  Cardiac:  [x]WNL  []Comments:  Pulmonary:  [x]WNL   []Comments:   Neuro/Mental Status:  [x]WNL  []Comments:  Abdominal:  [x]WNL    []Comments:  Other:   []WNL  []Comments:    Informed Consent:  The risks and benefits of the procedure have been discussed with either the patient or if they cannot consent, their representative. Assessment:  Patient examined and appropriate for planned sedation and procedure. Plan:  Proceed with planned sedation and procedure as above.     Jadon Tatum MD  7:25 AM

## 2022-07-27 NOTE — ANESTHESIA PRE PROCEDURE
Department of Anesthesiology  Preprocedure Note       Name:  Geno Gaming   Age:  79 y.o.  :  1955                                          MRN:  [id number]         Date:  2022      Surgeon: Munir Juarez):  Chris Rees MD    Procedure: Procedure(s):  COLORECTAL CANCER SCREENING, HIGH RISK    Medications prior to admission:   Prior to Admission medications    Medication Sig Start Date End Date Taking?  Authorizing Provider   Fexofenadine-Pseudoephedrine (ALLEGRA-D 24 HOUR PO) Take by mouth   Yes Historical Provider, MD   glimepiride (AMARYL) 2 MG tablet TAKE 1 TABLET BY MOUTH  EVERY MORNING BEFORE  BREAKFAST AND EVERY EVENING BEFORE DINNER. 22   PRASHANT Whitehead CNP   metFORMIN (GLUCOPHAGE) 1000 MG tablet TAKE 1 TABLET TWICE A DAY WITH MEALS (NEED APPOINTMENT FOR FURTHER REFILLS) 22   PRASHANT Whitehead CNP   lidocaine 1 % injection Lidocaine HCl - 1 % Injection Solution INJECT 9 ML Intra-articular Quantity: 0 Refills: 0 Ordered: 2022 Rabia Cam Start : 2022 Complete LT Hip  Patient not taking: Reported on 2022   Historical Provider, MD   metoprolol succinate (TOPROL XL) 100 MG extended release tablet Take 1.5 tablets by mouth daily 22   Allison Tobin MD   chlorthalidone (HYGROTON) 25 MG tablet Take 1 tablet by mouth daily 22   Allison Tobin MD   Coenzyme Q10 (CO Q 10) 100 MG CAPS Take 1 capsule by mouth daily 22   PRASHANT Whitehead CNP   atorvastatin (LIPITOR) 20 MG tablet Take 1 tablet by mouth nightly 22   PRASHANT Whitehead CNP   dilTIAZem (CARDIZEM CD) 120 MG extended release capsule Take 1 capsule by mouth daily 22   PRASHANT Whitehead CNP   montelukast (SINGULAIR) 10 MG tablet Take 1 tablet by mouth nightly 21   PRASHANT Whitehead CNP   losartan (COZAAR) 25 MG tablet Take 1 tablet by mouth daily 21   PRASHANT Whitehead - CNP   VENTOLIN  (90 Base) MCG/ACT inhaler INHALE 2 PUFFS had film removed on right eye    JOINT REPLACEMENT Left 09/22/2010    left thumb replacement    KNEE SURGERY Bilateral     torn meniscus 1/16/2009, 2/12/2009, 2/2016    KNEE SURGERY Left 02/2016    OVARIAN CYST REMOVAL  05/13/2008    ROTATOR CUFF REPAIR Left 01/08/2004    ROTATOR CUFF REPAIR Right 08/22/2005    TONSILLECTOMY AND ADENOIDECTOMY  1963       Social History:    Social History     Tobacco Use    Smoking status: Never    Smokeless tobacco: Never   Substance Use Topics    Alcohol use: Yes     Comment: occassionally                                Counseling given: Not Answered      Vital Signs (Current): There were no vitals filed for this visit.                                            BP Readings from Last 3 Encounters:   06/08/22 118/76   06/02/22 134/76   05/25/22 130/80       NPO Status:                                                                                 BMI:   Wt Readings from Last 3 Encounters:   06/30/22 211 lb 3.2 oz (95.8 kg)   06/08/22 215 lb (97.5 kg)   06/02/22 216 lb 3.2 oz (98.1 kg)     There is no height or weight on file to calculate BMI.    CBC:   Lab Results   Component Value Date/Time    WBC 7.6 05/31/2022 08:09 AM    RBC 4.95 05/31/2022 08:09 AM    HGB 15.0 05/31/2022 08:09 AM    HCT 44.7 05/31/2022 08:09 AM    MCV 90.3 05/31/2022 08:09 AM    RDW 15.7 05/31/2022 08:09 AM     05/31/2022 08:09 AM       CMP:   Lab Results   Component Value Date/Time     05/31/2022 08:09 AM    K 3.5 05/31/2022 08:09 AM    K 3.7 01/22/2022 07:20 AM    CL 98 05/31/2022 08:09 AM    CO2 25 05/31/2022 08:09 AM    BUN 15 05/31/2022 08:09 AM    CREATININE 0.66 05/31/2022 08:09 AM    GFRAA >60.0 05/31/2022 08:09 AM    LABGLOM >60.0 05/31/2022 08:09 AM    GLUCOSE 299 05/31/2022 08:09 AM    PROT 6.5 05/31/2022 08:09 AM    CALCIUM 9.0 05/31/2022 08:09 AM    BILITOT 0.6 05/31/2022 08:09 AM    ALKPHOS 96 05/31/2022 08:09 AM    AST 15 05/31/2022 08:09 AM    ALT 24 05/31/2022 08:09 AM POC Tests: No results for input(s): POCGLU, POCNA, POCK, POCCL, POCBUN, POCHEMO, POCHCT in the last 72 hours. Coags:   Lab Results   Component Value Date/Time    PROTIME 12.6 09/22/2019 03:32 PM    INR 0.9 09/22/2019 03:32 PM    APTT 28.6 09/22/2019 03:32 PM       HCG (If Applicable): No results found for: PREGTESTUR, PREGSERUM, HCG, HCGQUANT     ABGs: No results found for: PHART, PO2ART, PWA6ELS, EPN0ZEM, BEART, A9YFURQP     Type & Screen (If Applicable):  No results found for: LABABO, LABRH    Drug/Infectious Status (If Applicable):  No results found for: HIV, HEPCAB    COVID-19 Screening (If Applicable):   Lab Results   Component Value Date/Time    COVID19 Not Detected 01/21/2022 02:43 AM           Anesthesia Evaluation    Airway: Mallampati: III          Dental: normal exam         Pulmonary: breath sounds clear to auscultation  (+) sleep apnea: on CPAP,  asthma:                            Cardiovascular:    (+) hypertension: no interval change, CAD: no interval change,         Rhythm: regular                      Neuro/Psych:   (+) neuromuscular disease:,             GI/Hepatic/Renal: Neg GI/Hepatic/Renal ROS            Endo/Other:    (+) DiabetesType II DM, no interval change, , .                 Abdominal:   (+) obese,     Abdomen: soft. Vascular: negative vascular ROS. Other Findings:           Anesthesia Plan      MAC     ASA 3       Induction: intravenous. Anesthetic plan and risks discussed with patient. Plan discussed with attending.                     PRASHANT Adkins - BUBBA   7/27/2022

## 2022-07-27 NOTE — ANESTHESIA POSTPROCEDURE EVALUATION
Department of Anesthesiology  Postprocedure Note    Patient: Buzz Medeiros  MRN: [id number]  YOB: 1955  Date of evaluation: 7/27/2022      Procedure Summary     Date: 07/27/22 Room / Location: [address] [place] [address]    Anesthesia Start: 0740 Anesthesia Stop:     Procedure: [address], HIGH RISK (Anus) Diagnosis:       History of colon polyps      (History of colon polyps [Z86.010])    Surgeons: Juany Rubio MD Responsible Provider: PRASHANT Ty CRNA    Anesthesia Type: MAC ASA Status: 3          Anesthesia Type: No value filed.     Harley Phase I: Harley Score: 10    Harley Phase II:        Anesthesia Post Evaluation    Patient location during evaluation: PACU  Patient participation: complete - patient participated  Level of consciousness: awake  Pain score: 0  Airway patency: patent  Nausea & Vomiting: no nausea  Complications: no  Cardiovascular status: hemodynamically stable  Respiratory status: acceptable  Hydration status: stable

## 2022-07-27 NOTE — DISCHARGE INSTRUCTIONS
bleeding is not serious especially if hemorrhoids are present. Unable to keep down food or drink. IV site stays red and swollen for more than 2 days. In the case of an emergency, please go to the emergency room. If you are not having an emergency but are having some of the above symptoms please call the doctor's office at:  Dr. Andrey Martinez [phone]   [email or web address]      I have read and understand the above instructions:            ____________________________         ____________________________  Patient or Patient Rep. Signature   Witness Signature      Date: 7/27/2022  Time:

## 2022-07-27 NOTE — PROGRESS NOTES
Patient ID:  John Timmons  [id number]  3 y.o.  1955    8:03 AM Patient received in  PACU 2 Report received from Anesthesia and Surgical Nurse. Attached to Monitor, VSS, See Nursing Assessment and Flowsheets for detailed Information    8:11 AM Awake, following commands, answering questions appropriately. O2 Sats>92 on Room air. 8:16 AM Taking po fluids well, VSS.V Discontinued per protocol, catheter intact, patient tolerated well. Discharge Instructions given, patient verbalizes an understanding. 8:29 AM VSS.  Discharged to home per w/c in apparent Stable Condition    Electronically signed by Socorro Tate RN on 7/27/22

## 2022-07-31 ENCOUNTER — OFFICE VISIT (OUTPATIENT)
Dept: FAMILY MEDICINE CLINIC | Age: 67
End: 2022-07-31
Payer: MEDICARE

## 2022-07-31 VITALS
SYSTOLIC BLOOD PRESSURE: 112 MMHG | HEIGHT: 60 IN | WEIGHT: 204.4 LBS | OXYGEN SATURATION: 98 % | DIASTOLIC BLOOD PRESSURE: 62 MMHG | HEART RATE: 63 BPM | TEMPERATURE: 97 F | BODY MASS INDEX: 40.13 KG/M2

## 2022-07-31 DIAGNOSIS — M70.72 BURSITIS OF LEFT HIP, UNSPECIFIED BURSA: Primary | ICD-10-CM

## 2022-07-31 DIAGNOSIS — M54.50 BILATERAL LOW BACK PAIN, UNSPECIFIED CHRONICITY, UNSPECIFIED WHETHER SCIATICA PRESENT: ICD-10-CM

## 2022-07-31 PROCEDURE — G8417 CALC BMI ABV UP PARAM F/U: HCPCS | Performed by: NURSE PRACTITIONER

## 2022-07-31 PROCEDURE — 1036F TOBACCO NON-USER: CPT | Performed by: NURSE PRACTITIONER

## 2022-07-31 PROCEDURE — 1123F ACP DISCUSS/DSCN MKR DOCD: CPT | Performed by: NURSE PRACTITIONER

## 2022-07-31 PROCEDURE — G8427 DOCREV CUR MEDS BY ELIG CLIN: HCPCS | Performed by: NURSE PRACTITIONER

## 2022-07-31 PROCEDURE — G8399 PT W/DXA RESULTS DOCUMENT: HCPCS | Performed by: NURSE PRACTITIONER

## 2022-07-31 PROCEDURE — 99213 OFFICE O/P EST LOW 20 MIN: CPT | Performed by: NURSE PRACTITIONER

## 2022-07-31 PROCEDURE — 96372 THER/PROPH/DIAG INJ SC/IM: CPT | Performed by: NURSE PRACTITIONER

## 2022-07-31 PROCEDURE — 1090F PRES/ABSN URINE INCON ASSESS: CPT | Performed by: NURSE PRACTITIONER

## 2022-07-31 PROCEDURE — 3017F COLORECTAL CA SCREEN DOC REV: CPT | Performed by: NURSE PRACTITIONER

## 2022-07-31 RX ORDER — TRIAMCINOLONE ACETONIDE 40 MG/ML
80 INJECTION, SUSPENSION INTRA-ARTICULAR; INTRAMUSCULAR ONCE
Status: COMPLETED | OUTPATIENT
Start: 2022-07-31 | End: 2022-07-31

## 2022-07-31 RX ORDER — IBUPROFEN 600 MG/1
600 TABLET ORAL 4 TIMES DAILY PRN
Qty: 120 TABLET | Refills: 1 | Status: SHIPPED | OUTPATIENT
Start: 2022-07-31

## 2022-07-31 RX ADMIN — TRIAMCINOLONE ACETONIDE 80 MG: 40 INJECTION, SUSPENSION INTRA-ARTICULAR; INTRAMUSCULAR at 16:02

## 2022-07-31 ASSESSMENT — ENCOUNTER SYMPTOMS
COUGH: 0
SHORTNESS OF BREATH: 0

## 2022-07-31 NOTE — PROGRESS NOTES
nevin  Subjective:      Patient ID: Alexsandra Foy is a 79 y.o. female who presents today for:     Chief Complaint   Patient presents with    Hip Pain     Pt presents today with left hip pain pt states yesterday 7/30 the pain started pt took Advil for pain. Hip Pain   The incident occurred more than 1 week ago. The incident occurred at home. There was no injury mechanism. Pain location: left hip. The quality of the pain is described as aching. Pertinent negatives include no inability to bear weight, loss of motion, loss of sensation, muscle weakness, numbness or tingling. She reports no foreign bodies present. The symptoms are aggravated by movement and weight bearing. She has tried ice for the symptoms. The treatment provided no relief. Aching for a week severely worsened this week. Hx of hip bursitis in left hip in April.     Past Medical History:   Diagnosis Date    Asthma     CAD (coronary artery disease)     Hypertension     Kidney stone     Sleep apnea     wears cpap    Type 2 diabetes mellitus without complication (HCC)      Past Surgical History:   Procedure Laterality Date    CARPAL TUNNEL RELEASE Bilateral 80's    carpal tunnel both wrists and right foot, POLLACK'S NEUROMA    COLONOSCOPY N/A 7/27/2022    COLORECTAL CANCER SCREENING, HIGH RISK performed by Sonam Walker MD at [facility] Right 03/2017    had film removed on right eye    JOINT REPLACEMENT Left 09/22/2010    left thumb replacement    KNEE SURGERY Bilateral     torn meniscus 1/16/2009, 2/12/2009, 2/2016    KNEE SURGERY Left 02/2016    OVARIAN CYST REMOVAL  05/13/2008    ROTATOR CUFF REPAIR Left 01/08/2004    ROTATOR CUFF REPAIR Right 08/22/2005    TONSILLECTOMY AND ADENOIDECTOMY  1963     Family History   Problem Relation Age of Onset    Lung Cancer Mother     Diabetes Mother     Hypertension Mother     Diabetes Father     Hypertension Father     Hypertension Brother     Diabetes Brother     Breast Cancer Maternal Aunt Social History     Socioeconomic History    Marital status: Single     Spouse name: Not on file    Number of children: Not on file    Years of education: Not on file    Highest education level: Not on file   Occupational History    Not on file   Tobacco Use    Smoking status: Never    Smokeless tobacco: Never   Vaping Use    Vaping Use: Never used   Substance and Sexual Activity    Alcohol use: Yes     Comment: occassionally    Drug use: No    Sexual activity: Not Currently   Other Topics Concern    Not on file   Social History Narrative    Not on file     Social Determinants of Health     Financial Resource Strain: Low Risk     Difficulty of Paying Living Expenses: Not hard at all   Food Insecurity: No Food Insecurity    Worried About Running Out of Food in the Last Year: Never true    Ran Out of Food in the Last Year: Never true   Transportation Needs: Not on file   Physical Activity: Unknown    Days of Exercise per Week: 5 days    Minutes of Exercise per Session: Patient refused   Stress: Not on file   Social Connections: Not on file   Intimate Partner Violence: Not on file   Housing Stability: Not on file     Current Outpatient Medications on File Prior to Visit   Medication Sig Dispense Refill    Fexofenadine-Pseudoephedrine (ALLEGRA-D 24 HOUR PO) Take by mouth      glimepiride (AMARYL) 2 MG tablet TAKE 1 TABLET BY MOUTH  EVERY MORNING BEFORE  BREAKFAST AND EVERY EVENING BEFORE DINNER. 180 tablet 2    metFORMIN (GLUCOPHAGE) 1000 MG tablet TAKE 1 TABLET TWICE A DAY WITH MEALS (NEED APPOINTMENT FOR FURTHER REFILLS) 180 tablet 1    lidocaine 1 % injection       metoprolol succinate (TOPROL XL) 100 MG extended release tablet Take 1.5 tablets by mouth daily 90 tablet 5    chlorthalidone (HYGROTON) 25 MG tablet Take 1 tablet by mouth daily 30 tablet 3    Coenzyme Q10 (CO Q 10) 100 MG CAPS Take 1 capsule by mouth daily 30 capsule 11    atorvastatin (LIPITOR) 20 MG tablet Take 1 tablet by mouth nightly 90 tablet 1 dilTIAZem (CARDIZEM CD) 120 MG extended release capsule Take 1 capsule by mouth daily 90 capsule 1    montelukast (SINGULAIR) 10 MG tablet Take 1 tablet by mouth nightly 90 tablet 2    losartan (COZAAR) 25 MG tablet Take 1 tablet by mouth daily 90 tablet 2    VENTOLIN  (90 Base) MCG/ACT inhaler INHALE 2 PUFFS EVERY 4 HOURS AS NEEDED 1 each 3    Omega-3 Fatty Acids (FISH OIL) 1200 MG CAPS Take by mouth      Multiple Vitamins-Minerals (MULTIVITAMIN & MINERAL PO) Take by mouth      aspirin 81 MG tablet Take 81 mg by mouth       No current facility-administered medications on file prior to visit. Allergies:  Salsalate, Molds & smuts, and Percocet [oxycodone-acetaminophen]    Review of Systems   Constitutional:  Negative for chills, fatigue and fever. HENT:  Negative for congestion. Respiratory:  Negative for cough and shortness of breath. Musculoskeletal:  Positive for arthralgias, gait problem and myalgias. Neurological:  Negative for tingling and numbness. Objective:   /62 (Site: Right Upper Arm, Position: Sitting, Cuff Size: Large Adult)   Pulse 63   Temp 97 °F (36.1 °C) (Temporal)   Ht 5' (1.524 m)   Wt 204 lb 6.4 oz (92.7 kg)   SpO2 98%   BMI 39.92 kg/m²     Physical Exam  Constitutional:       Appearance: She is well-developed. HENT:      Head: Normocephalic. Right Ear: External ear normal.      Left Ear: External ear normal.      Nose: Nose normal.      Mouth/Throat:      Mouth: Mucous membranes are moist.      Pharynx: Oropharynx is clear. Eyes:      General:         Right eye: No discharge. Left eye: No discharge. Conjunctiva/sclera: Conjunctivae normal.   Cardiovascular:      Rate and Rhythm: Normal rate. Pulmonary:      Effort: Pulmonary effort is normal. No respiratory distress. Musculoskeletal:      Lumbar back: Spasms and tenderness present. No swelling, deformity or bony tenderness. No scoliosis. Left hip: Tenderness present.  No deformity, lacerations, bony tenderness or crepitus. Decreased range of motion. Skin:     General: Skin is warm and dry. Neurological:      Mental Status: She is alert and oriented to person, place, and time. Psychiatric:         Mood and Affect: Mood normal.         Behavior: Behavior normal.       Assessment:          Diagnosis Orders   1. Bursitis of left hip, unspecified bursa  triamcinolone acetonide (KENALOG-40) injection 80 mg    ibuprofen (ADVIL;MOTRIN) 600 MG tablet      2. Bilateral low back pain, unspecified chronicity, unspecified whether sciatica present  ibuprofen (ADVIL;MOTRIN) 600 MG tablet          Plan:      No orders of the defined types were placed in this encounter. Orders Placed This Encounter   Medications    triamcinolone acetonide (KENALOG-40) injection 80 mg    ibuprofen (ADVIL;MOTRIN) 600 MG tablet     Sig: Take 1 tablet by mouth 4 times daily as needed for Pain     Dispense:  120 tablet     Refill:  1       Return for as scheduled. 1. Bursitis of left hip, unspecified bursa  Discussed that I do not do joint injections in to the hip joint, but we can do a kenalog IM injection. She was agreeable. F/u with ortho if not improving.   - triamcinolone acetonide (KENALOG-40) injection 80 mg  - ibuprofen (ADVIL;MOTRIN) 600 MG tablet; Take 1 tablet by mouth 4 times daily as needed for Pain  Dispense: 120 tablet; Refill: 1    2. Bilateral low back pain, unspecified chronicity, unspecified whether sciatica present    - ibuprofen (ADVIL;MOTRIN) 600 MG tablet; Take 1 tablet by mouth 4 times daily as needed for Pain  Dispense: 120 tablet; Refill: 1    Reviewed with the patient: current clinicalstatus, medications, activities and diet. Side effects, adverse effects of the medication prescribedtoday, as well as treatment plan/ rationale and result expectations have been discussedwith the patient who expresses understanding and desires to proceed.     Close follow upto evaluate treatment results and for coordination of care. I have reviewedthe patient's medical history in detail and updated the computerized patient record.     [place] Cancer, APRN - CNP

## 2022-08-03 ENCOUNTER — HOSPITAL ENCOUNTER (OUTPATIENT)
Dept: SLEEP CENTER | Age: 67
Discharge: HOME | End: 2022-08-05
Payer: MEDICARE

## 2022-08-03 PROCEDURE — 95810 POLYSOM 6/> YRS 4/> PARAM: CPT

## 2022-08-04 PROCEDURE — 95810 POLYSOM 6/> YRS 4/> PARAM: CPT | Performed by: INTERNAL MEDICINE

## 2022-08-15 ENCOUNTER — OFFICE VISIT (OUTPATIENT)
Dept: PULMONOLOGY | Age: 67
End: 2022-08-15
Payer: MEDICARE

## 2022-08-15 VITALS
OXYGEN SATURATION: 98 % | DIASTOLIC BLOOD PRESSURE: 68 MMHG | WEIGHT: 204 LBS | HEART RATE: 76 BPM | SYSTOLIC BLOOD PRESSURE: 116 MMHG | HEIGHT: 60 IN | BODY MASS INDEX: 40.05 KG/M2 | TEMPERATURE: 97.6 F

## 2022-08-15 DIAGNOSIS — Z99.89 OSA ON CPAP: ICD-10-CM

## 2022-08-15 DIAGNOSIS — G47.33 OSA (OBSTRUCTIVE SLEEP APNEA): ICD-10-CM

## 2022-08-15 DIAGNOSIS — E66.9 OBESITY, UNSPECIFIED CLASSIFICATION, UNSPECIFIED OBESITY TYPE, UNSPECIFIED WHETHER SERIOUS COMORBIDITY PRESENT: ICD-10-CM

## 2022-08-15 DIAGNOSIS — R06.02 SHORTNESS OF BREATH: Primary | ICD-10-CM

## 2022-08-15 DIAGNOSIS — G47.33 OSA ON CPAP: ICD-10-CM

## 2022-08-15 DIAGNOSIS — J45.909 UNCOMPLICATED ASTHMA, UNSPECIFIED ASTHMA SEVERITY, UNSPECIFIED WHETHER PERSISTENT: ICD-10-CM

## 2022-08-15 PROCEDURE — 1090F PRES/ABSN URINE INCON ASSESS: CPT | Performed by: INTERNAL MEDICINE

## 2022-08-15 PROCEDURE — G8399 PT W/DXA RESULTS DOCUMENT: HCPCS | Performed by: INTERNAL MEDICINE

## 2022-08-15 PROCEDURE — 3017F COLORECTAL CA SCREEN DOC REV: CPT | Performed by: INTERNAL MEDICINE

## 2022-08-15 PROCEDURE — G8427 DOCREV CUR MEDS BY ELIG CLIN: HCPCS | Performed by: INTERNAL MEDICINE

## 2022-08-15 PROCEDURE — 1123F ACP DISCUSS/DSCN MKR DOCD: CPT | Performed by: INTERNAL MEDICINE

## 2022-08-15 PROCEDURE — 1036F TOBACCO NON-USER: CPT | Performed by: INTERNAL MEDICINE

## 2022-08-15 PROCEDURE — 99213 OFFICE O/P EST LOW 20 MIN: CPT | Performed by: INTERNAL MEDICINE

## 2022-08-15 PROCEDURE — G8417 CALC BMI ABV UP PARAM F/U: HCPCS | Performed by: INTERNAL MEDICINE

## 2022-08-15 NOTE — PROGRESS NOTES
PATIENT VISIT-PULMONARY/SLEEP    8/15/2022     REFERRING PHYSICIAN:  PRASHANT Gastelum - [facility]     REASON FOR REFERRAL:  LYNNE    HPI:     Jus Calvillo is a 79 y.o. female who was referred to pulmonary and sleep clinic for evaluation. She has been diagnosed with LYNNE in 2009 and has been on CPAP since then. Does not know her pressure but has been compliant. Has been using the same machine since then. Her weight is essentially about the same. She benefits from the machine. She cannot sleep without it. She has been noticing that the machine is making some noises lately. The machine is 15years old. She has been ordering her supplies from [employer]. Has minimal shortness of breath with activities. Denies any cough or wheezing. Has been diagnosed with asthma in the past.  Had maintenance inhalers in the past but currently only on Singulair. Has not been using her rescue inhalers. 8/15/22:    She comes back for follow-up. She underwent a diagnostic sleep study which showed moderate obstructive sleep apnea. However, respiratory events were severe and REM. REM AHI was 43. She had mild desaturations. She had poor sleep efficiency. Respiratory status has been good overall. Continues to be on Singulair.      Past Medical History   Past Medical History:   Diagnosis Date    Asthma     CAD (coronary artery disease)     Hypertension     Kidney stone     Sleep apnea     wears cpap    Type 2 diabetes mellitus without complication (HCC)        Past Surgical History  Past Surgical History:   Procedure Laterality Date    CARPAL TUNNEL RELEASE Bilateral 80's    carpal tunnel both wrists and right foot, POLLACK'S NEUROMA    COLONOSCOPY N/A 7/27/2022    COLORECTAL CANCER SCREENING, HIGH RISK performed by Brady Renteria MD at [facility] Right 03/2017    had film removed on right eye    JOINT REPLACEMENT Left 09/22/2010    left thumb replacement    KNEE SURGERY Bilateral     torn meniscus 1/16/2009, 2/12/2009, 2/2016    KNEE SURGERY Left 02/2016    OVARIAN CYST REMOVAL  05/13/2008    ROTATOR CUFF REPAIR Left 01/08/2004    ROTATOR CUFF REPAIR Right 08/22/2005    TONSILLECTOMY AND ADENOIDECTOMY  1963       Allergies  Allergies   Allergen Reactions    Salsalate Other (See Comments)     causes pt to lose hearing    Molds & Smuts      Causes sneezing and asthma    Percocet [Oxycodone-Acetaminophen] Nausea And Vomiting       Medications  Current Outpatient Medications   Medication Sig Dispense Refill    ibuprofen (ADVIL;MOTRIN) 600 MG tablet Take 1 tablet by mouth 4 times daily as needed for Pain 120 tablet 1    Fexofenadine-Pseudoephedrine (ALLEGRA-D 24 HOUR PO) Take by mouth      glimepiride (AMARYL) 2 MG tablet TAKE 1 TABLET BY MOUTH  EVERY MORNING BEFORE  BREAKFAST AND EVERY EVENING BEFORE DINNER. 180 tablet 2    metFORMIN (GLUCOPHAGE) 1000 MG tablet TAKE 1 TABLET TWICE A DAY WITH MEALS (NEED APPOINTMENT FOR FURTHER REFILLS) 180 tablet 1    lidocaine 1 % injection       metoprolol succinate (TOPROL XL) 100 MG extended release tablet Take 1.5 tablets by mouth daily 90 tablet 5    chlorthalidone (HYGROTON) 25 MG tablet Take 1 tablet by mouth daily 30 tablet 3    Coenzyme Q10 (CO Q 10) 100 MG CAPS Take 1 capsule by mouth daily 30 capsule 11    atorvastatin (LIPITOR) 20 MG tablet Take 1 tablet by mouth nightly 90 tablet 1    dilTIAZem (CARDIZEM CD) 120 MG extended release capsule Take 1 capsule by mouth daily 90 capsule 1    montelukast (SINGULAIR) 10 MG tablet Take 1 tablet by mouth nightly 90 tablet 2    losartan (COZAAR) 25 MG tablet Take 1 tablet by mouth daily 90 tablet 2    VENTOLIN  (90 Base) MCG/ACT inhaler INHALE 2 PUFFS EVERY 4 HOURS AS NEEDED 1 each 3    Omega-3 Fatty Acids (FISH OIL) 1200 MG CAPS Take by mouth      Multiple Vitamins-Minerals (MULTIVITAMIN & MINERAL PO) Take by mouth      aspirin 81 MG tablet Take 81 mg by mouth       No current facility-administered medications for this visit. Social History  Social History     Tobacco Use    Smoking status: Never    Smokeless tobacco: Never   Substance Use Topics    Alcohol use: Yes     Comment: occassionally       Family History  Family History   Problem Relation Age of Onset    Lung Cancer Mother     Diabetes Mother     Hypertension Mother     Diabetes Father     Hypertension Father     Hypertension Brother     Diabetes Brother     Breast Cancer Maternal Aunt        Review of Systems  All review of systems has been obtained and negative other than what was mentionedin HPI. Physical Exam                 Vitals:    08/15/22 0955   BP: 116/68   Pulse: 76   Temp: 97.6 °F (36.4 °C)   TempSrc: Tympanic   SpO2: 98%   Weight: 204 lb (92.5 kg)   Height: 5' (1.524 m)          General appearance: Well appearing. No acute distress. AAOX3  Head: Normocephalic, without obvious abnormality, atraumatic   Eyes:Pupils bilateral equal and reactive, EOM intact. Normal sclera and conjunctiva   Nose: Mucosa pink  Throat: Clear,  [place] 3  Neck: Supple, No JVD. Nothyromegaly. Neck is thick  Lungs: Clear bilaterally. No wheezing. No crackles. No use of accessory muscles. Heart: RRR, S1, S2 normal, no murmur, click, rub or gallop   Abdomen: soft, non-tender, nondistended. Bowel sounds normal. No hernia. No organomegaly. Extremities: extremities normal, atraumatic, no cyanosis, no edema  Skin: Skin color, texture, turgor normal. No rashes or lesions   Neurological: No focal deficits,cranial nerves grossly intact. No weakness. Sensation normal   Psych: Normal Mood  Musculoskeletal: No joint abnormalities. Impression:   Diagnosis Orders   1. Shortness of breath        2. Uncomplicated asthma, unspecified asthma severity, unspecified whether persistent        3. LYNNE (obstructive sleep apnea)  Sleep Study with PAP Titration      4.  Obesity, unspecified classification, unspecified obesity type, unspecified whether serious comorbidity present Recommendations:     -I went over the results of her sleep study in details and answered all her questions.  -She will need CPAP titration study. She was advised to take melatonin prior to improve her sleep efficiency.  -Continue Singulair for now. - Weight loss and exercise has been advised.  -No driving if sleepy or drowsy or otherwise impaired. Return in about 4 months (around 12/15/2022).           Electronically signed by Trey Victoria MD on 8/15/2022 at 10:10 AM

## 2022-08-17 ENCOUNTER — NURSE TRIAGE (OUTPATIENT)
Dept: OTHER | Facility: CLINIC | Age: 67
End: 2022-08-17

## 2022-08-17 ENCOUNTER — OFFICE VISIT (OUTPATIENT)
Dept: FAMILY MEDICINE CLINIC | Age: 67
End: 2022-08-17
Payer: MEDICARE

## 2022-08-17 VITALS
HEART RATE: 81 BPM | HEIGHT: 60 IN | SYSTOLIC BLOOD PRESSURE: 120 MMHG | DIASTOLIC BLOOD PRESSURE: 70 MMHG | WEIGHT: 203.2 LBS | BODY MASS INDEX: 39.89 KG/M2 | OXYGEN SATURATION: 98 %

## 2022-08-17 DIAGNOSIS — H81.10 BENIGN PAROXYSMAL POSITIONAL VERTIGO, UNSPECIFIED LATERALITY: Primary | ICD-10-CM

## 2022-08-17 PROCEDURE — 1036F TOBACCO NON-USER: CPT | Performed by: NURSE PRACTITIONER

## 2022-08-17 PROCEDURE — G8399 PT W/DXA RESULTS DOCUMENT: HCPCS | Performed by: NURSE PRACTITIONER

## 2022-08-17 PROCEDURE — 3017F COLORECTAL CA SCREEN DOC REV: CPT | Performed by: NURSE PRACTITIONER

## 2022-08-17 PROCEDURE — 99213 OFFICE O/P EST LOW 20 MIN: CPT | Performed by: NURSE PRACTITIONER

## 2022-08-17 PROCEDURE — G8417 CALC BMI ABV UP PARAM F/U: HCPCS | Performed by: NURSE PRACTITIONER

## 2022-08-17 PROCEDURE — 1090F PRES/ABSN URINE INCON ASSESS: CPT | Performed by: NURSE PRACTITIONER

## 2022-08-17 PROCEDURE — 1123F ACP DISCUSS/DSCN MKR DOCD: CPT | Performed by: NURSE PRACTITIONER

## 2022-08-17 PROCEDURE — G8427 DOCREV CUR MEDS BY ELIG CLIN: HCPCS | Performed by: NURSE PRACTITIONER

## 2022-08-17 RX ORDER — MECLIZINE HYDROCHLORIDE 25 MG/1
25 TABLET ORAL 3 TIMES DAILY PRN
Qty: 30 TABLET | Refills: 0 | Status: SHIPPED | OUTPATIENT
Start: 2022-08-17 | End: 2022-08-27

## 2022-08-17 ASSESSMENT — ENCOUNTER SYMPTOMS
CHEST TIGHTNESS: 0
TROUBLE SWALLOWING: 0
SINUS PRESSURE: 0
SHORTNESS OF BREATH: 0
SINUS PAIN: 0
RHINORRHEA: 0
CHANGE IN BOWEL HABIT: 0
VISUAL CHANGE: 0
SORE THROAT: 0
COUGH: 0
ABDOMINAL PAIN: 0
COLOR CHANGE: 0
DIARRHEA: 0
VOMITING: 1
SWOLLEN GLANDS: 0
NAUSEA: 0
WHEEZING: 0

## 2022-08-17 NOTE — TELEPHONE ENCOUNTER
REceived call from Cookie at [facility] with [employer]    Subjective: Caller states \"I am having severe vertigo with nausea and vomiting. \"     Current Symptoms: intermittent vertigo- like looking through kalidoscope.,  Loses balance, spinning sensation. Not syncopal. Standing up, laying down to sitting or when turning over in bed. Nausea/vomited(yesterday). Generalized weakness       Had visual migraine last night. Denies headache, ear pain, runny nose. HR , O2 sats 97 %      Onset: Between Monday night and Tuesday morning. waxing and waning    Associated Symptoms: reduced activity, reduced appetite    Pain Severity:  chronic back pain /10; ;     Temperature:  97.2by forehead thermometer    What has been tried: nothing    LMP: NA Pregnant: NA    Recommended disposition: Go to [facility]    Care advice provided, patient verbalizes understanding; denies any other questions or concerns; instructed to call back for any new or worsening symptoms. Patient/Caller agrees with recommended disposition; [facility] for appointment scheduling     Attention Provider: Thank you for allowing me to participate in the care of your patient. The patient was connected to triage in response to information provided to the ECC/PSC. Please do not respond through this encounter as the response is not directed to a shared pool.         Reason for Disposition   Spinning or tilting sensation (vertigo) present now    Protocols used: Dizziness-ADULT-[place]

## 2022-08-17 NOTE — PROGRESS NOTES
[address] Encounter      [address] [facility]       Chief Complaint   Patient presents with    Dizziness     Has been on going for a couple weeks now, just on 8/15 was a very bad spell questioning a stroke, was not feeling well laid down and felt like the world was spinning, today is not as bad as yesterday. Laid down all day yesterday. Migraine     Right side migraine     Ear Fullness     Left ear is very full. Nurses Notes reviewed and I agree except as noted in the HPI. HISTORY OF PRESENT ILLNESS   Chilo Bean is a 79 y.o. female who presents   Dizziness  This is a new (started as mild dizziness, monday night and tuesday morning had intense vertigo with changing positions, caused vomiting teusdat monring) problem. The current episode started 1 to 4 weeks ago (worse this week). The problem occurs daily. The problem has been gradually worsening. Associated symptoms include headaches, vertigo and vomiting. Pertinent negatives include no abdominal pain, anorexia, arthralgias, change in bowel habit, chest pain, chills, congestion, coughing, diaphoresis, fatigue, fever, joint swelling, myalgias, nausea, neck pain, numbness, rash, sore throat, swollen glands, urinary symptoms, visual change or weakness. The symptoms are aggravated by bending and twisting. She has tried rest, relaxation, sleep and position changes for the symptoms. The treatment provided mild relief. REVIEW OF SYSTEMS     Review of Systems   Constitutional:  Negative for activity change, appetite change, chills, diaphoresis, fatigue and fever. HENT:  Negative for congestion, ear pain, postnasal drip, rhinorrhea, sinus pressure, sinus pain, sore throat and trouble swallowing. Eyes:  Negative for visual disturbance. Respiratory:  Negative for cough, chest tightness, shortness of breath and wheezing. Cardiovascular:  Negative for chest pain and palpitations.    Gastrointestinal:  Positive for vomiting. Negative for abdominal pain, anorexia, change in bowel habit, diarrhea and nausea. Genitourinary:  Negative for difficulty urinating, dysuria, flank pain, frequency and urgency. Musculoskeletal:  Negative for arthralgias, joint swelling, myalgias and neck pain. Skin:  Negative for color change and rash. Neurological:  Positive for dizziness, vertigo, light-headedness and headaches. Negative for tremors, seizures, syncope, speech difficulty, weakness and numbness. PAST MEDICAL HISTORY         Diagnosis Date    Asthma     CAD (coronary artery disease)     Hypertension     Kidney stone     Sleep apnea     wears cpap    Type 2 diabetes mellitus without complication [facility])        SURGICAL HISTORY     Patient  has a past surgical history that includes Tonsillectomy and adenoidectomy (1963); Carpal tunnel release (Bilateral, 80's); Rotator cuff repair (Left, 01/08/2004); Rotator cuff repair (Right, 08/22/2005); ovarian cyst removal (05/13/2008); knee surgery (Bilateral); joint replacement (Left, 09/22/2010); knee surgery (Left, 02/2016); Eye surgery (Right, 03/2017); and Colonoscopy (N/A, 7/27/2022). CURRENT MEDICATIONS       Previous Medications    ASPIRIN 81 MG TABLET    Take 81 mg by mouth    ATORVASTATIN (LIPITOR) 20 MG TABLET    Take 1 tablet by mouth nightly    CHLORTHALIDONE (HYGROTON) 25 MG TABLET    Take 1 tablet by mouth daily    COENZYME Q10 (CO Q 10) 100 MG CAPS    Take 1 capsule by mouth daily    DILTIAZEM (CARDIZEM CD) 120 MG EXTENDED RELEASE CAPSULE    Take 1 capsule by mouth daily    FEXOFENADINE-PSEUDOEPHEDRINE (ALLEGRA-D 24 HOUR PO)    Take by mouth    GLIMEPIRIDE (AMARYL) 2 MG TABLET    TAKE 1 TABLET BY MOUTH  EVERY MORNING BEFORE  BREAKFAST AND EVERY EVENING BEFORE DINNER.     IBUPROFEN (ADVIL;MOTRIN) 600 MG TABLET    Take 1 tablet by mouth 4 times daily as needed for Pain    LIDOCAINE 1 % INJECTION        LOSARTAN (COZAAR) 25 MG TABLET    Take 1 tablet by mouth daily    METFORMIN (GLUCOPHAGE) 1000 MG TABLET    TAKE 1 TABLET TWICE A DAY WITH MEALS (NEED APPOINTMENT FOR FURTHER REFILLS)    METOPROLOL SUCCINATE (TOPROL XL) 100 MG EXTENDED RELEASE TABLET    Take 1.5 tablets by mouth daily    MONTELUKAST (SINGULAIR) 10 MG TABLET    Take 1 tablet by mouth nightly    MULTIPLE VITAMINS-MINERALS (MULTIVITAMIN & MINERAL PO)    Take by mouth    OMEGA-3 FATTY ACIDS (FISH OIL) 1200 MG CAPS    Take by mouth    VENTOLIN  (90 BASE) MCG/ACT INHALER    INHALE 2 PUFFS EVERY 4 HOURS AS NEEDED       ALLERGIES     Patientis is allergic to salsalate, molds & smuts, and percocet [oxycodone-acetaminophen]. FAMILY HISTORY     Patient's family history includes Breast Cancer in her maternal aunt; Diabetes in her brother, father, and mother; Hypertension in her brother, father, and mother; Ines Davis in her mother. SOCIAL HISTORY     Patient  reports that she has never smoked. She has never used smokeless tobacco. She reports current alcohol use. She reports that she does not use drugs. PHYSICAL EXAM     ED TRIAGE VITALS  BP: 120/70,  , Heart Rate: 81,  , SpO2: 98 %  Physical Exam  Constitutional:       General: She is not in acute distress. Appearance: Normal appearance. She is normal weight. She is not ill-appearing, toxic-appearing or diaphoretic. HENT:      Head: Normocephalic and atraumatic. Right Ear: Hearing, tympanic membrane, ear canal and external ear normal. There is no impacted cerumen. Left Ear: Hearing, tympanic membrane, ear canal and external ear normal. There is impacted cerumen. Ears:      Comments: Small cerumen impaction left canal, gently flushed out easily, symptoms greatly improved     Nose: Nose normal. No congestion or rhinorrhea. Mouth/Throat:      Mouth: Mucous membranes are moist.      Pharynx: Oropharynx is clear. No oropharyngeal exudate or posterior oropharyngeal erythema. Eyes:      General:         Right eye: No discharge.          Left eye: No discharge. Conjunctiva/sclera: Conjunctivae normal.      Pupils: Pupils are equal, round, and reactive to light. Cardiovascular:      Rate and Rhythm: Normal rate and regular rhythm. Pulses: Normal pulses. Pulmonary:      Effort: Pulmonary effort is normal.      Breath sounds: Normal breath sounds. Abdominal:      General: There is no distension. Palpations: Abdomen is soft. Tenderness: There is no abdominal tenderness. Musculoskeletal:         General: No tenderness or signs of injury. Normal range of motion. Cervical back: Normal range of motion and neck supple. No rigidity or tenderness. Skin:     General: Skin is warm and dry. Capillary Refill: Capillary refill takes less than 2 seconds. Neurological:      General: No focal deficit present. Mental Status: She is alert and oriented to person, place, and time. Mental status is at baseline. Cranial Nerves: No cranial nerve deficit. Sensory: No sensory deficit. Motor: No weakness. Coordination: Coordination normal.       DIAGNOSTICRESULTS   Labs:       IMAGING:    URGENT CARE COURSE:     Vitals:    08/17/22 1154   BP: 120/70   Site: Left Upper Arm   Position: Sitting   Cuff Size: Large Adult   Pulse: 81   SpO2: 98%   Weight: 203 lb 3.2 oz (92.2 kg)   Height: 5' (1.524 m)         PROCEDURES:  None  FINAL IMPRESSION      1. Benign paroxysmal positional vertigo, unspecified laterality        DISPOSITION/PLAN   DISPOSITION    Extended discharge instructions provided to patient including signs, symptoms and red flags that they should return to the emergency department. They express good understanding. Standard anticipatory guidance given to patient upon discharge. Have given them a specific time frame in which to follow-up and who to follow-up with.  I have also advised them that they should return to the emergency department if they get worse, or not getting better or develop any new or concerning symptoms. Patient demonstrates understanding and all questions were answered. PATIENT REFERRED TO:  Return in about 1 week (around 8/24/2022), or if symptoms worsen or fail to improve, for follow up with PCP. DISCHARGE MEDICATIONS:  New Prescriptions    MECLIZINE (ANTIVERT) 25 MG TABLET    Take 1 tablet by mouth 3 times daily as needed for Dizziness or Nausea     Cannot display discharge medications since this is not an admission.       Ranjeet Neumnan, APRN - CNP

## 2022-08-25 ENCOUNTER — HOSPITAL ENCOUNTER (OUTPATIENT)
Dept: SLEEP CENTER | Age: 67
Discharge: HOME | End: 2022-08-27
Payer: MEDICARE

## 2022-08-25 PROCEDURE — 95811 POLYSOM 6/>YRS CPAP 4/> PARM: CPT

## 2022-08-26 PROCEDURE — 95811 POLYSOM 6/>YRS CPAP 4/> PARM: CPT | Performed by: INTERNAL MEDICINE

## 2022-09-09 ENCOUNTER — HOSPITAL ENCOUNTER (OUTPATIENT)
Dept: LAB | Age: 67
Discharge: HOME | End: 2022-09-09
Payer: MEDICARE

## 2022-09-09 DIAGNOSIS — E11.65 TYPE 2 DIABETES MELLITUS WITH HYPERGLYCEMIA, WITHOUT LONG-TERM CURRENT USE OF INSULIN (HCC): ICD-10-CM

## 2022-09-09 LAB
ALBUMIN SERPL-MCNC: 4.2 G/DL (ref 3.5–4.6)
ALP BLD-CCNC: 68 U/L (ref 40–130)
ALT SERPL-CCNC: 26 U/L (ref 0–33)
ANION GAP SERPL CALCULATED.3IONS-SCNC: 13 MEQ/L (ref 9–15)
AST SERPL-CCNC: 21 U/L (ref 0–35)
BASOPHILS ABSOLUTE: 0 K/UL (ref 0–0.2)
BASOPHILS RELATIVE PERCENT: 0.4 %
BILIRUB SERPL-MCNC: 0.7 MG/DL (ref 0.2–0.7)
BUN BLDV-MCNC: 19 MG/DL (ref 8–23)
CALCIUM SERPL-MCNC: 9.3 MG/DL (ref 8.5–9.9)
CHLORIDE BLD-SCNC: 100 MEQ/L (ref 95–107)
CHOLESTEROL, TOTAL: 109 MG/DL (ref 0–199)
CO2: 28 MEQ/L (ref 20–31)
CREAT SERPL-MCNC: 0.56 MG/DL (ref 0.5–0.9)
CREATININE URINE: 112.2 MG/DL
EOSINOPHILS ABSOLUTE: 0.2 K/UL (ref 0–0.7)
EOSINOPHILS RELATIVE PERCENT: 2.2 %
GFR AFRICAN AMERICAN: >60
GFR NON-AFRICAN AMERICAN: >60
GLOBULIN: 2.5 G/DL (ref 2.3–3.5)
GLUCOSE BLD-MCNC: 176 MG/DL (ref 70–99)
HBA1C MFR BLD: 8.4 % (ref 4.8–5.9)
HCT VFR BLD CALC: 39.9 % (ref 37–47)
HDLC SERPL-MCNC: 38 MG/DL (ref 40–59)
HEMOGLOBIN: 13.9 G/DL (ref 12–16)
LDL CHOLESTEROL CALCULATED: 48 MG/DL (ref 0–129)
LYMPHOCYTES ABSOLUTE: 2 K/UL (ref 1–4.8)
LYMPHOCYTES RELATIVE PERCENT: 25.8 %
MCH RBC QN AUTO: 32.5 PG (ref 27–31.3)
MCHC RBC AUTO-ENTMCNC: 34.7 % (ref 33–37)
MCV RBC AUTO: 93.4 FL (ref 82–100)
MICROALBUMIN UR-MCNC: <1.2 MG/DL
MICROALBUMIN/CREAT UR-RTO: NORMAL MG/G (ref 0–30)
MONOCYTES ABSOLUTE: 0.6 K/UL (ref 0.2–0.8)
MONOCYTES RELATIVE PERCENT: 7.5 %
NEUTROPHILS ABSOLUTE: 5 K/UL (ref 1.4–6.5)
NEUTROPHILS RELATIVE PERCENT: 64.1 %
PDW BLD-RTO: 15 % (ref 11.5–14.5)
PLATELET # BLD: 273 K/UL (ref 130–400)
POTASSIUM SERPL-SCNC: 3.7 MEQ/L (ref 3.4–4.9)
RBC # BLD: 4.27 M/UL (ref 4.2–5.4)
SODIUM BLD-SCNC: 141 MEQ/L (ref 135–144)
TOTAL PROTEIN: 6.7 G/DL (ref 6.3–8)
TRIGL SERPL-MCNC: 115 MG/DL (ref 0–150)
WBC # BLD: 7.7 K/UL (ref 4.8–10.8)

## 2022-09-09 PROCEDURE — 83036 HEMOGLOBIN GLYCOSYLATED A1C: CPT

## 2022-09-09 PROCEDURE — 85025 COMPLETE CBC W/AUTO DIFF WBC: CPT

## 2022-09-09 PROCEDURE — 82570 ASSAY OF URINE CREATININE: CPT

## 2022-09-09 PROCEDURE — 36415 COLL VENOUS BLD VENIPUNCTURE: CPT

## 2022-09-09 PROCEDURE — 80053 COMPREHEN METABOLIC PANEL: CPT

## 2022-09-09 PROCEDURE — 80061 LIPID PANEL: CPT

## 2022-09-09 PROCEDURE — 82043 UR ALBUMIN QUANTITATIVE: CPT

## 2022-09-13 ENCOUNTER — OFFICE VISIT (OUTPATIENT)
Dept: FAMILY MEDICINE CLINIC | Age: 67
End: 2022-09-13
Payer: MEDICARE

## 2022-09-13 VITALS
DIASTOLIC BLOOD PRESSURE: 74 MMHG | RESPIRATION RATE: 16 BRPM | TEMPERATURE: 96.6 F | OXYGEN SATURATION: 97 % | BODY MASS INDEX: 40.33 KG/M2 | HEIGHT: 60 IN | WEIGHT: 205.4 LBS | SYSTOLIC BLOOD PRESSURE: 132 MMHG | HEART RATE: 72 BPM

## 2022-09-13 DIAGNOSIS — J45.41 MODERATE PERSISTENT ASTHMA WITH ACUTE EXACERBATION: ICD-10-CM

## 2022-09-13 DIAGNOSIS — R68.89 ACTIVITY INTOLERANCE: ICD-10-CM

## 2022-09-13 DIAGNOSIS — E11.40 TYPE 2 DIABETES MELLITUS WITH DIABETIC NEUROPATHY, WITHOUT LONG-TERM CURRENT USE OF INSULIN (HCC): Primary | ICD-10-CM

## 2022-09-13 DIAGNOSIS — R61 EXCESSIVE SWEATING: ICD-10-CM

## 2022-09-13 DIAGNOSIS — R68.89 HEAT INTOLERANCE: ICD-10-CM

## 2022-09-13 DIAGNOSIS — R43.2 ALTERED TASTE: ICD-10-CM

## 2022-09-13 DIAGNOSIS — G25.0 ESSENTIAL TREMOR: ICD-10-CM

## 2022-09-13 DIAGNOSIS — I10 ESSENTIAL HYPERTENSION: ICD-10-CM

## 2022-09-13 DIAGNOSIS — G47.33 OSA ON CPAP: ICD-10-CM

## 2022-09-13 DIAGNOSIS — E78.5 DYSLIPIDEMIA: ICD-10-CM

## 2022-09-13 DIAGNOSIS — Z86.010 HISTORY OF COLON POLYPS: ICD-10-CM

## 2022-09-13 DIAGNOSIS — R00.0 TACHYCARDIA: ICD-10-CM

## 2022-09-13 DIAGNOSIS — H81.10 BENIGN PAROXYSMAL POSITIONAL VERTIGO, UNSPECIFIED LATERALITY: ICD-10-CM

## 2022-09-13 DIAGNOSIS — M70.72 BURSITIS OF LEFT HIP, UNSPECIFIED BURSA: ICD-10-CM

## 2022-09-13 DIAGNOSIS — R00.2 PALPITATIONS: ICD-10-CM

## 2022-09-13 DIAGNOSIS — R71.8 ELEVATED MCV: ICD-10-CM

## 2022-09-13 DIAGNOSIS — Z23 NEED FOR VACCINATION: ICD-10-CM

## 2022-09-13 DIAGNOSIS — Z99.89 OSA ON CPAP: ICD-10-CM

## 2022-09-13 PROCEDURE — 2022F DILAT RTA XM EVC RTNOPTHY: CPT | Performed by: NURSE PRACTITIONER

## 2022-09-13 PROCEDURE — 1090F PRES/ABSN URINE INCON ASSESS: CPT | Performed by: NURSE PRACTITIONER

## 2022-09-13 PROCEDURE — 3052F HG A1C>EQUAL 8.0%<EQUAL 9.0%: CPT | Performed by: NURSE PRACTITIONER

## 2022-09-13 PROCEDURE — G0008 ADMIN INFLUENZA VIRUS VAC: HCPCS | Performed by: NURSE PRACTITIONER

## 2022-09-13 PROCEDURE — 90694 VACC AIIV4 NO PRSRV 0.5ML IM: CPT | Performed by: NURSE PRACTITIONER

## 2022-09-13 PROCEDURE — G8427 DOCREV CUR MEDS BY ELIG CLIN: HCPCS | Performed by: NURSE PRACTITIONER

## 2022-09-13 PROCEDURE — 1123F ACP DISCUSS/DSCN MKR DOCD: CPT | Performed by: NURSE PRACTITIONER

## 2022-09-13 PROCEDURE — 1036F TOBACCO NON-USER: CPT | Performed by: NURSE PRACTITIONER

## 2022-09-13 PROCEDURE — G8417 CALC BMI ABV UP PARAM F/U: HCPCS | Performed by: NURSE PRACTITIONER

## 2022-09-13 PROCEDURE — 3017F COLORECTAL CA SCREEN DOC REV: CPT | Performed by: NURSE PRACTITIONER

## 2022-09-13 PROCEDURE — 99214 OFFICE O/P EST MOD 30 MIN: CPT | Performed by: NURSE PRACTITIONER

## 2022-09-13 PROCEDURE — G8399 PT W/DXA RESULTS DOCUMENT: HCPCS | Performed by: NURSE PRACTITIONER

## 2022-09-13 RX ORDER — GLIMEPIRIDE 2 MG/1
TABLET ORAL
Qty: 180 TABLET | Refills: 2 | Status: SHIPPED | OUTPATIENT
Start: 2022-09-13

## 2022-09-13 RX ORDER — ATORVASTATIN CALCIUM 20 MG/1
20 TABLET, FILM COATED ORAL NIGHTLY
Qty: 90 TABLET | Refills: 1 | Status: SHIPPED | OUTPATIENT
Start: 2022-09-13

## 2022-09-13 RX ORDER — DILTIAZEM HYDROCHLORIDE 120 MG/1
120 CAPSULE, COATED, EXTENDED RELEASE ORAL DAILY
Qty: 90 CAPSULE | Refills: 1 | Status: SHIPPED | OUTPATIENT
Start: 2022-09-13

## 2022-09-13 RX ORDER — GLUCOSAMINE HCL/CHONDROITIN SU 500-400 MG
CAPSULE ORAL
Qty: 100 STRIP | Refills: 3 | Status: SHIPPED | OUTPATIENT
Start: 2022-09-13

## 2022-09-13 RX ORDER — LOSARTAN POTASSIUM 25 MG/1
25 TABLET ORAL DAILY
Qty: 90 TABLET | Refills: 2 | Status: SHIPPED | OUTPATIENT
Start: 2022-09-13

## 2022-09-13 RX ORDER — MONTELUKAST SODIUM 10 MG/1
10 TABLET ORAL NIGHTLY
Qty: 90 TABLET | Refills: 2 | Status: SHIPPED | OUTPATIENT
Start: 2022-09-13

## 2022-09-13 RX ORDER — METOPROLOL SUCCINATE 100 MG/1
150 TABLET, EXTENDED RELEASE ORAL DAILY
Qty: 90 TABLET | Refills: 5 | Status: SHIPPED | OUTPATIENT
Start: 2022-09-13

## 2022-09-13 NOTE — PROGRESS NOTES
Subjective:     Diabetes Mellitus Type 2: Current symptoms/problems include neuropathy. Home blood sugar records:  trend: stable  Any episodes of hypoglycemia? no  Tobacco history: She  reports that she has never smoked. She has never used smokeless tobacco.   Known diabetic complications: peripheral neuropathy  She has been walking around [facility] twice which is a mile. Gets about 5000 steps per day. Hypertension:  Home blood pressure monitoring: Yes - stable. She is adherent to a low sodium diet. Antihypertensive medication side effects: no medication side effects noted. Use of agents associated with hypertension: none. Other than possible altered taste. She has had excessive sweating that is worse with activity. This has been ongoing for some time now. She has had ongoing heat intolerance. Hyperlipidemia:  No new myalgias or GI upset on atorvastatin (Lipitor). Sleep apnea: Elgin Person has obstructive sleep apnea that is treated with CPAP. The CPAP is used  7 hours 7 nights per week. The CPAP use helps the daytime sleepiness and low energy levels. She has changed antihistamines and has not had to use inhaler at all in several weeks. Takes Singulair routinely. Hip bursitis: she was seen in [facility] and had muscle injection which was not helpful.   she has had issues with this over the summer. Has followed with . Had joint injection that helped with pain. Sugar levels have been up some after this. She also has some pain with medial collateral support of the left knee. Palpitations/tremor: she continues to follow with [facility] cardiology. BPV: she was seen in [facility] in August after developing symptoms suddenly. She had an ear irrigation and was given medication to help. Symptoms resolved over the course of 7-10 days. Altered taste: has noticed over recent months. Not a metallic taste but an altered taste such as after taste after having artificial sweeteners.      The five diabetic measures for control:   Hemoglobin A1C (%)   Date Value   09/09/2022 8.4 (H)     LDL Calculated (mg/dL)   Date Value   09/09/2022 48         Blood pressure less than 131/81,   BP Readings from Last 1 Encounters:   09/13/22 132/74     Smoking, non smoker is goal. This pt is not a smoker. This pt does an aspirin a day. Last eye exam was 2022  Last diabetic foot exam was 2022  Last urine microalbumin creatinine ratio was 2022    PMH: This 79 y.o. female  patient is  hypertensive, is  diabetic, is  hyperlipidemic. She has no history of smoking and has a  family history of heart disease. She is  obese. Review of Systems  Patient denies chest pain, shortness of breath, lightheadedness, blurred vision, peripheral edema, and dry cough. Eyes: no rapid change in vision  Ears, nose, mouth, throat, and face: no changes in hearing or sore throat  Respiratory: No shortness of breath or cough. Cardiovascular: no chest pain or pressure. This patient reports no polyuria, polydipsia or episodes of hypoglycemia. Treatment Adherence:   Medication compliance:  compliant most of the time  Diet compliance:  compliant most of the time  Weight trend: decreasing  Current exercise: walks 1 time(s) per day  What might prevent you from meeting your goal?: none  Patient plan for overcoming barriers: N/A     Patient Confidence: 8/10      Objective:   EXAM:  Constitutional Blood pressure 132/74, pulse 72, temperature (!) 96.6 °F (35.9 °C), temperature source Temporal, resp. rate 16, height 5' (1.524 m), weight 205 lb 6.4 oz (93.2 kg), SpO2 97 %, not currently breastfeeding. .  She has a normal affect, no acute distress, appears well developed and well nourished. Neck:  neck- supple, no mass, non-tender and no bruits  Lungs:  Normal expansion. Clear to auscultation. No rales, rhonchi, or wheezing., No chest wall tenderness.   Heart:  Heart sounds are normal.  Regular rate and rhythm without murmur, gallop or rub.  Abdomen:  Soft, non-tender, normal bowel sounds. No bruits, organomegaly or masses. Extremities: Extremities warm to touch, pink, with no edema. Assessment:      Diagnosis Orders   1. Type 2 diabetes mellitus with diabetic neuropathy, without long-term current use of insulin (HCC)  CBC with Auto Differential    Comprehensive Metabolic Panel    Lipid Panel    Hemoglobin A1C    Microalbumin / Creatinine Urine Ratio    blood glucose monitor strips      2. Essential hypertension  losartan (COZAAR) 25 MG tablet      3. Dyslipidemia  dilTIAZem (CARDIZEM CD) 120 MG extended release capsule    atorvastatin (LIPITOR) 20 MG tablet      4. Tachycardia  dilTIAZem (CARDIZEM CD) 120 MG extended release capsule    atorvastatin (LIPITOR) 20 MG tablet      5. Moderate persistent asthma with acute exacerbation  montelukast (SINGULAIR) 10 MG tablet      6. LYNNE on CPAP        7. Bursitis of left hip, unspecified bursa        8. Altered taste        9. Elevated MCV  Vitamin B12 & Folate    Vitamin B6    Vitamin B1      10. Essential tremor        11. Excessive sweating        12. History of colon polyps        13. Benign paroxysmal positional vertigo, unspecified laterality        14. Activity intolerance        15. Palpitations        16. Heat intolerance            PLAN: Include orders in the DX section. Diabetes Counseling   Patient was counseled regarding disease risks and adopting healthy behaviors. Patient was provided education materials to assist with self management. Patient was provided log (or received log during previous visit) to record blood pressure, food intake and/or blood sugar. Patient was instructed to keep log up-to-date and to always bring log to all office visits. Follow up: 3 months and as needed. Blood work one week prior as ordered. 1.  Diabetes is closer to goal of treatment with hemoglobin A1c under 9. She has had steroid injection of the joint however. We will recheck in 3 months.   She continues to get adequate physical activity and has been eating a healthier diet. Eating less Posta and starches. She will work on increasing water intake. 2. 8. 4. 10. 11.  15.  Blood pressure is well controlled on current medication. She has noticed some altered taste which she feels could be secondary to Cardizem. She is looked up some of the medication side effects and this was more recently added. She will follow-up with cardiology in the fall. No abnormalities noted to assessment of the tongue or palate. Notify me if symptoms worsen prior to visit with cardiology or if she starts to lose appetite more. She has not had any recent issues with heart palpitations or lightheadedness. 3.  Lipid panel is well controlled on statin. No side effects reported. Continue the same. 5.  6.  No recent exacerbation of asthma symptoms while taking current medications for allergies. She is following with [facility] sleep medicine and is waiting for a new CPAP machine. Is using her current machine routinely without issue. 7.  She will plan to continue to follow with orthopedic specialist.  Is also getting a new bed which should be helpful. Notify me or specialist if symptoms worsen. 9.  We discussed mildly elevated MCH. We will plan to obtain B complex and folic acid levels with next lab. She is currently taking a women's once a day vitamin that has B complex and it. Continue the same. No evidence of anemia. 12.  Reviewed recent colonoscopy report with colon polyp. Follow-up colonoscopy in 5 years. Diverticulosis unchanged. No symptoms of diverticulitis reported. 13.  Symptoms have resolved since onset during the summer that she feels could be related to having an impact with a bird house against her head while cutting the grass. This may very well have led to crystals of the inner ear shifting position. Notify me if symptoms return. 14. 16.   She has had cardiac work-up in the past and denies any symptoms of atypical chest pain presentation in women. She states that the symptoms have been persistent. Thyroid testing was done last year noted to be normal.  Sweating tends to occur more on her face and the back of her neck. She continues to increase physical activity when she can to help with deconditioning. She will notify me if symptoms become more significant. Please note this report has been partially produced using speech recognition software and may cause contain errors related to that system including grammar, punctuation and spelling as well as words and phrases that may seem inappropriate. If there are questions or concerns please feel free to contact me to clarify.       Electronically signed by PRASHANT Murphy CNP-CNP, 7:55 AM 9/13/22

## 2022-10-10 NOTE — TELEPHONE ENCOUNTER
Provider Department Appt Notes   11/16/2022 Lopez Fierro, [address] [facility] 6 months

## 2022-10-11 RX ORDER — CHLORTHALIDONE 25 MG/1
25 TABLET ORAL DAILY
Qty: 30 TABLET | Refills: 3 | Status: SHIPPED | OUTPATIENT
Start: 2022-10-11

## 2022-11-08 ENCOUNTER — CARE COORDINATION (OUTPATIENT)
Dept: CARE COORDINATION | Age: 67
End: 2022-11-08

## 2022-11-16 ENCOUNTER — OFFICE VISIT (OUTPATIENT)
Dept: CARDIOLOGY CLINIC | Age: 67
End: 2022-11-16
Payer: MEDICARE

## 2022-11-16 VITALS
WEIGHT: 208.6 LBS | HEART RATE: 76 BPM | OXYGEN SATURATION: 98 % | BODY MASS INDEX: 40.95 KG/M2 | DIASTOLIC BLOOD PRESSURE: 82 MMHG | HEIGHT: 60 IN | SYSTOLIC BLOOD PRESSURE: 136 MMHG | RESPIRATION RATE: 14 BRPM

## 2022-11-16 DIAGNOSIS — E11.40 TYPE 2 DIABETES MELLITUS WITH DIABETIC NEUROPATHY, UNSPECIFIED WHETHER LONG TERM INSULIN USE (HCC): ICD-10-CM

## 2022-11-16 DIAGNOSIS — I73.9 CLAUDICATION (HCC): ICD-10-CM

## 2022-11-16 DIAGNOSIS — R00.0 TACHYCARDIA: ICD-10-CM

## 2022-11-16 DIAGNOSIS — I10 ESSENTIAL HYPERTENSION: Primary | ICD-10-CM

## 2022-11-16 DIAGNOSIS — E78.5 DYSLIPIDEMIA: ICD-10-CM

## 2022-11-16 DIAGNOSIS — G47.33 OSA (OBSTRUCTIVE SLEEP APNEA): ICD-10-CM

## 2022-11-16 DIAGNOSIS — R09.89 BRUIT: ICD-10-CM

## 2022-11-16 PROCEDURE — 1123F ACP DISCUSS/DSCN MKR DOCD: CPT | Performed by: INTERNAL MEDICINE

## 2022-11-16 PROCEDURE — G8399 PT W/DXA RESULTS DOCUMENT: HCPCS | Performed by: INTERNAL MEDICINE

## 2022-11-16 PROCEDURE — 1036F TOBACCO NON-USER: CPT | Performed by: INTERNAL MEDICINE

## 2022-11-16 PROCEDURE — G8484 FLU IMMUNIZE NO ADMIN: HCPCS | Performed by: INTERNAL MEDICINE

## 2022-11-16 PROCEDURE — 99213 OFFICE O/P EST LOW 20 MIN: CPT | Performed by: INTERNAL MEDICINE

## 2022-11-16 PROCEDURE — 3017F COLORECTAL CA SCREEN DOC REV: CPT | Performed by: INTERNAL MEDICINE

## 2022-11-16 PROCEDURE — G8427 DOCREV CUR MEDS BY ELIG CLIN: HCPCS | Performed by: INTERNAL MEDICINE

## 2022-11-16 PROCEDURE — G8417 CALC BMI ABV UP PARAM F/U: HCPCS | Performed by: INTERNAL MEDICINE

## 2022-11-16 PROCEDURE — 2022F DILAT RTA XM EVC RTNOPTHY: CPT | Performed by: INTERNAL MEDICINE

## 2022-11-16 PROCEDURE — 3074F SYST BP LT 130 MM HG: CPT | Performed by: INTERNAL MEDICINE

## 2022-11-16 PROCEDURE — 3078F DIAST BP <80 MM HG: CPT | Performed by: INTERNAL MEDICINE

## 2022-11-16 PROCEDURE — 3052F HG A1C>EQUAL 8.0%<EQUAL 9.0%: CPT | Performed by: INTERNAL MEDICINE

## 2022-11-16 PROCEDURE — 1090F PRES/ABSN URINE INCON ASSESS: CPT | Performed by: INTERNAL MEDICINE

## 2022-11-16 RX ORDER — CHLORTHALIDONE 25 MG/1
25 TABLET ORAL DAILY
Qty: 180 TABLET | Refills: 5 | Status: SHIPPED | OUTPATIENT
Start: 2022-11-16

## 2022-11-16 RX ORDER — ATORVASTATIN CALCIUM 20 MG/1
20 TABLET, FILM COATED ORAL NIGHTLY
Qty: 180 TABLET | Refills: 5 | Status: SHIPPED | OUTPATIENT
Start: 2022-11-16

## 2022-11-16 RX ORDER — DILTIAZEM HYDROCHLORIDE 120 MG/1
120 CAPSULE, COATED, EXTENDED RELEASE ORAL DAILY
Qty: 180 CAPSULE | Refills: 5 | Status: SHIPPED | OUTPATIENT
Start: 2022-11-16

## 2022-11-16 RX ORDER — METOPROLOL SUCCINATE 100 MG/1
150 TABLET, EXTENDED RELEASE ORAL DAILY
Qty: 180 TABLET | Refills: 5 | Status: SHIPPED | OUTPATIENT
Start: 2022-11-16

## 2022-11-16 ASSESSMENT — ENCOUNTER SYMPTOMS
VOMITING: 0
COLOR CHANGE: 0
ABDOMINAL PAIN: 0
CONSTIPATION: 0
EYE REDNESS: 0
SHORTNESS OF BREATH: 0
APNEA: 0
DIARRHEA: 0
COUGH: 0
CHEST TIGHTNESS: 0
WHEEZING: 0
NAUSEA: 0
RHINORRHEA: 0

## 2022-11-16 NOTE — PROGRESS NOTES
Chief Complaint   Patient presents with    6 Month Follow-Up     For HTN. Patient presents for initial medical evaluation. Patient is followed on a regular basis by Dr. Aundrea Moyer, APRN - CNP. Morbidly obese  Diabetes  LYNNE on CPAP  Hypertension  Mild MR  Hyperlipidemia  Recent hospitalization to [facility] on 1/21/2022 and discharged 1/22/2022 for palpitations. Nuclear stress test negative  Nuclear stress test on 1/21/2022 normal EF 82%. Inferior wall filling defect possibly breast tissue attenuation. Otherwise no ischemia noted. Echocardiogram 1/21/2022 EF 60%, severely dilated left atria, mild MR  Holter 3/1/2022 1 run 4 beats of V. tach no other major arrhythmias  ==================  11/16/2022  Follow-up on palpitations  Last visit patient was recommended to increase her metoprolol which patient is doing. Reports that she feels better  She is only felt 1 palpitation in the last month which is significant improvement  Patient taking all her blood pressure medications    5/25/2022  Follow-up visit on palpitations. Patient underwent a Holter monitor with was reported as 1 run of 4 beats of V. tach  No other major arrhythmias no A. Fib  Patient reports that she is more physically active  States that probably because of physical activity she does not feel the palpitations anymore. Compliant with all her medications without side effects  She continues to use her CPAP at night  I would like to increase her metoprolol      2/23/2022  First clinic visit follow-up after hospitalization for palpitations and chest pain  During that hospitalization cardiac enzymes were negative, EKG without ischemia, patient was discharged after a negative nuclear stress test.    Today patient reports that she is feeling better.   She is wearing a fit arm band   TSH normal a month ago  Reports that during the day sometimes she notices that her heart rate goes up to the 150s  Reports that diltiazem is making her feel better and improves her heart rate. Currently 120 mg daily, patient also on metoprolol  No chest pain or shortness of breath with physical activity  Patient states that she is now doing gardening, and is wondering if she can do more physical activity than that. She likes to bike.   From cardiology standpoint she is cleared to do as much physical activity as tolerated      Patient Active Problem List   Diagnosis    Controlled diabetes mellitus type II without complication ([address]    Pseudophakia    After-cataract obscuring vision    LYNNE (obstructive sleep apnea)    Presence of intraocular lens    Sprain of medial collateral ligament of left knee    Type 2 diabetes mellitus without complication (HCC)    Asthma    Essential tremor    History of kidney stones    Maier's neuroma of both feet    Allergic rhinitis    Neuropathy    DDD (degenerative disc disease), lumbosacral    Low HDL (under 40)    Essential hypertension    Morbidly obese (HCC)    Chest pain    Type 2 diabetes mellitus with diabetic neuropathy    Type 2 diabetes mellitus with hyperglycemia    History of colon polyps       Past Surgical History:   Procedure Laterality Date    CARPAL TUNNEL RELEASE Bilateral 80's    carpal tunnel both wrists and right foot, MAIER'S NEUROMA    COLONOSCOPY N/A 7/27/2022    COLORECTAL CANCER SCREENING, HIGH RISK performed by Sharon Elizabeth MD at [address] [facility] Right 03/2017    had film removed on right eye    JOINT REPLACEMENT Left 09/22/2010    left thumb replacement    KNEE SURGERY Bilateral     torn meniscus 1/16/2009, 2/12/2009, 2/2016    KNEE SURGERY Left 02/2016    OVARIAN CYST REMOVAL  05/13/2008    ROTATOR CUFF REPAIR Left 01/08/2004    ROTATOR CUFF REPAIR Right 08/22/2005    TONSILLECTOMY AND ADENOIDECTOMY  1963       Social History     Socioeconomic History    Marital status: Single   Tobacco Use    Smoking status: Never    Smokeless tobacco: Never   Vaping Use    Vaping Use: Never used   Substance and Sexual Activity    Alcohol use: Yes     Comment: occassionally    Drug use: No    Sexual activity: Not Currently     Social Determinants of Health     Financial Resource Strain: Low Risk     Difficulty of Paying Living Expenses: Not hard at all   Food Insecurity: No Food Insecurity    Worried About Running Out of Food in the Last Year: Never true    Ran Out of Food in the Last Year: Never true   Physical Activity: Unknown    Days of Exercise per Week: 5 days    Minutes of Exercise per Session: Patient refused       Family History   Problem Relation Age of Onset    Lung Cancer Mother     Diabetes Mother     Hypertension Mother     Diabetes Father     Hypertension Father     Hypertension Brother     Diabetes Brother     Breast Cancer Maternal Aunt        Current Outpatient Medications   Medication Sig Dispense Refill    chlorthalidone (HYGROTON) 25 MG tablet Take 1 tablet by mouth daily 180 tablet 5    metoprolol succinate (TOPROL XL) 100 MG extended release tablet Take 1.5 tablets by mouth daily 180 tablet 5    dilTIAZem (CARDIZEM CD) 120 MG extended release capsule Take 1 capsule by mouth daily 180 capsule 5    atorvastatin (LIPITOR) 20 MG tablet Take 1 tablet by mouth nightly 180 tablet 5    montelukast (SINGULAIR) 10 MG tablet Take 1 tablet by mouth nightly 90 tablet 2    metFORMIN (GLUCOPHAGE) 1000 MG tablet TAKE 1 TABLET TWICE A DAY WITH MEALS (NEED APPOINTMENT FOR FURTHER REFILLS) 180 tablet 1    losartan (COZAAR) 25 MG tablet Take 1 tablet by mouth daily 90 tablet 2    glimepiride (AMARYL) 2 MG tablet TAKE 1 TABLET BY MOUTH  EVERY MORNING BEFORE  BREAKFAST AND EVERY EVENING BEFORE DINNER. 180 tablet 2    blood glucose monitor strips Test 2 times a day & as needed for symptoms of irregular blood glucose. Dispense sufficient amount for indicated testing frequency plus additional to accommodate PRN testing needs. One touch meter.  100 strip 3    ibuprofen (ADVIL;MOTRIN) 600 MG tablet Take 1 tablet by mouth 4 times daily as needed for Pain 120 tablet 1    Fexofenadine-Pseudoephedrine (ALLEGRA-D 24 HOUR PO) Take by mouth      Coenzyme Q10 (CO Q 10) 100 MG CAPS Take 1 capsule by mouth daily 30 capsule 11    VENTOLIN  (90 Base) MCG/ACT inhaler INHALE 2 PUFFS EVERY 4 HOURS AS NEEDED 1 each 3    Omega-3 Fatty Acids (FISH OIL) 1200 MG CAPS Take by mouth      Multiple Vitamins-Minerals (MULTIVITAMIN & MINERAL PO) Take by mouth      aspirin 81 MG tablet Take 81 mg by mouth       No current facility-administered medications for this visit. Salsalate, Molds & smuts, and Percocet [oxycodone-acetaminophen]    Review of Systems:  Review of Systems   Constitutional:  Negative for activity change, chills, diaphoresis and fever. HENT:  Negative for congestion, ear pain, nosebleeds and rhinorrhea. Eyes:  Negative for redness and visual disturbance. Respiratory:  Negative for apnea, cough, chest tightness, shortness of breath and wheezing. Cardiovascular:  Negative for chest pain, palpitations and leg swelling. Gastrointestinal:  Negative for abdominal pain, constipation, diarrhea, nausea and vomiting. Genitourinary:  Negative for difficulty urinating and dysuria. Musculoskeletal: Negative. Negative for joint swelling. Skin:  Negative for color change, rash and wound. Neurological:  Negative for dizziness, syncope, weakness, numbness and headaches. Psychiatric/Behavioral: Negative. VITALS:  Blood pressure 136/82, pulse 76, resp. rate 14, height 5' (1.524 m), weight 208 lb 9.6 oz (94.6 kg), SpO2 98 %, not currently breastfeeding. Body mass index is 40.74 kg/m². Physical Examination:  Physical Exam  Vitals and nursing note reviewed. Constitutional:       Appearance: Normal appearance. She is obese. HENT:      Head: Normocephalic and atraumatic. Mouth/Throat:      Mouth: Mucous membranes are moist.      Pharynx: Oropharynx is clear.    Eyes:      Extraocular Movements: Extraocular movements intact. Conjunctiva/sclera: Conjunctivae normal.      Pupils: Pupils are equal, round, and reactive to light. Cardiovascular:      Rate and Rhythm: Normal rate and regular rhythm. Pulses: Normal pulses. Heart sounds: Normal heart sounds. Pulmonary:      Effort: Pulmonary effort is normal.      Breath sounds: Normal breath sounds. Abdominal:      General: Abdomen is flat. Bowel sounds are normal.      Palpations: Abdomen is soft. Musculoskeletal:         General: Normal range of motion. Cervical back: Normal range of motion and neck supple. Skin:     General: Skin is warm. Neurological:      General: No focal deficit present. Mental Status: She is alert and oriented to person, place, and time. Mental status is at baseline. Psychiatric:         Mood and Affect: Mood normal.      EKG: Sinus rhythm    Orders Placed This Encounter   Procedures    US JACQUELYN RAUL REST AND POST TREAD COMPLETE    US CAROTID ARTERY BILATERAL       The ASCVD Risk score (Karyn WILLETT, et al., 2019) failed to calculate for the following reasons: The valid total cholesterol range is 130 to 320 mg/dL     ASSESSMENT:     Diagnosis Orders   1. Essential hypertension        2. Tachycardia  chlorthalidone (HYGROTON) 25 MG tablet    metoprolol succinate (TOPROL XL) 100 MG extended release tablet    dilTIAZem (CARDIZEM CD) 120 MG extended release capsule    atorvastatin (LIPITOR) 20 MG tablet      3. Dyslipidemia  chlorthalidone (HYGROTON) 25 MG tablet    metoprolol succinate (TOPROL XL) 100 MG extended release tablet    dilTIAZem (CARDIZEM CD) 120 MG extended release capsule    atorvastatin (LIPITOR) 20 MG tablet      4. Type 2 diabetes mellitus with diabetic neuropathy, unspecified whether long term insulin use ([address]        5. LYNNE (obstructive sleep apnea)        6. Claudication ([address]  US JACQUELYN RAUL REST AND POST TREAD COMPLETE      7. Bruit  US CAROTID ARTERY BILATERAL            PLAN:   Palpitations.   Condition improving  Holter monitor March 2022 capture 1 run of 4 beats of V. tach. Patient had a echocardiogram and a stress test at the beginning of the year which both were negative  Continue metoprolol metoprolol 100 mg in the morning and 50 mg at night   continue chlorthalidone 25 mg daily  Continue atorvastatin 20 mg daily  Continue diltiazem 120 mg daily    Hypertension  Continue losartan 25 mg daily  I will start patient on hydrochlorothiazide  I instructed patient to check her blood pressures daily keep a log and to bring those readings to next clinic appointment    LYNNE  On CPAP    Return to clinic in 6 months    Recommended patient to eat diets that emphasize high intakes of vegetables, fruits, nuts, whole grains, lean vegetable or animal protein, and fish. As per 2019 ACC/AHA guideline on primary prevention of CVD     Recommended patient to engage in at least 150 minutes per week of accumulated moderate-intensity physical activity or 75 minutes per week of vigorous-intensity physical activity as per 2019 ACC/AHA guideline on primary prevention of CVD           Continue medications at current doses.

## 2022-11-22 ENCOUNTER — CARE COORDINATION (OUTPATIENT)
Dept: CARE COORDINATION | Age: 67
End: 2022-11-22

## 2022-11-22 NOTE — CARE COORDINATION
I called to discuss care coordination with Hal Smith  I discussed my role and the process of care coordination  She tell me that she does have health issues but she feels that they are pretty controlled at this point  She is in agreement to call me with any changes or concerns  I have provided her with my contact information

## 2022-11-23 ENCOUNTER — HOSPITAL ENCOUNTER (OUTPATIENT)
Dept: LAB | Age: 67
Discharge: HOME | End: 2022-11-23
Payer: MEDICARE

## 2022-11-23 DIAGNOSIS — E11.40 TYPE 2 DIABETES MELLITUS WITH DIABETIC NEUROPATHY, WITHOUT LONG-TERM CURRENT USE OF INSULIN (HCC): ICD-10-CM

## 2022-11-23 DIAGNOSIS — R71.8 ELEVATED MCV: ICD-10-CM

## 2022-11-23 LAB
ALBUMIN SERPL-MCNC: 4.2 G/DL (ref 3.5–4.6)
ALP BLD-CCNC: 78 U/L (ref 40–130)
ALT SERPL-CCNC: 21 U/L (ref 0–33)
ANION GAP SERPL CALCULATED.3IONS-SCNC: 15 MEQ/L (ref 9–15)
AST SERPL-CCNC: 18 U/L (ref 0–35)
BASOPHILS ABSOLUTE: 0 K/UL (ref 0–0.2)
BASOPHILS RELATIVE PERCENT: 0.5 %
BILIRUB SERPL-MCNC: 0.5 MG/DL (ref 0.2–0.7)
BUN BLDV-MCNC: 16 MG/DL (ref 8–23)
CALCIUM SERPL-MCNC: 9.2 MG/DL (ref 8.5–9.9)
CHLORIDE BLD-SCNC: 100 MEQ/L (ref 95–107)
CHOLESTEROL, TOTAL: 103 MG/DL (ref 0–199)
CO2: 28 MEQ/L (ref 20–31)
CREAT SERPL-MCNC: 0.67 MG/DL (ref 0.5–0.9)
CREATININE URINE: 188.9 MG/DL
EOSINOPHILS ABSOLUTE: 0.1 K/UL (ref 0–0.7)
EOSINOPHILS RELATIVE PERCENT: 1.4 %
FOLATE: 19.6 NG/ML
GFR SERPL CREATININE-BSD FRML MDRD: >60 ML/MIN/{1.73_M2}
GLOBULIN: 2.4 G/DL (ref 2.3–3.5)
GLUCOSE BLD-MCNC: 169 MG/DL (ref 70–99)
HBA1C MFR BLD: 7.4 % (ref 4.8–5.9)
HCT VFR BLD CALC: 41.7 % (ref 37–47)
HDLC SERPL-MCNC: 42 MG/DL (ref 40–59)
HEMOGLOBIN: 14 G/DL (ref 12–16)
LDL CHOLESTEROL CALCULATED: 39 MG/DL (ref 0–129)
LYMPHOCYTES ABSOLUTE: 1.9 K/UL (ref 1–4.8)
LYMPHOCYTES RELATIVE PERCENT: 20.9 %
MCH RBC QN AUTO: 31.5 PG (ref 27–31.3)
MCHC RBC AUTO-ENTMCNC: 33.7 % (ref 33–37)
MCV RBC AUTO: 93.4 FL (ref 79.4–94.8)
MICROALBUMIN UR-MCNC: 2.7 MG/DL
MICROALBUMIN/CREAT UR-RTO: 14.3 MG/G (ref 0–30)
MONOCYTES ABSOLUTE: 0.6 K/UL (ref 0.2–0.8)
MONOCYTES RELATIVE PERCENT: 6.3 %
NEUTROPHILS ABSOLUTE: 6.5 K/UL (ref 1.4–6.5)
NEUTROPHILS RELATIVE PERCENT: 70.9 %
PDW BLD-RTO: 13.9 % (ref 11.5–14.5)
PLATELET # BLD: 299 K/UL (ref 130–400)
POTASSIUM SERPL-SCNC: 3.4 MEQ/L (ref 3.4–4.9)
RBC # BLD: 4.46 M/UL (ref 4.2–5.4)
SODIUM BLD-SCNC: 143 MEQ/L (ref 135–144)
TOTAL PROTEIN: 6.6 G/DL (ref 6.3–8)
TRIGL SERPL-MCNC: 110 MG/DL (ref 0–150)
VITAMIN B-12: 414 PG/ML (ref 232–1245)
WBC # BLD: 9.1 K/UL (ref 4.8–10.8)

## 2022-11-23 PROCEDURE — 82607 VITAMIN B-12: CPT

## 2022-11-23 PROCEDURE — 84425 ASSAY OF VITAMIN B-1: CPT

## 2022-11-23 PROCEDURE — 80053 COMPREHEN METABOLIC PANEL: CPT

## 2022-11-23 PROCEDURE — 36415 COLL VENOUS BLD VENIPUNCTURE: CPT

## 2022-11-23 PROCEDURE — 82043 UR ALBUMIN QUANTITATIVE: CPT

## 2022-11-23 PROCEDURE — 85025 COMPLETE CBC W/AUTO DIFF WBC: CPT

## 2022-11-23 PROCEDURE — 80061 LIPID PANEL: CPT

## 2022-11-23 PROCEDURE — 82570 ASSAY OF URINE CREATININE: CPT

## 2022-11-23 PROCEDURE — 83036 HEMOGLOBIN GLYCOSYLATED A1C: CPT

## 2022-11-23 PROCEDURE — 84207 ASSAY OF VITAMIN B-6: CPT

## 2022-11-23 PROCEDURE — 82746 ASSAY OF FOLIC ACID SERUM: CPT

## 2022-11-26 LAB — VITAMIN B6: 108.9 NMOL/L (ref 20–125)

## 2022-11-27 ENCOUNTER — OFFICE VISIT (OUTPATIENT)
Dept: FAMILY MEDICINE CLINIC | Age: 67
End: 2022-11-27
Payer: MEDICARE

## 2022-11-27 VITALS
SYSTOLIC BLOOD PRESSURE: 120 MMHG | WEIGHT: 204 LBS | BODY MASS INDEX: 40.05 KG/M2 | OXYGEN SATURATION: 95 % | HEIGHT: 60 IN | DIASTOLIC BLOOD PRESSURE: 78 MMHG | HEART RATE: 71 BPM | TEMPERATURE: 97 F

## 2022-11-27 DIAGNOSIS — R42 VERTIGO: Primary | ICD-10-CM

## 2022-11-27 PROCEDURE — 1123F ACP DISCUSS/DSCN MKR DOCD: CPT | Performed by: NURSE PRACTITIONER

## 2022-11-27 PROCEDURE — 3017F COLORECTAL CA SCREEN DOC REV: CPT | Performed by: NURSE PRACTITIONER

## 2022-11-27 PROCEDURE — G8399 PT W/DXA RESULTS DOCUMENT: HCPCS | Performed by: NURSE PRACTITIONER

## 2022-11-27 PROCEDURE — G8427 DOCREV CUR MEDS BY ELIG CLIN: HCPCS | Performed by: NURSE PRACTITIONER

## 2022-11-27 PROCEDURE — 3078F DIAST BP <80 MM HG: CPT | Performed by: NURSE PRACTITIONER

## 2022-11-27 PROCEDURE — 1090F PRES/ABSN URINE INCON ASSESS: CPT | Performed by: NURSE PRACTITIONER

## 2022-11-27 PROCEDURE — G8417 CALC BMI ABV UP PARAM F/U: HCPCS | Performed by: NURSE PRACTITIONER

## 2022-11-27 PROCEDURE — 99213 OFFICE O/P EST LOW 20 MIN: CPT | Performed by: NURSE PRACTITIONER

## 2022-11-27 PROCEDURE — 3074F SYST BP LT 130 MM HG: CPT | Performed by: NURSE PRACTITIONER

## 2022-11-27 PROCEDURE — G8484 FLU IMMUNIZE NO ADMIN: HCPCS | Performed by: NURSE PRACTITIONER

## 2022-11-27 PROCEDURE — 1036F TOBACCO NON-USER: CPT | Performed by: NURSE PRACTITIONER

## 2022-11-27 RX ORDER — ONDANSETRON 4 MG/1
4 TABLET, ORALLY DISINTEGRATING ORAL 3 TIMES DAILY PRN
Qty: 21 TABLET | Refills: 0 | Status: SHIPPED | OUTPATIENT
Start: 2022-11-27

## 2022-11-27 RX ORDER — MECLIZINE HYDROCHLORIDE 25 MG/1
25 TABLET ORAL 3 TIMES DAILY PRN
Qty: 30 TABLET | Refills: 0 | Status: SHIPPED | OUTPATIENT
Start: 2022-11-27 | End: 2022-12-07

## 2022-11-27 NOTE — PROGRESS NOTES
Subjective:      Patient ID: Flakito Gutierres is a 79 y.o. female who presents today for:  Chief Complaint   Patient presents with    Dizziness     History of vertigo, improved then progressed worse. No known cause, discomfort behind ears. Difficulty walking. Started back up x2 days ago.        HPI      As long as she didn't move her head or bend over she was okay   Was careful when she sat up this morning   She threw up today  She got sick yesterday moring   Started Friday   She had a bout of vertigo and this was like 3-4  months ago  When she sits up its like shes looking through a colidascope   The rooms spins and shes loses her sence of balance  Having to lean on the walls   Nausea and vomiting   She hasn't had any illness     Good exam         Past Medical History:   Diagnosis Date    Asthma     CAD (coronary artery disease)     Hypertension     Kidney stone     Sleep apnea     wears cpap    Type 2 diabetes mellitus without complication ([facility] [address]      Past Surgical History:   Procedure Laterality Date    CARPAL TUNNEL RELEASE Bilateral 80's    carpal tunnel both wrists and right foot, POLLACK'S NEUROMA    COLONOSCOPY N/A 7/27/2022    COLORECTAL CANCER SCREENING, HIGH RISK performed by Aly Blank MD at [facility] Right 03/2017    had film removed on right eye    JOINT REPLACEMENT Left 09/22/2010    left thumb replacement    KNEE SURGERY Bilateral     torn meniscus 1/16/2009, 2/12/2009, 2/2016    KNEE SURGERY Left 02/2016    OVARIAN CYST REMOVAL  05/13/2008    ROTATOR CUFF REPAIR Left 01/08/2004    ROTATOR CUFF REPAIR Right 08/22/2005    TONSILLECTOMY AND ADENOIDECTOMY  1963     Social History     Socioeconomic History    Marital status: Single     Spouse name: Not on file    Number of children: Not on file    Years of education: Not on file    Highest education level: Not on file   Occupational History    Not on file   Tobacco Use    Smoking status: Never     Passive exposure: Never    Smokeless tobacco: Never   Vaping Use    Vaping Use: Never used   Substance and Sexual Activity    Alcohol use: Yes     Comment: occassionally    Drug use: No    Sexual activity: Not Currently   Other Topics Concern    Not on file   Social History Narrative    Not on file     Social Determinants of Health     Financial Resource Strain: Low Risk     Difficulty of Paying Living Expenses: Not hard at all   Food Insecurity: No Food Insecurity    Worried About Running Out of Food in the Last Year: Never true    Ran Out of Food in the Last Year: Never true   Transportation Needs: Not on file   Physical Activity: Unknown    Days of Exercise per Week: 5 days    Minutes of Exercise per Session: Patient refused   Stress: Not on file   Social Connections: Not on file   Intimate Partner Violence: Not on file   Housing Stability: Not on file     Family History   Problem Relation Age of Onset    Lung Cancer Mother     Diabetes Mother     Hypertension Mother     Diabetes Father     Hypertension Father     Hypertension Brother     Diabetes Brother     Breast Cancer Maternal Aunt      Allergies   Allergen Reactions    Salsalate Other (See Comments)     causes pt to lose hearing    Molds & Smuts      Causes sneezing and asthma    Percocet [Oxycodone-Acetaminophen] Nausea And Vomiting     Current Outpatient Medications   Medication Sig Dispense Refill    meclizine (ANTIVERT) 25 MG tablet Take 1 tablet by mouth 3 times daily as needed for Dizziness or Nausea 30 tablet 0    ondansetron (ZOFRAN-ODT) 4 MG disintegrating tablet Take 1 tablet by mouth 3 times daily as needed for Nausea or Vomiting 21 tablet 0    chlorthalidone (HYGROTON) 25 MG tablet Take 1 tablet by mouth daily 180 tablet 5    metoprolol succinate (TOPROL XL) 100 MG extended release tablet Take 1.5 tablets by mouth daily 180 tablet 5    dilTIAZem (CARDIZEM CD) 120 MG extended release capsule Take 1 capsule by mouth daily 180 capsule 5    atorvastatin (LIPITOR) 20 MG tablet Take 1 tablet by mouth nightly 180 tablet 5    montelukast (SINGULAIR) 10 MG tablet Take 1 tablet by mouth nightly 90 tablet 2    metFORMIN (GLUCOPHAGE) 1000 MG tablet TAKE 1 TABLET TWICE A DAY WITH MEALS (NEED APPOINTMENT FOR FURTHER REFILLS) 180 tablet 1    losartan (COZAAR) 25 MG tablet Take 1 tablet by mouth daily 90 tablet 2    glimepiride (AMARYL) 2 MG tablet TAKE 1 TABLET BY MOUTH  EVERY MORNING BEFORE  BREAKFAST AND EVERY EVENING BEFORE DINNER. 180 tablet 2    blood glucose monitor strips Test 2 times a day & as needed for symptoms of irregular blood glucose. Dispense sufficient amount for indicated testing frequency plus additional to accommodate PRN testing needs. One touch meter. 100 strip 3    ibuprofen (ADVIL;MOTRIN) 600 MG tablet Take 1 tablet by mouth 4 times daily as needed for Pain 120 tablet 1    Fexofenadine-Pseudoephedrine (ALLEGRA-D 24 HOUR PO) Take by mouth      Coenzyme Q10 (CO Q 10) 100 MG CAPS Take 1 capsule by mouth daily 30 capsule 11    Omega-3 Fatty Acids (FISH OIL) 1200 MG CAPS Take by mouth      Multiple Vitamins-Minerals (MULTIVITAMIN & MINERAL PO) Take by mouth      aspirin 81 MG tablet Take 81 mg by mouth      VENTOLIN  (90 Base) MCG/ACT inhaler INHALE 2 PUFFS EVERY 4 HOURS AS NEEDED (Patient not taking: Reported on 11/27/2022) 1 each 3     No current facility-administered medications for this visit. Review of Systems   Constitutional:  Negative for chills, fatigue and fever. HENT:  Negative for congestion, rhinorrhea and sore throat. Respiratory:  Negative for cough, shortness of breath and wheezing. Gastrointestinal:  Positive for nausea and vomiting. Negative for diarrhea. Musculoskeletal:  Negative for myalgias. Skin:  Negative for rash. Neurological:  Positive for dizziness. Negative for headaches. Psychiatric/Behavioral:  Negative for agitation, confusion and hallucinations.       Objective:   /78 (Site: Right Upper Arm, Position: Sitting, Cuff Size: Large Adult)   Pulse 71   Temp 97 °F (36.1 °C) (Temporal)   Ht 5' (1.524 m)   Wt 204 lb (92.5 kg)   SpO2 95%   BMI 39.84 kg/m²     Physical Exam  Vitals reviewed. Constitutional:       Appearance: Normal appearance. HENT:      Head: Normocephalic and atraumatic. Right Ear: Hearing, tympanic membrane, ear canal and external ear normal. No middle ear effusion. No foreign body. Tympanic membrane is not injected, erythematous or bulging. Left Ear: Hearing, tympanic membrane, ear canal and external ear normal.  No middle ear effusion. No foreign body. Tympanic membrane is not injected, erythematous or bulging. Nose: Nose normal. No congestion or rhinorrhea. Right Nostril: No foreign body. Left Nostril: No foreign body. Right Turbinates: Not enlarged. Left Turbinates: Not enlarged. Right Sinus: No maxillary sinus tenderness or frontal sinus tenderness. Left Sinus: No maxillary sinus tenderness or frontal sinus tenderness. Mouth/Throat:      Lips: Pink. Mouth: Mucous membranes are moist.      Pharynx: Oropharynx is clear. Uvula midline. No pharyngeal swelling, oropharyngeal exudate, posterior oropharyngeal erythema or uvula swelling. Tonsils: No tonsillar exudate or tonsillar abscesses. Eyes:      General: Lids are normal.         Right eye: No foreign body. Left eye: No foreign body. Extraocular Movements: Extraocular movements intact. Conjunctiva/sclera: Conjunctivae normal.   Cardiovascular:      Rate and Rhythm: Normal rate and regular rhythm. Heart sounds: Normal heart sounds. Pulmonary:      Effort: Pulmonary effort is normal. No tachypnea, accessory muscle usage or respiratory distress. Breath sounds: Normal breath sounds. No wheezing or rhonchi. Abdominal:      Tenderness: There is no abdominal tenderness. There is no guarding. Musculoskeletal:         General: Normal range of motion.       Cervical back: Normal range of motion. Lymphadenopathy:      Cervical: No cervical adenopathy. Upper Body:      Right upper body: No supraclavicular adenopathy. Left upper body: No supraclavicular adenopathy. Skin:     General: Skin is warm and dry. Capillary Refill: Capillary refill takes less than 2 seconds. Neurological:      General: No focal deficit present. Mental Status: She is alert and oriented to person, place, and time. Gait: Gait is intact. Psychiatric:         Mood and Affect: Mood normal.         Speech: Speech normal.         Behavior: Behavior normal. Behavior is cooperative. Thought Content: Thought content normal.         Judgment: Judgment normal.       Assessment:       Diagnosis Orders   1. Vertigo  meclizine (ANTIVERT) 25 MG tablet    ondansetron (ZOFRAN-ODT) 4 MG disintegrating tablet        No results found for this visit on 11/27/22. Plan:     Assessment & Plan   Shantal Meyer was seen today for dizziness. Diagnoses and all orders for this visit:    Vertigo  -     meclizine (ANTIVERT) 25 MG tablet; Take 1 tablet by mouth 3 times daily as needed for Dizziness or Nausea  -     ondansetron (ZOFRAN-ODT) 4 MG disintegrating tablet; Take 1 tablet by mouth 3 times daily as needed for Nausea or Vomiting    No orders of the defined types were placed in this encounter. Orders Placed This Encounter   Medications    meclizine (ANTIVERT) 25 MG tablet     Sig: Take 1 tablet by mouth 3 times daily as needed for Dizziness or Nausea     Dispense:  30 tablet     Refill:  0    ondansetron (ZOFRAN-ODT) 4 MG disintegrating tablet     Sig: Take 1 tablet by mouth 3 times daily as needed for Nausea or Vomiting     Dispense:  21 tablet     Refill:  0       Medications Discontinued During This Encounter   Medication Reason    meclizine (ANTIVERT) 25 MG tablet REORDER     Return for Follow up with PCP. Reviewed with the patient/family: current clinical status & medications.   Side effects of the medication prescribed today, as well as treatment plan/rationale and result expectations have been discussed with the patient/family who expresses understanding. Patient will be discharged home in stable condition. Follow up with PCP to evaluate treatment results or return if symptoms worsen or fail to improve. Discussed signs and symptoms which require immediate follow-up in ED/call to 911. Understanding verbalized. I have reviewed the patient's medical history in detail and updated the computerized patient record.     Fani Avendano, PRASHANT - CNP

## 2022-12-05 ASSESSMENT — ENCOUNTER SYMPTOMS
SORE THROAT: 0
WHEEZING: 0
NAUSEA: 1
SHORTNESS OF BREATH: 0
VOMITING: 1
COUGH: 0
DIARRHEA: 0
RHINORRHEA: 0

## 2022-12-05 NOTE — TELEPHONE ENCOUNTER
RECEIVED FAX FROM [facility] THAT METOPROLOL HCTZ NEEDS A PA. CHART DOES NOT REFLECT THE COMBINATION,SPOKE WITH DR Javad Porras AFTER READING OFFICE NOTES AND HE DECIDED TO NOT GIVE THE HCTZ AT THIS TIME.  PATIENT AWARE

## 2022-12-13 ENCOUNTER — OFFICE VISIT (OUTPATIENT)
Dept: FAMILY MEDICINE CLINIC | Age: 67
End: 2022-12-13
Payer: MEDICARE

## 2022-12-13 VITALS
SYSTOLIC BLOOD PRESSURE: 136 MMHG | TEMPERATURE: 97.4 F | HEART RATE: 67 BPM | DIASTOLIC BLOOD PRESSURE: 78 MMHG | OXYGEN SATURATION: 98 % | WEIGHT: 202.4 LBS | RESPIRATION RATE: 16 BRPM | HEIGHT: 60 IN | BODY MASS INDEX: 39.74 KG/M2

## 2022-12-13 DIAGNOSIS — R42 VERTIGO: ICD-10-CM

## 2022-12-13 DIAGNOSIS — Z23 NEED FOR PNEUMOCOCCAL VACCINE: ICD-10-CM

## 2022-12-13 DIAGNOSIS — Z99.89 OSA ON CPAP: ICD-10-CM

## 2022-12-13 DIAGNOSIS — E78.5 DYSLIPIDEMIA: ICD-10-CM

## 2022-12-13 DIAGNOSIS — I10 ESSENTIAL HYPERTENSION: ICD-10-CM

## 2022-12-13 DIAGNOSIS — Z12.31 BREAST CANCER SCREENING BY MAMMOGRAM: ICD-10-CM

## 2022-12-13 DIAGNOSIS — H66.91 RIGHT OTITIS MEDIA, UNSPECIFIED OTITIS MEDIA TYPE: ICD-10-CM

## 2022-12-13 DIAGNOSIS — H65.93 FLUID LEVEL BEHIND TYMPANIC MEMBRANE OF BOTH EARS: ICD-10-CM

## 2022-12-13 DIAGNOSIS — G47.33 OSA ON CPAP: ICD-10-CM

## 2022-12-13 DIAGNOSIS — E11.40 TYPE 2 DIABETES MELLITUS WITH DIABETIC NEUROPATHY, WITHOUT LONG-TERM CURRENT USE OF INSULIN (HCC): Primary | ICD-10-CM

## 2022-12-13 PROCEDURE — 2022F DILAT RTA XM EVC RTNOPTHY: CPT | Performed by: NURSE PRACTITIONER

## 2022-12-13 PROCEDURE — 3074F SYST BP LT 130 MM HG: CPT | Performed by: NURSE PRACTITIONER

## 2022-12-13 PROCEDURE — 1090F PRES/ABSN URINE INCON ASSESS: CPT | Performed by: NURSE PRACTITIONER

## 2022-12-13 PROCEDURE — G8417 CALC BMI ABV UP PARAM F/U: HCPCS | Performed by: NURSE PRACTITIONER

## 2022-12-13 PROCEDURE — 3051F HG A1C>EQUAL 7.0%<8.0%: CPT | Performed by: NURSE PRACTITIONER

## 2022-12-13 PROCEDURE — 1036F TOBACCO NON-USER: CPT | Performed by: NURSE PRACTITIONER

## 2022-12-13 PROCEDURE — G8399 PT W/DXA RESULTS DOCUMENT: HCPCS | Performed by: NURSE PRACTITIONER

## 2022-12-13 PROCEDURE — 90677 PCV20 VACCINE IM: CPT | Performed by: NURSE PRACTITIONER

## 2022-12-13 PROCEDURE — 3017F COLORECTAL CA SCREEN DOC REV: CPT | Performed by: NURSE PRACTITIONER

## 2022-12-13 PROCEDURE — G0009 ADMIN PNEUMOCOCCAL VACCINE: HCPCS | Performed by: NURSE PRACTITIONER

## 2022-12-13 PROCEDURE — 1123F ACP DISCUSS/DSCN MKR DOCD: CPT | Performed by: NURSE PRACTITIONER

## 2022-12-13 PROCEDURE — G8427 DOCREV CUR MEDS BY ELIG CLIN: HCPCS | Performed by: NURSE PRACTITIONER

## 2022-12-13 PROCEDURE — G8484 FLU IMMUNIZE NO ADMIN: HCPCS | Performed by: NURSE PRACTITIONER

## 2022-12-13 PROCEDURE — 3078F DIAST BP <80 MM HG: CPT | Performed by: NURSE PRACTITIONER

## 2022-12-13 PROCEDURE — 99214 OFFICE O/P EST MOD 30 MIN: CPT | Performed by: NURSE PRACTITIONER

## 2022-12-13 RX ORDER — AZITHROMYCIN 250 MG/1
TABLET, FILM COATED ORAL
Qty: 6 TABLET | Refills: 1 | Status: SHIPPED | OUTPATIENT
Start: 2022-12-13 | End: 2022-12-23

## 2022-12-13 NOTE — PROGRESS NOTES
Subjective:     Diabetes Mellitus Type 2: Current symptoms/problems include neuropathy. Home blood sugar records:  trend: stable  Any episodes of hypoglycemia? no  Tobacco history: She  reports that she has never smoked. She has never been exposed to tobacco smoke. She has never used smokeless tobacco.   Known diabetic complications: peripheral neuropathy  She states that her favorite foods no longer taste good to her since her sense of taste is changed. She does not eat much of anything anymore but there are some things that she can tolerate more than others. Hypertension/Palpitations:  Home blood pressure monitoring: Yes - stable. She is adherent to a low sodium diet. Antihypertensive medication side effects: no medication side effects noted. Use of agents associated with hypertension: none. She continues to follow with Dr. Génesis Edwards. Very few palpitations with higher dose of Toprol. Borderline low potassium. Will consider over the counter supplement. Hyperlipidemia:  No new myalgias or GI upset on atorvastatin (Lipitor). He continues to get several steps per day typically averaging 6-10,000. Vertigo: started just after Thanksgiving and was associated with nausea and vomiting. Was seen in [facility] care. Given meclizine and Zofran. Still has nausea but no vomiting. She had this one other time in the past, earlier this year when she hit her head while mowing grass. Last time symptoms did not last as long. She states that this time she has some pain behind both of her ears. Has been having some sinus pressure and a lot of postnasal drip/sinus drainage. No fever or chills. No body aches. Sleep apnea: Anders Myers has obstructive sleep apnea that is treated with CPAP. The CPAP is used  7 hours 7 nights per week. The CPAP use helps the daytime sleepiness and low energy levels. She continues to follow with sleep medicine. She now has a nasal pillow and this works better for her.      The five diabetic measures for control:   Hemoglobin A1C (%)   Date Value   11/23/2022 7.4 (H)     LDL Calculated (mg/dL)   Date Value   11/23/2022 39         Blood pressure less than 131/81,   BP Readings from Last 1 Encounters:   12/13/22 136/78     Smoking, non smoker is goal. This pt is not a smoker. This pt does an aspirin a day. PMH: This 79 y.o. female  patient is  hypertensive, is  diabetic, is  hyperlipidemic. She has no history of smoking and has a  family history of heart disease. She is  obese. Review of Systems  Patient denies chest pain, shortness of breath, lightheadedness, blurred vision, and palpitations. Eyes: no rapid change in vision  Ears, nose, mouth, throat, and face: no changes in hearing or sore throat  Respiratory: No shortness of breath or cough. Cardiovascular: no chest pain or pressure. This patient reports no polyuria, polydipsia or episodes of hypoglycemia. Treatment Adherence:   Medication compliance:  compliant most of the time  Diet compliance:  compliant most of the time  Weight trend: stable  Current exercise: walks 1 time(s) per day  What might prevent you from meeting your goal?: none  Patient plan for overcoming barriers: N/A     Patient Confidence: 8/10      Objective:   EXAM:  Constitutional Blood pressure 136/78, pulse 67, temperature 97.4 °F (36.3 °C), temperature source Temporal, resp. rate 16, height 5' (1.524 m), weight 202 lb 6.4 oz (91.8 kg), SpO2 98 %, not currently breastfeeding. .  She has a normal affect, no acute distress, appears well developed and well nourished. Ears:  TM- right-  injected and fluid-  dark yellow and left-  fluid-  white and Canal- normal  Nose/Sinuses:  positive findings: mucosa erythematous and swollen  Mouth/Throat:  Mucosa moist.  No lesions. Pharynx without erythema, edema or exudate. Neck:  neck- supple, no mass, non-tender and no bruits  Lungs:  Normal expansion. Clear to auscultation.   No rales, rhonchi, or wheezing., No chest wall tenderness. Heart:  Heart sounds are normal.  Regular rate and rhythm without murmur, gallop or rub. Abdomen:  Soft, non-tender, normal bowel sounds. No bruits, organomegaly or masses. Extremities: Extremities warm to touch, pink, with no edema. Assessment:      Diagnosis Orders   1. Type 2 diabetes mellitus with diabetic neuropathy, without long-term current use of insulin ([address]        2. Essential hypertension        3. Dyslipidemia        4. Vertigo        5. LYNNE on CPAP        6. Breast cancer screening by mammogram  JAMILA DIGITAL SCREEN W OR WO CAD BILATERAL      7. Fluid level behind tympanic membrane of both ears        8. Right otitis media, unspecified otitis media type  azithromycin (ZITHROMAX) 250 MG tablet      9. Need for pneumococcal vaccine  Pneumococcal, PCV20, PREVNAR 20, (age 25 yrs+), IM, PF          PLAN: Include orders in the DX section. Diabetes Counseling   Patient was counseled regarding disease risks and adopting healthy behaviors. Patient was provided education materials to assist with self management. Patient was provided log (or received log during previous visit) to record blood pressure, food intake and/or blood sugar. Patient was instructed to keep log up-to-date and to always bring log to all office visits. 1.  2.  3.  Reviewed most recent blood test results with improving hemoglobin A1c although above goal.  Blood pressure is well controlled on current medication and she continues to follow routinely with [facility] cardiology with no recent significant palpitations. Lipid panel well managed overall on statin. Discussed adding healthy fats to the diet and continuing routine physical activity. 4.  7.  8.  Vertigo likely symptomatic from ear infection/effusion. She has Flonase at home and will use daily and will add antibiotic. Notify me if symptoms worsen or do not improve. 5.  She is compliant with CPAP and recently has a new machine.   We will continue to follow routinely with [address] specialist.    6. Order for mammogram given which is due in February of next year and we discussed pneumonia vaccine and given during visit today. Please note this report has been partially produced using speech recognition software and may cause contain errors related to that system including grammar, punctuation and spelling as well as words and phrases that may seem inappropriate. If there are questions or concerns please feel free to contact me to clarify.       Electronically signed by PRASHANT Britt - CNP-CNP, 7:57 AM 12/13/22

## 2023-01-04 ENCOUNTER — HOSPITAL ENCOUNTER (OUTPATIENT)
Dept: ULTRASOUND IMAGING | Age: 68
Discharge: HOME | End: 2023-01-06
Payer: MEDICARE

## 2023-01-04 DIAGNOSIS — R09.89 BRUIT: ICD-10-CM

## 2023-01-04 DIAGNOSIS — I73.9 CLAUDICATION (HCC): ICD-10-CM

## 2023-01-04 PROCEDURE — 93924 LWR XTR VASC STDY BILAT: CPT | Performed by: INTERNAL MEDICINE

## 2023-01-04 PROCEDURE — 93924 LWR XTR VASC STDY BILAT: CPT

## 2023-01-04 PROCEDURE — 93880 EXTRACRANIAL BILAT STUDY: CPT

## 2023-01-05 ENCOUNTER — OFFICE VISIT (OUTPATIENT)
Dept: PULMONOLOGY | Age: 68
End: 2023-01-05
Payer: MEDICARE

## 2023-01-05 VITALS
WEIGHT: 206 LBS | SYSTOLIC BLOOD PRESSURE: 126 MMHG | TEMPERATURE: 97.4 F | HEART RATE: 76 BPM | DIASTOLIC BLOOD PRESSURE: 76 MMHG | HEIGHT: 60 IN | OXYGEN SATURATION: 97 % | BODY MASS INDEX: 40.44 KG/M2

## 2023-01-05 DIAGNOSIS — G47.33 OSA ON CPAP: Primary | ICD-10-CM

## 2023-01-05 DIAGNOSIS — Z99.89 OSA ON CPAP: Primary | ICD-10-CM

## 2023-01-05 DIAGNOSIS — E66.01 OBESITY, CLASS III, BMI 40-49.9 (MORBID OBESITY) (HCC): ICD-10-CM

## 2023-01-05 PROCEDURE — G8484 FLU IMMUNIZE NO ADMIN: HCPCS | Performed by: INTERNAL MEDICINE

## 2023-01-05 PROCEDURE — G8427 DOCREV CUR MEDS BY ELIG CLIN: HCPCS | Performed by: INTERNAL MEDICINE

## 2023-01-05 PROCEDURE — 3078F DIAST BP <80 MM HG: CPT | Performed by: INTERNAL MEDICINE

## 2023-01-05 PROCEDURE — 1090F PRES/ABSN URINE INCON ASSESS: CPT | Performed by: INTERNAL MEDICINE

## 2023-01-05 PROCEDURE — G8399 PT W/DXA RESULTS DOCUMENT: HCPCS | Performed by: INTERNAL MEDICINE

## 2023-01-05 PROCEDURE — 1123F ACP DISCUSS/DSCN MKR DOCD: CPT | Performed by: INTERNAL MEDICINE

## 2023-01-05 PROCEDURE — G8417 CALC BMI ABV UP PARAM F/U: HCPCS | Performed by: INTERNAL MEDICINE

## 2023-01-05 PROCEDURE — 99213 OFFICE O/P EST LOW 20 MIN: CPT | Performed by: INTERNAL MEDICINE

## 2023-01-05 PROCEDURE — 3074F SYST BP LT 130 MM HG: CPT | Performed by: INTERNAL MEDICINE

## 2023-01-05 PROCEDURE — 3017F COLORECTAL CA SCREEN DOC REV: CPT | Performed by: INTERNAL MEDICINE

## 2023-01-05 PROCEDURE — 1036F TOBACCO NON-USER: CPT | Performed by: INTERNAL MEDICINE

## 2023-01-05 NOTE — PROGRESS NOTES
PATIENT VISIT-PULMONARY/SLEEP    1/5/2023     REFERRING PHYSICIAN:  Zuly Hopkins, APRN - CNP     REASON FOR REFERRAL:  LYNNE    HPI:     Javier Veliz is a 79 y.o. female who was referred to pulmonary and sleep clinic for evaluation. She has been diagnosed with LYNNE in 2009 and has been on CPAP since then. Does not know her pressure but has been compliant. Has been using the same machine since then. Her weight is essentially about the same. She benefits from the machine. She cannot sleep without it. She has been noticing that the machine is making some noises lately. The machine is 15years old. She has been ordering her supplies from [employer]. Has minimal shortness of breath with activities. Denies any cough or wheezing. Has been diagnosed with asthma in the past.  Had maintenance inhalers in the past but currently only on Singulair. Has not been using her rescue inhalers. 8/15/22:    She comes back for follow-up. She underwent a diagnostic sleep study which showed moderate obstructive sleep apnea. However, respiratory events were severe and REM. REM AHI was 43. She had mild desaturations. She had poor sleep efficiency. Respiratory status has been good overall. Continues to be on Singulair. 1/5/23:    He comes back for follow-up. He underwent CPAP titration study. CPAP pressure of 7 cm H2O was adequate. CPAP was prescribed for her and she has received it. She has been using the machine regularly for the last months. She is averaging about 6 hours. AHI is normal on the machine. She feels great with the machine. She feels refreshed and she does not doze off during the day.       Past Medical History   Past Medical History:   Diagnosis Date    Asthma     CAD (coronary artery disease)     Hypertension     Kidney stone     Sleep apnea     wears cpap    Type 2 diabetes mellitus without complication [facility])        Past Surgical History  Past Surgical History:   Procedure Laterality Date    CARPAL TUNNEL RELEASE Bilateral 80's    carpal tunnel both wrists and right foot, POLLACK'S NEUROMA    COLONOSCOPY N/A 7/27/2022    COLORECTAL CANCER SCREENING, HIGH RISK performed by Brady Renteria MD at [facility] Right 03/2017    had film removed on right eye    JOINT REPLACEMENT Left 09/22/2010    left thumb replacement    KNEE SURGERY Bilateral     torn meniscus 1/16/2009, 2/12/2009, 2/2016    KNEE SURGERY Left 02/2016    OVARIAN CYST REMOVAL  05/13/2008    ROTATOR CUFF REPAIR Left 01/08/2004    ROTATOR CUFF REPAIR Right 08/22/2005    TONSILLECTOMY AND ADENOIDECTOMY  1963       Allergies  Allergies   Allergen Reactions    Salsalate Other (See Comments)     causes pt to lose hearing    Molds & Smuts      Causes sneezing and asthma    Percocet [Oxycodone-Acetaminophen] Nausea And Vomiting       Medications  Current Outpatient Medications   Medication Sig Dispense Refill    ondansetron (ZOFRAN-ODT) 4 MG disintegrating tablet Take 1 tablet by mouth 3 times daily as needed for Nausea or Vomiting 21 tablet 0    chlorthalidone (HYGROTON) 25 MG tablet Take 1 tablet by mouth daily 180 tablet 5    metoprolol succinate (TOPROL XL) 100 MG extended release tablet Take 1.5 tablets by mouth daily 180 tablet 5    dilTIAZem (CARDIZEM CD) 120 MG extended release capsule Take 1 capsule by mouth daily 180 capsule 5    atorvastatin (LIPITOR) 20 MG tablet Take 1 tablet by mouth nightly 180 tablet 5    montelukast (SINGULAIR) 10 MG tablet Take 1 tablet by mouth nightly 90 tablet 2    metFORMIN (GLUCOPHAGE) 1000 MG tablet TAKE 1 TABLET TWICE A DAY WITH MEALS (NEED APPOINTMENT FOR FURTHER REFILLS) 180 tablet 1    losartan (COZAAR) 25 MG tablet Take 1 tablet by mouth daily 90 tablet 2    glimepiride (AMARYL) 2 MG tablet TAKE 1 TABLET BY MOUTH  EVERY MORNING BEFORE  BREAKFAST AND EVERY EVENING BEFORE DINNER. 180 tablet 2    blood glucose monitor strips Test 2 times a day & as needed for symptoms of irregular blood glucose. Dispense sufficient amount for indicated testing frequency plus additional to accommodate PRN testing needs. One touch meter. 100 strip 3    ibuprofen (ADVIL;MOTRIN) 600 MG tablet Take 1 tablet by mouth 4 times daily as needed for Pain 120 tablet 1    Fexofenadine-Pseudoephedrine (ALLEGRA-D 24 HOUR PO) Take by mouth      Coenzyme Q10 (CO Q 10) 100 MG CAPS Take 1 capsule by mouth daily 30 capsule 11    VENTOLIN  (90 Base) MCG/ACT inhaler INHALE 2 PUFFS EVERY 4 HOURS AS NEEDED 1 each 3    Omega-3 Fatty Acids (FISH OIL) 1200 MG CAPS Take by mouth      Multiple Vitamins-Minerals (MULTIVITAMIN & MINERAL PO) Take by mouth      aspirin 81 MG tablet Take 81 mg by mouth       No current facility-administered medications for this visit. Social History  Social History     Tobacco Use    Smoking status: Never     Passive exposure: Never    Smokeless tobacco: Never   Substance Use Topics    Alcohol use: Yes     Comment: occassionally       Family History  Family History   Problem Relation Age of Onset    Lung Cancer Mother     Diabetes Mother     Hypertension Mother     Diabetes Father     Hypertension Father     Hypertension Brother     Diabetes Brother     Breast Cancer Maternal Aunt        Review of Systems  All review of systems has been obtained and negative other than what was mentionedin HPI. Physical Exam                 Vitals:    01/05/23 0903   BP: 126/76   Pulse: 76   Temp: 97.4 °F (36.3 °C)   TempSrc: Tympanic   SpO2: 97%   Weight: 206 lb (93.4 kg)   Height: 5' (1.524 m)          General appearance: Well appearing. No acute distress. AAOX3  Head: Normocephalic, without obvious abnormality, atraumatic   Eyes:Pupils bilateral equal and reactive, EOM intact. Normal sclera and conjunctiva   Nose: Mucosa pink  Throat: Clear,  [place] 3  Neck: Supple, No JVD. Nothyromegaly. Neck is thick  Lungs: Clear bilaterally. No wheezing. No crackles. No use of accessory muscles.    Heart: RRR, S1, S2 normal, no murmur, click, rub or gallop   Abdomen: soft, non-tender, nondistended. Bowel sounds normal. No hernia. No organomegaly. Extremities: extremities normal, atraumatic, no cyanosis, no edema  Skin: Skin color, texture, turgor normal. No rashes or lesions   Neurological: No focal deficits,cranial nerves grossly intact. No weakness. Sensation normal   Psych: Normal Mood  Musculoskeletal: No joint abnormalities. Impression:   Diagnosis Orders   1. LYNNE on CPAP        2. Obesity, Class III, BMI 40-49.9 (morbid obesity) ([facility] [address]                  Recommendations:     - I went over the results of her sleep study in details and answered all her questions. - Her compliance is great. Continue CPAP at the same pressure with the same compliance  - Prescribed supplies for next year. -   Weight loss and exercise has been advised. -Follow-up in 1 year     Return in about 1 year (around 1/5/2024).           Electronically signed by Shayla Kitchen MD on 1/5/2023 at 9:50 AM

## 2023-03-02 ENCOUNTER — HOSPITAL ENCOUNTER (OUTPATIENT)
Dept: WOMENS IMAGING | Age: 68
Discharge: HOME | End: 2023-03-04
Payer: MEDICARE

## 2023-03-02 DIAGNOSIS — Z12.31 BREAST CANCER SCREENING BY MAMMOGRAM: ICD-10-CM

## 2023-03-02 PROCEDURE — 77063 BREAST TOMOSYNTHESIS BI: CPT

## 2023-03-03 DIAGNOSIS — Z12.31 ENCOUNTER FOR SCREENING MAMMOGRAM FOR BREAST CANCER: Primary | ICD-10-CM

## 2023-04-04 ENCOUNTER — HOSPITAL ENCOUNTER (OUTPATIENT)
Dept: MRI IMAGING | Age: 68
Discharge: HOME | End: 2023-04-06
Payer: MEDICARE

## 2023-04-04 DIAGNOSIS — S43.409A: ICD-10-CM

## 2023-04-04 PROCEDURE — 73221 MRI JOINT UPR EXTREM W/O DYE: CPT

## 2023-04-14 PROBLEM — I73.9 CLAUDICATION (HCC): Status: ACTIVE | Noted: 2023-04-14

## 2023-05-02 LAB — DIABETIC RETINOPATHY: NEGATIVE

## 2023-05-24 ENCOUNTER — OFFICE VISIT (OUTPATIENT)
Dept: CARDIOLOGY CLINIC | Age: 68
End: 2023-05-24
Payer: MEDICARE

## 2023-05-24 VITALS
HEIGHT: 59 IN | BODY MASS INDEX: 42.03 KG/M2 | RESPIRATION RATE: 16 BRPM | HEART RATE: 72 BPM | OXYGEN SATURATION: 98 % | WEIGHT: 208.5 LBS | DIASTOLIC BLOOD PRESSURE: 84 MMHG | SYSTOLIC BLOOD PRESSURE: 132 MMHG

## 2023-05-24 DIAGNOSIS — E11.40 TYPE 2 DIABETES MELLITUS WITH DIABETIC NEUROPATHY, UNSPECIFIED WHETHER LONG TERM INSULIN USE (HCC): ICD-10-CM

## 2023-05-24 DIAGNOSIS — I10 ESSENTIAL HYPERTENSION: Primary | ICD-10-CM

## 2023-05-24 PROCEDURE — G8427 DOCREV CUR MEDS BY ELIG CLIN: HCPCS | Performed by: INTERNAL MEDICINE

## 2023-05-24 PROCEDURE — 99213 OFFICE O/P EST LOW 20 MIN: CPT | Performed by: INTERNAL MEDICINE

## 2023-05-24 PROCEDURE — 1123F ACP DISCUSS/DSCN MKR DOCD: CPT | Performed by: INTERNAL MEDICINE

## 2023-05-24 PROCEDURE — 1036F TOBACCO NON-USER: CPT | Performed by: INTERNAL MEDICINE

## 2023-05-24 PROCEDURE — 3046F HEMOGLOBIN A1C LEVEL >9.0%: CPT | Performed by: INTERNAL MEDICINE

## 2023-05-24 PROCEDURE — 3079F DIAST BP 80-89 MM HG: CPT | Performed by: INTERNAL MEDICINE

## 2023-05-24 PROCEDURE — G8417 CALC BMI ABV UP PARAM F/U: HCPCS | Performed by: INTERNAL MEDICINE

## 2023-05-24 PROCEDURE — 3017F COLORECTAL CA SCREEN DOC REV: CPT | Performed by: INTERNAL MEDICINE

## 2023-05-24 PROCEDURE — 2022F DILAT RTA XM EVC RTNOPTHY: CPT | Performed by: INTERNAL MEDICINE

## 2023-05-24 PROCEDURE — 1090F PRES/ABSN URINE INCON ASSESS: CPT | Performed by: INTERNAL MEDICINE

## 2023-05-24 PROCEDURE — G8399 PT W/DXA RESULTS DOCUMENT: HCPCS | Performed by: INTERNAL MEDICINE

## 2023-05-24 PROCEDURE — 3075F SYST BP GE 130 - 139MM HG: CPT | Performed by: INTERNAL MEDICINE

## 2023-05-24 PROCEDURE — 93000 ELECTROCARDIOGRAM COMPLETE: CPT | Performed by: INTERNAL MEDICINE

## 2023-05-24 RX ORDER — SODIUM CHLORIDE 0.9 % (FLUSH) 0.9 %
5-40 SYRINGE (ML) INJECTION PRN
OUTPATIENT
Start: 2023-05-24

## 2023-05-24 RX ORDER — SODIUM CHLORIDE 9 MG/ML
INJECTION, SOLUTION INTRAVENOUS PRN
OUTPATIENT
Start: 2023-05-24

## 2023-05-24 RX ORDER — SODIUM CHLORIDE 0.9 % (FLUSH) 0.9 %
5-40 SYRINGE (ML) INJECTION EVERY 12 HOURS SCHEDULED
OUTPATIENT
Start: 2023-05-24

## 2023-05-24 ASSESSMENT — ENCOUNTER SYMPTOMS
CHEST TIGHTNESS: 0
SHORTNESS OF BREATH: 0
CONSTIPATION: 0
COUGH: 0
VOMITING: 0
WHEEZING: 0
RHINORRHEA: 0
NAUSEA: 0
COLOR CHANGE: 0
DIARRHEA: 0
APNEA: 0
ABDOMINAL PAIN: 0
EYE REDNESS: 0

## 2023-05-24 NOTE — PROGRESS NOTES
clinic in 3 months    Recommended patient to eat diets that emphasize high intakes of vegetables, fruits, nuts, whole grains, lean vegetable or animal protein, and fish. As per 2019 ACC/AHA guideline on primary prevention of CVD     Recommended patient to engage in at least 150 minutes per week of accumulated moderate-intensity physical activity or 75 minutes per week of vigorous-intensity physical activity as per 2019 ACC/AHA guideline on primary prevention of CVD           Continue medications at current doses.

## 2023-06-05 ENCOUNTER — HOSPITAL ENCOUNTER (OUTPATIENT)
Dept: LAB | Age: 68
Discharge: HOME | End: 2023-06-05
Payer: MEDICARE

## 2023-06-05 DIAGNOSIS — E11.65 TYPE 2 DIABETES MELLITUS WITH HYPERGLYCEMIA, WITHOUT LONG-TERM CURRENT USE OF INSULIN (HCC): ICD-10-CM

## 2023-06-05 DIAGNOSIS — I10 ESSENTIAL HYPERTENSION: ICD-10-CM

## 2023-06-05 LAB
ALBUMIN SERPL-MCNC: 4.1 G/DL (ref 3.5–4.6)
ALP SERPL-CCNC: 86 U/L (ref 40–130)
ALT SERPL-CCNC: 21 U/L (ref 0–33)
ANION GAP SERPL CALCULATED.3IONS-SCNC: 12 MEQ/L (ref 9–15)
AST SERPL-CCNC: 23 U/L (ref 0–35)
BILIRUB SERPL-MCNC: 0.7 MG/DL (ref 0.2–0.7)
BUN SERPL-MCNC: 15 MG/DL (ref 8–23)
CALCIUM SERPL-MCNC: 9.2 MG/DL (ref 8.5–9.9)
CHLORIDE SERPL-SCNC: 99 MEQ/L (ref 95–107)
CO2 SERPL-SCNC: 28 MEQ/L (ref 20–31)
CREAT SERPL-MCNC: 0.62 MG/DL (ref 0.5–0.9)
CREAT UR-MCNC: 112.7 MG/DL
ERYTHROCYTE [DISTWIDTH] IN BLOOD BY AUTOMATED COUNT: 15.1 % (ref 11.5–14.5)
GLOBULIN SER CALC-MCNC: 3 G/DL (ref 2.3–3.5)
GLUCOSE SERPL-MCNC: 247 MG/DL (ref 70–99)
HBA1C MFR BLD: 9.2 % (ref 4.8–5.9)
HCT VFR BLD AUTO: 41.7 % (ref 37–47)
HGB BLD-MCNC: 14.4 G/DL (ref 12–16)
INR PPP: 1.1
MCH RBC QN AUTO: 29.9 PG (ref 27–31.3)
MCHC RBC AUTO-ENTMCNC: 34.5 % (ref 33–37)
MCV RBC AUTO: 86.6 FL (ref 79.4–94.8)
MICROALBUMIN UR-MCNC: <1.2 MG/DL
MICROALBUMIN/CREAT UR-RTO: NORMAL MG/G (ref 0–30)
PLATELET # BLD AUTO: 298 K/UL (ref 130–400)
POTASSIUM SERPL-SCNC: 3.6 MEQ/L (ref 3.4–4.9)
PROT SERPL-MCNC: 7.1 G/DL (ref 6.3–8)
PROTHROMBIN TIME: 14 SEC (ref 12.3–14.9)
RBC # BLD AUTO: 4.81 M/UL (ref 4.2–5.4)
SODIUM SERPL-SCNC: 139 MEQ/L (ref 135–144)
WBC # BLD AUTO: 8.1 K/UL (ref 4.8–10.8)

## 2023-06-05 PROCEDURE — 85027 COMPLETE CBC AUTOMATED: CPT

## 2023-06-05 PROCEDURE — 85610 PROTHROMBIN TIME: CPT

## 2023-06-05 PROCEDURE — 82043 UR ALBUMIN QUANTITATIVE: CPT

## 2023-06-05 PROCEDURE — 36415 COLL VENOUS BLD VENIPUNCTURE: CPT

## 2023-06-05 PROCEDURE — 83036 HEMOGLOBIN GLYCOSYLATED A1C: CPT

## 2023-06-05 PROCEDURE — 80053 COMPREHEN METABOLIC PANEL: CPT

## 2023-06-05 PROCEDURE — 82570 ASSAY OF URINE CREATININE: CPT

## 2023-06-06 DIAGNOSIS — E11.65 TYPE 2 DIABETES MELLITUS WITH HYPERGLYCEMIA, WITHOUT LONG-TERM CURRENT USE OF INSULIN (HCC): Primary | ICD-10-CM

## 2023-06-08 ENCOUNTER — TELEPHONE (OUTPATIENT)
Dept: CARDIOLOGY CLINIC | Age: 68
End: 2023-06-08

## 2023-06-08 ENCOUNTER — HOSPITAL ENCOUNTER (OUTPATIENT)
Dept: CARDIAC CATH/INVASIVE PROCEDURES | Age: 68
Discharge: HOME | End: 2023-06-08
Attending: INTERNAL MEDICINE | Admitting: INTERNAL MEDICINE
Payer: MEDICARE

## 2023-06-08 VITALS
TEMPERATURE: 97.4 F | SYSTOLIC BLOOD PRESSURE: 128 MMHG | WEIGHT: 204.2 LBS | HEART RATE: 58 BPM | BODY MASS INDEX: 41.16 KG/M2 | RESPIRATION RATE: 18 BRPM | HEIGHT: 59 IN | DIASTOLIC BLOOD PRESSURE: 67 MMHG | OXYGEN SATURATION: 93 %

## 2023-06-08 PROBLEM — I25.83 CORONARY ARTERY DISEASE DUE TO LIPID RICH PLAQUE: Status: ACTIVE | Noted: 2023-06-08

## 2023-06-08 PROBLEM — I25.10 CORONARY ARTERY DISEASE DUE TO LIPID RICH PLAQUE: Status: ACTIVE | Noted: 2023-06-08

## 2023-06-08 PROCEDURE — C1769 GUIDE WIRE: HCPCS

## 2023-06-08 PROCEDURE — 6360000004 HC RX CONTRAST MEDICATION: Performed by: INTERNAL MEDICINE

## 2023-06-08 PROCEDURE — 2709999900 HC NON-CHARGEABLE SUPPLY

## 2023-06-08 PROCEDURE — 93458 L HRT ARTERY/VENTRICLE ANGIO: CPT

## 2023-06-08 PROCEDURE — 2500000003 HC RX 250 WO HCPCS

## 2023-06-08 PROCEDURE — C1894 INTRO/SHEATH, NON-LASER: HCPCS

## 2023-06-08 PROCEDURE — 6360000002 HC RX W HCPCS

## 2023-06-08 RX ORDER — LABETALOL HYDROCHLORIDE 5 MG/ML
10 INJECTION, SOLUTION INTRAVENOUS EVERY 30 MIN PRN
Status: DISCONTINUED | OUTPATIENT
Start: 2023-06-08 | End: 2023-06-08 | Stop reason: HOSPADM

## 2023-06-08 RX ORDER — SODIUM CHLORIDE 9 MG/ML
INJECTION, SOLUTION INTRAVENOUS
Status: DISCONTINUED
Start: 2023-06-08 | End: 2023-06-08 | Stop reason: HOSPADM

## 2023-06-08 RX ORDER — SODIUM CHLORIDE 0.9 % (FLUSH) 0.9 %
5-40 SYRINGE (ML) INJECTION EVERY 12 HOURS SCHEDULED
Status: DISCONTINUED | OUTPATIENT
Start: 2023-06-08 | End: 2023-06-08 | Stop reason: HOSPADM

## 2023-06-08 RX ORDER — SODIUM CHLORIDE 9 MG/ML
INJECTION, SOLUTION INTRAVENOUS PRN
Status: DISCONTINUED | OUTPATIENT
Start: 2023-06-08 | End: 2023-06-08 | Stop reason: HOSPADM

## 2023-06-08 RX ORDER — MIDAZOLAM HYDROCHLORIDE 2 MG/2ML
2 INJECTION, SOLUTION INTRAMUSCULAR; INTRAVENOUS
Status: DISCONTINUED | OUTPATIENT
Start: 2023-06-08 | End: 2023-06-08 | Stop reason: HOSPADM

## 2023-06-08 RX ORDER — ACETAMINOPHEN 325 MG/1
650 TABLET ORAL EVERY 4 HOURS PRN
Status: DISCONTINUED | OUTPATIENT
Start: 2023-06-08 | End: 2023-06-08 | Stop reason: HOSPADM

## 2023-06-08 RX ORDER — SODIUM CHLORIDE 0.9 % (FLUSH) 0.9 %
5-40 SYRINGE (ML) INJECTION PRN
Status: DISCONTINUED | OUTPATIENT
Start: 2023-06-08 | End: 2023-06-08 | Stop reason: HOSPADM

## 2023-06-08 RX ORDER — FENTANYL CITRATE 0.05 MG/ML
25 INJECTION, SOLUTION INTRAMUSCULAR; INTRAVENOUS
Status: DISCONTINUED | OUTPATIENT
Start: 2023-06-08 | End: 2023-06-08 | Stop reason: HOSPADM

## 2023-06-08 RX ORDER — MORPHINE SULFATE 2 MG/ML
2 INJECTION, SOLUTION INTRAMUSCULAR; INTRAVENOUS
Status: DISCONTINUED | OUTPATIENT
Start: 2023-06-08 | End: 2023-06-08 | Stop reason: HOSPADM

## 2023-06-08 RX ORDER — SODIUM CHLORIDE 9 MG/ML
INJECTION, SOLUTION INTRAVENOUS CONTINUOUS
Status: DISCONTINUED | OUTPATIENT
Start: 2023-06-08 | End: 2023-06-08 | Stop reason: HOSPADM

## 2023-06-08 RX ORDER — ONDANSETRON 2 MG/ML
4 INJECTION INTRAMUSCULAR; INTRAVENOUS EVERY 6 HOURS PRN
Status: DISCONTINUED | OUTPATIENT
Start: 2023-06-08 | End: 2023-06-08 | Stop reason: HOSPADM

## 2023-06-08 RX ORDER — HYDRALAZINE HYDROCHLORIDE 20 MG/ML
10 INJECTION INTRAMUSCULAR; INTRAVENOUS EVERY 10 MIN PRN
Status: DISCONTINUED | OUTPATIENT
Start: 2023-06-08 | End: 2023-06-08 | Stop reason: HOSPADM

## 2023-06-08 RX ADMIN — IOPAMIDOL 42 ML: 612 INJECTION, SOLUTION INTRAVENOUS at 10:53

## 2023-06-08 NOTE — BRIEF OP NOTE
Section of Cardiology  Adult Brief Cardiac Cath Procedure Note        Procedure(s):  LHC, b/l coronary angio, LV gram    Pre-operative Diagnosis: V. tach    H&P Status: Completed and reviewed.      Post-operative Diagnosis: Mild CAD    Findings:  See full report  Right dominant system  Left main: Big size vessel normal  LAD: Big size vessel mild disease  Circumflex: Big size tortuous vessel mild disease  RCA: Big size dominant vessel mild disease  LVEDP 9 mmHg  No aortic valve gradient on catheter pullback  LVEF 65% by LV gram    Right radial access    Complications:  none    Recommendations:  Transfer patient back to holding area for post diagnostic cath management  Maximize medical therapy   Continue aspirin and high intensity statin indefinitely for secondary prevention of CAD  Continue beta-blocker and ACE inhibitor  Bedrest for 2 hours  IV hydration at 75 cc/h to complete 1 L   Follow-up with me in clinic in 1 to 2 weeks    Primary Proceduralist:   Akiko Meléndez MD    Full procedure note to follow

## 2023-06-08 NOTE — TELEPHONE ENCOUNTER
Patient Comments:  I just had a heart cath by Dr Barbara Birmingham. He wants to see me in 1 to 2 weeks.

## 2023-06-08 NOTE — PROGRESS NOTES
Patient returned from Cath Lab, R radial with elastic band. Continue to monitor.     1200 - Band removed R wrist stable    Discharge instructions reviewed, verbalizes understanding

## 2023-06-08 NOTE — TELEPHONE ENCOUNTER
Appointment For: Zion Colelis ([id number])   Visit Type: OFFICE VISIT (1004)      6/21/2023    8:15 AM  15 mins. MD Anabell Hendricks 93      Patient Comments:   I just had a heart cath by Dr Lamont Hunter. He wants to see me in 1 to 2 weeks. ----------------------------------   This appointment rescheduled from:   8/30/2023    8:30 AM  15 mins. MD Anabell Hendricks     Appointment rescheduled from [address]  ([place])  Zion Adri \"Dione\"  Patient Appointment Schedule Request Pool 1 hour ago (2:36 PM)       Appointment For: Zion Rush ([id number])  Visit Type: OFFICE VISIT (1004)     6/21/2023    8:15 AM  15 mins. MD Anabell Hendricks 93     Patient Comments:  I just had a heart cath by Dr Lamont Hunter. He wants to see me in 1 to 2 weeks. ----------------------------------  This appointment rescheduled from:  8/30/2023    8:30 AM  15 mins.   MD Anabell Hendricks 93

## 2023-06-21 ENCOUNTER — OFFICE VISIT (OUTPATIENT)
Dept: CARDIOLOGY CLINIC | Age: 68
End: 2023-06-21

## 2023-06-21 VITALS
DIASTOLIC BLOOD PRESSURE: 86 MMHG | OXYGEN SATURATION: 97 % | RESPIRATION RATE: 14 BRPM | HEIGHT: 59 IN | WEIGHT: 202.8 LBS | SYSTOLIC BLOOD PRESSURE: 132 MMHG | BODY MASS INDEX: 40.88 KG/M2 | HEART RATE: 67 BPM

## 2023-06-21 DIAGNOSIS — I25.83 CORONARY ARTERY DISEASE DUE TO LIPID RICH PLAQUE: ICD-10-CM

## 2023-06-21 DIAGNOSIS — I25.10 CORONARY ARTERY DISEASE DUE TO LIPID RICH PLAQUE: ICD-10-CM

## 2023-06-21 DIAGNOSIS — I10 ESSENTIAL HYPERTENSION: Primary | ICD-10-CM

## 2023-06-21 ASSESSMENT — ENCOUNTER SYMPTOMS
COUGH: 0
RHINORRHEA: 0
VOMITING: 0
EYE REDNESS: 0
CHEST TIGHTNESS: 0
WHEEZING: 0
NAUSEA: 0
CONSTIPATION: 0
SHORTNESS OF BREATH: 0
ABDOMINAL PAIN: 0
APNEA: 0
DIARRHEA: 0
COLOR CHANGE: 0

## 2023-06-21 NOTE — PROGRESS NOTES
Chief Complaint   Patient presents with    1 Month Follow-Up     For HTN. Post-Op Check     Heart Cath 06/08/2023. Pre-op Exam     R-Shoulder surgery [facility] for 07/18/2023       Patient presents for initial medical evaluation. Patient is followed on a regular basis by Dr. Ally Andrews, PRASHANT - CNP. Morbidly obese  Mild CAD by angiogram 6/8/2023  Diabetes  LYNNE on CPAP  Obesity  Hypertension  Mild MR  Hyperlipidemia  Recent hospitalization to [facility] on 1/21/2022 and discharged 1/22/2022 for palpitations. Nuclear stress test negative  Nuclear stress test on 1/21/2022 normal EF 82%. Inferior wall filling defect possibly breast tissue attenuation. Otherwise no ischemia noted. Echocardiogram 1/21/2022 EF 60%, severely dilated left atria, mild MR  Holter 3/1/2022 1 run 4 beats of V. tach no other major arrhythmias  Coronary angiogram 6/8/2023 mild CAD  ==================  6/21/2023  Follow-up on CAD  Earlier this month patient underwent coronary angiogram due to 4 beats of V. tach found on Holter monitor  Coronary angiogram showed mild CAD  Denies chest pain or shortness of breath  Currently on Ozempic to help her lose some weight, and experiencing some side effects      5/24/2023  Follow-up on hypertension  Patient walks 2 to 3 miles 5 times per week. No cardiac issues with this activity  Denies chest pain or shortness of breath  Patient tore his rotator cuff of right upper extremity back in February. Patient is scheduled to undergo surgery in July  Patient reports a fluttery sensation about once in the last couple months  Patient is known to have V. tach as noted on Holter 3/2022  To further investigate this I would like to bring patient to the Cath Lab to delineate coronary anatomy.   Patient with high risk factors for CAD,  I would like to schedule this procedure next week Tuesday Thursday or Friday in the morning      11/16/2022  Follow-up on palpitations  Last visit patient was recommended to

## 2023-06-29 ENCOUNTER — HOSPITAL ENCOUNTER (OUTPATIENT)
Dept: PREADMISSION TESTING | Age: 68
Discharge: HOME | End: 2023-07-03
Payer: MEDICARE

## 2023-06-29 LAB
APTT PPP: 31.4 SEC (ref 24.4–36.8)
BASOPHILS # BLD: 0 K/UL (ref 0–0.2)
BASOPHILS NFR BLD: 0.3 %
BILIRUB UR QL STRIP: NEGATIVE
CLARITY UR: CLEAR
COLOR UR: YELLOW
EOSINOPHIL # BLD: 0.1 K/UL (ref 0–0.7)
EOSINOPHIL NFR BLD: 1.4 %
ERYTHROCYTE [DISTWIDTH] IN BLOOD BY AUTOMATED COUNT: 15 % (ref 11.5–14.5)
GLUCOSE UR STRIP-MCNC: NEGATIVE MG/DL
HBA1C MFR BLD: 8.3 % (ref 4.8–5.9)
HCT VFR BLD AUTO: 42.5 % (ref 37–47)
HGB BLD-MCNC: 14.5 G/DL (ref 12–16)
HGB UR QL STRIP: NEGATIVE
KETONES UR STRIP-MCNC: ABNORMAL MG/DL
LEUKOCYTE ESTERASE UR QL STRIP: NEGATIVE
LYMPHOCYTES # BLD: 1.5 K/UL (ref 1–4.8)
LYMPHOCYTES NFR BLD: 18.9 %
MCH RBC QN AUTO: 29.9 PG (ref 27–31.3)
MCHC RBC AUTO-ENTMCNC: 34.1 % (ref 33–37)
MCV RBC AUTO: 87.7 FL (ref 79.4–94.8)
MONOCYTES # BLD: 0.5 K/UL (ref 0.2–0.8)
MONOCYTES NFR BLD: 6.6 %
NEUTROPHILS # BLD: 5.7 K/UL (ref 1.4–6.5)
NEUTS SEG NFR BLD: 72.8 %
NITRITE UR QL STRIP: NEGATIVE
PH UR STRIP: 5.5 [PH] (ref 5–9)
PLATELET # BLD AUTO: 301 K/UL (ref 130–400)
PROT UR STRIP-MCNC: NEGATIVE MG/DL
RBC # BLD AUTO: 4.84 M/UL (ref 4.2–5.4)
SP GR UR STRIP: 1.02 (ref 1–1.03)
URINE REFLEX TO CULTURE: ABNORMAL
UROBILINOGEN UR STRIP-ACNC: 1 E.U./DL
WBC # BLD AUTO: 7.9 K/UL (ref 4.8–10.8)

## 2023-06-29 PROCEDURE — 81003 URINALYSIS AUTO W/O SCOPE: CPT

## 2023-06-29 PROCEDURE — 85730 THROMBOPLASTIN TIME PARTIAL: CPT

## 2023-06-29 PROCEDURE — 85025 COMPLETE CBC W/AUTO DIFF WBC: CPT

## 2023-06-29 PROCEDURE — 83036 HEMOGLOBIN GLYCOSYLATED A1C: CPT

## 2023-06-29 RX ORDER — CEFAZOLIN SODIUM IN 0.9 % NACL 2 G/100 ML
2000 PLASTIC BAG, INJECTION (ML) INTRAVENOUS
OUTPATIENT
Start: 2023-07-18 | End: 2023-07-18

## 2023-07-02 SDOH — HEALTH STABILITY: PHYSICAL HEALTH: ON AVERAGE, HOW MANY DAYS PER WEEK DO YOU ENGAGE IN MODERATE TO STRENUOUS EXERCISE (LIKE A BRISK WALK)?: 4 DAYS

## 2023-07-02 SDOH — HEALTH STABILITY: PHYSICAL HEALTH: ON AVERAGE, HOW MANY MINUTES DO YOU ENGAGE IN EXERCISE AT THIS LEVEL?: 30 MIN

## 2023-07-02 ASSESSMENT — PATIENT HEALTH QUESTIONNAIRE - PHQ9
1. LITTLE INTEREST OR PLEASURE IN DOING THINGS: 0
SUM OF ALL RESPONSES TO PHQ QUESTIONS 1-9: 0
SUM OF ALL RESPONSES TO PHQ QUESTIONS 1-9: 0
2. FEELING DOWN, DEPRESSED OR HOPELESS: 0
SUM OF ALL RESPONSES TO PHQ9 QUESTIONS 1 & 2: 0
SUM OF ALL RESPONSES TO PHQ QUESTIONS 1-9: 0
SUM OF ALL RESPONSES TO PHQ QUESTIONS 1-9: 0

## 2023-07-02 ASSESSMENT — LIFESTYLE VARIABLES
HOW MANY STANDARD DRINKS CONTAINING ALCOHOL DO YOU HAVE ON A TYPICAL DAY: 1 OR 2
HOW OFTEN DO YOU HAVE A DRINK CONTAINING ALCOHOL: 2
HOW OFTEN DO YOU HAVE A DRINK CONTAINING ALCOHOL: MONTHLY OR LESS
HOW OFTEN DO YOU HAVE SIX OR MORE DRINKS ON ONE OCCASION: 1
HOW MANY STANDARD DRINKS CONTAINING ALCOHOL DO YOU HAVE ON A TYPICAL DAY: 1

## 2023-07-05 ENCOUNTER — OFFICE VISIT (OUTPATIENT)
Dept: FAMILY MEDICINE CLINIC | Age: 68
End: 2023-07-05
Payer: MEDICARE

## 2023-07-05 VITALS
WEIGHT: 200 LBS | DIASTOLIC BLOOD PRESSURE: 66 MMHG | HEART RATE: 85 BPM | SYSTOLIC BLOOD PRESSURE: 122 MMHG | OXYGEN SATURATION: 96 % | HEIGHT: 60 IN | BODY MASS INDEX: 39.27 KG/M2 | TEMPERATURE: 97.5 F

## 2023-07-05 VITALS
WEIGHT: 200 LBS | HEART RATE: 85 BPM | TEMPERATURE: 97.5 F | SYSTOLIC BLOOD PRESSURE: 122 MMHG | OXYGEN SATURATION: 96 % | DIASTOLIC BLOOD PRESSURE: 66 MMHG | BODY MASS INDEX: 39.27 KG/M2 | HEIGHT: 60 IN

## 2023-07-05 DIAGNOSIS — Z01.818 PRE-OP EXAM: Primary | ICD-10-CM

## 2023-07-05 DIAGNOSIS — Z00.00 MEDICARE ANNUAL WELLNESS VISIT, SUBSEQUENT: Primary | ICD-10-CM

## 2023-07-05 PROCEDURE — G0439 PPPS, SUBSEQ VISIT: HCPCS

## 2023-07-05 PROCEDURE — 1090F PRES/ABSN URINE INCON ASSESS: CPT

## 2023-07-05 PROCEDURE — G8417 CALC BMI ABV UP PARAM F/U: HCPCS

## 2023-07-05 PROCEDURE — 3074F SYST BP LT 130 MM HG: CPT

## 2023-07-05 PROCEDURE — 99213 OFFICE O/P EST LOW 20 MIN: CPT

## 2023-07-05 PROCEDURE — 3017F COLORECTAL CA SCREEN DOC REV: CPT

## 2023-07-05 PROCEDURE — G8427 DOCREV CUR MEDS BY ELIG CLIN: HCPCS

## 2023-07-05 PROCEDURE — 3078F DIAST BP <80 MM HG: CPT

## 2023-07-05 PROCEDURE — 1123F ACP DISCUSS/DSCN MKR DOCD: CPT

## 2023-07-05 ASSESSMENT — ENCOUNTER SYMPTOMS
NAUSEA: 0
CHEST TIGHTNESS: 0
COLOR CHANGE: 0
DIARRHEA: 0
COUGH: 0
SHORTNESS OF BREATH: 0

## 2023-07-05 NOTE — PATIENT INSTRUCTIONS
lifestyle, illnesses that may run in your family, and various assessments and screenings as appropriate. After reviewing your medical record and screening and assessments performed today your provider may have ordered immunizations, labs, imaging, and/or referrals for you. A list of these orders (if applicable) as well as your Preventive Care list are included within your After Visit Summary for your review. Other Preventive Recommendations:    A preventive eye exam performed by an eye specialist is recommended every 1-2 years to screen for glaucoma; cataracts, macular degeneration, and other eye disorders. A preventive dental visit is recommended every 6 months. Try to get at least 150 minutes of exercise per week or 10,000 steps per day on a pedometer . Order or download the FREE \"Exercise & Physical Activity: Your Everyday Guide\" from [employer]. Call 7-251.456.2610 or search [employer] online. You need 0796-3603 mg of calcium and 4261-4635 IU of vitamin D per day. It is possible to meet your calcium requirement with diet alone, but a vitamin D supplement is usually necessary to meet this goal.  When exposed to the sun, use a sunscreen that protects against both UVA and UVB radiation with an SPF of 30 or greater. Reapply every 2 to 3 hours or after sweating, drying off with a towel, or swimming. Always wear a seat belt when traveling in a car. Always wear a helmet when riding a bicycle or motorcycle.

## 2023-07-05 NOTE — PATIENT INSTRUCTIONS
Pt has instructions from Dr Ranjith Quezada to take metoprolol with sip of water day of surgery otherwise nothing by mouth

## 2023-07-05 NOTE — PROGRESS NOTES
Pre Operative  Encounter  CHIEF COMPLAINT     Maggy Morel is a 76 y.o. female who presents with:  Chief Complaint   Patient presents with    Pre-op Exam     Pt presents today for pre-op exam. Pt is having R shoulder rotator cuff repair with Dr. Benny Alexander on 7/18/23       HISTORY OF [place] presents to the office today for a preoperative consultation at the request of surgeon, Dr. Barb Cox, who plans on performing Right shoulder revision on July 18 at [facility]. The current problem began 3 years ago, and symptoms have been worsening with time. Conservative therapy:  Takes ibuprofen regularly. .     DC aspirin for 10 days     Planned anesthesia: General  Known anesthesia problems: No Issues  Bleeding risk: Aspirin, advised to  DC aspirin for 10 days before surgery  Personal or FH of DVT/PE: No    Patient objection to receiving blood Products :  No Objections       REVIEW OF SYSTEMS     Review of Systems   Constitutional:  Negative for fatigue and unexpected weight change. HENT: Negative. Eyes:  Negative for visual disturbance. Respiratory:  Negative for cough, chest tightness and shortness of breath. Cardiovascular:  Negative for chest pain, palpitations and leg swelling. Gastrointestinal:  Negative for diarrhea and nausea. Endocrine: Negative for polyuria. Genitourinary: Negative. Negative for dysuria. Musculoskeletal: Negative. Skin:  Negative for color change. Neurological:  Negative for weakness, light-headedness, numbness and headaches. Hematological:  Does not bruise/bleed easily. Psychiatric/Behavioral:  Positive for sleep disturbance (Waking up during the night possibly due to shoulder pain). Negative for confusion. The patient is not nervous/anxious.     PAST MEDICAL HISTORY         Diagnosis Date    Asthma     CAD (coronary artery disease)     Hypertension     Kidney stone     Sleep apnea     wears cpap    Type 2 diabetes mellitus without

## 2023-07-10 ENCOUNTER — HOSPITAL ENCOUNTER (OUTPATIENT)
Dept: LAB | Age: 68
Discharge: HOME | End: 2023-07-10
Payer: MEDICARE

## 2023-07-10 DIAGNOSIS — Z01.818 PRE-OP EXAM: ICD-10-CM

## 2023-07-10 LAB
ANION GAP SERPL CALCULATED.3IONS-SCNC: 14 MEQ/L (ref 9–15)
BUN SERPL-MCNC: 14 MG/DL (ref 8–23)
CALCIUM SERPL-MCNC: 9.5 MG/DL (ref 8.5–9.9)
CHLORIDE SERPL-SCNC: 100 MEQ/L (ref 95–107)
CO2 SERPL-SCNC: 27 MEQ/L (ref 20–31)
CREAT SERPL-MCNC: 0.67 MG/DL (ref 0.5–0.9)
GLUCOSE SERPL-MCNC: 142 MG/DL (ref 70–99)
POTASSIUM SERPL-SCNC: 3.4 MEQ/L (ref 3.4–4.9)
SODIUM SERPL-SCNC: 141 MEQ/L (ref 135–144)

## 2023-07-10 PROCEDURE — 80048 BASIC METABOLIC PNL TOTAL CA: CPT

## 2023-07-10 PROCEDURE — 36415 COLL VENOUS BLD VENIPUNCTURE: CPT

## 2023-07-18 ENCOUNTER — ANESTHESIA EVENT (OUTPATIENT)
Dept: OPERATING ROOM | Age: 68
End: 2023-07-18
Payer: MEDICARE

## 2023-07-18 ENCOUNTER — APPOINTMENT (OUTPATIENT)
Dept: ULTRASOUND IMAGING | Age: 68
End: 2023-07-18
Attending: ORTHOPAEDIC SURGERY
Payer: MEDICARE

## 2023-07-18 ENCOUNTER — HOSPITAL ENCOUNTER (OUTPATIENT)
Age: 68
Discharge: HOME | End: 2023-07-19
Attending: ORTHOPAEDIC SURGERY | Admitting: ORTHOPAEDIC SURGERY
Payer: MEDICARE

## 2023-07-18 ENCOUNTER — ANESTHESIA (OUTPATIENT)
Dept: OPERATING ROOM | Age: 68
End: 2023-07-18
Payer: MEDICARE

## 2023-07-18 DIAGNOSIS — Z98.890 S/P ROTATOR CUFF REPAIR: Primary | ICD-10-CM

## 2023-07-18 LAB
GLUCOSE BLD-MCNC: 180 MG/DL (ref 70–99)
PERFORMED ON: ABNORMAL

## 2023-07-18 PROCEDURE — 2580000003 HC RX 258: Performed by: STUDENT IN AN ORGANIZED HEALTH CARE EDUCATION/TRAINING PROGRAM

## 2023-07-18 PROCEDURE — 94150 VITAL CAPACITY TEST: CPT

## 2023-07-18 PROCEDURE — 2500000003 HC RX 250 WO HCPCS: Performed by: STUDENT IN AN ORGANIZED HEALTH CARE EDUCATION/TRAINING PROGRAM

## 2023-07-18 PROCEDURE — A4217 STERILE WATER/SALINE, 500 ML: HCPCS | Performed by: ORTHOPAEDIC SURGERY

## 2023-07-18 PROCEDURE — 3700000000 HC ANESTHESIA ATTENDED CARE: Performed by: ORTHOPAEDIC SURGERY

## 2023-07-18 PROCEDURE — 7100000000 HC PACU RECOVERY - FIRST 15 MIN: Performed by: ORTHOPAEDIC SURGERY

## 2023-07-18 PROCEDURE — 97162 PT EVAL MOD COMPLEX 30 MIN: CPT

## 2023-07-18 PROCEDURE — 97166 OT EVAL MOD COMPLEX 45 MIN: CPT

## 2023-07-18 PROCEDURE — 6370000000 HC RX 637 (ALT 250 FOR IP): Performed by: INTERNAL MEDICINE

## 2023-07-18 PROCEDURE — 64415 NJX AA&/STRD BRCH PLXS IMG: CPT | Performed by: STUDENT IN AN ORGANIZED HEALTH CARE EDUCATION/TRAINING PROGRAM

## 2023-07-18 PROCEDURE — G0378 HOSPITAL OBSERVATION PER HR: HCPCS

## 2023-07-18 PROCEDURE — 7100000001 HC PACU RECOVERY - ADDTL 15 MIN: Performed by: ORTHOPAEDIC SURGERY

## 2023-07-18 PROCEDURE — 3700000001 HC ADD 15 MINUTES (ANESTHESIA): Performed by: ORTHOPAEDIC SURGERY

## 2023-07-18 PROCEDURE — 3600000004 HC SURGERY LEVEL 4 BASE: Performed by: ORTHOPAEDIC SURGERY

## 2023-07-18 PROCEDURE — 6360000002 HC RX W HCPCS: Performed by: ORTHOPAEDIC SURGERY

## 2023-07-18 PROCEDURE — 97116 GAIT TRAINING THERAPY: CPT

## 2023-07-18 PROCEDURE — 6370000000 HC RX 637 (ALT 250 FOR IP): Performed by: ORTHOPAEDIC SURGERY

## 2023-07-18 PROCEDURE — 2700000000 HC OXYGEN THERAPY PER DAY

## 2023-07-18 PROCEDURE — 2580000003 HC RX 258: Performed by: ORTHOPAEDIC SURGERY

## 2023-07-18 PROCEDURE — 2709999900 HC NON-CHARGEABLE SUPPLY: Performed by: ORTHOPAEDIC SURGERY

## 2023-07-18 PROCEDURE — 97530 THERAPEUTIC ACTIVITIES: CPT

## 2023-07-18 PROCEDURE — C1713 ANCHOR/SCREW BN/BN,TIS/BN: HCPCS | Performed by: ORTHOPAEDIC SURGERY

## 2023-07-18 PROCEDURE — 76942 ECHO GUIDE FOR BIOPSY: CPT

## 2023-07-18 PROCEDURE — 6360000002 HC RX W HCPCS: Performed by: STUDENT IN AN ORGANIZED HEALTH CARE EDUCATION/TRAINING PROGRAM

## 2023-07-18 PROCEDURE — 3600000014 HC SURGERY LEVEL 4 ADDTL 15MIN: Performed by: ORTHOPAEDIC SURGERY

## 2023-07-18 PROCEDURE — C9399 UNCLASSIFIED DRUGS OR BIOLOG: HCPCS | Performed by: STUDENT IN AN ORGANIZED HEALTH CARE EDUCATION/TRAINING PROGRAM

## 2023-07-18 DEVICE — BIO-COMP SWVLK C, CLD 4.75X19.1MM
Type: IMPLANTABLE DEVICE | Site: SHOULDER | Status: FUNCTIONAL
Brand: ARTHREX®

## 2023-07-18 DEVICE — SPEEDBRG IMP SYS W/BIO-COMP SWVLK
Type: IMPLANTABLE DEVICE | Site: SHOULDER | Status: FUNCTIONAL
Brand: ARTHREX®

## 2023-07-18 RX ORDER — GLIPIZIDE 5 MG/1
5 TABLET ORAL
Status: DISCONTINUED | OUTPATIENT
Start: 2023-07-18 | End: 2023-07-19 | Stop reason: HOSPADM

## 2023-07-18 RX ORDER — LABETALOL HYDROCHLORIDE 5 MG/ML
10 INJECTION, SOLUTION INTRAVENOUS
Status: DISCONTINUED | OUTPATIENT
Start: 2023-07-18 | End: 2023-07-18 | Stop reason: HOSPADM

## 2023-07-18 RX ORDER — DIPHENHYDRAMINE HYDROCHLORIDE 50 MG/ML
12.5 INJECTION INTRAMUSCULAR; INTRAVENOUS
Status: DISCONTINUED | OUTPATIENT
Start: 2023-07-18 | End: 2023-07-18 | Stop reason: HOSPADM

## 2023-07-18 RX ORDER — METOPROLOL SUCCINATE 50 MG/1
150 TABLET, EXTENDED RELEASE ORAL DAILY
Status: DISCONTINUED | OUTPATIENT
Start: 2023-07-19 | End: 2023-07-19 | Stop reason: HOSPADM

## 2023-07-18 RX ORDER — ATORVASTATIN CALCIUM 10 MG/1
20 TABLET, FILM COATED ORAL NIGHTLY
Status: DISCONTINUED | OUTPATIENT
Start: 2023-07-18 | End: 2023-07-19 | Stop reason: HOSPADM

## 2023-07-18 RX ORDER — MAGNESIUM HYDROXIDE 1200 MG/15ML
LIQUID ORAL CONTINUOUS PRN
Status: DISCONTINUED | OUTPATIENT
Start: 2023-07-18 | End: 2023-07-18 | Stop reason: HOSPADM

## 2023-07-18 RX ORDER — OXYCODONE HYDROCHLORIDE 5 MG/1
10 TABLET ORAL PRN
Status: DISCONTINUED | OUTPATIENT
Start: 2023-07-18 | End: 2023-07-18 | Stop reason: HOSPADM

## 2023-07-18 RX ORDER — PROCHLORPERAZINE EDISYLATE 5 MG/ML
5 INJECTION INTRAMUSCULAR; INTRAVENOUS
Status: DISCONTINUED | OUTPATIENT
Start: 2023-07-18 | End: 2023-07-18 | Stop reason: HOSPADM

## 2023-07-18 RX ORDER — MIDAZOLAM HYDROCHLORIDE 1 MG/ML
INJECTION INTRAMUSCULAR; INTRAVENOUS PRN
Status: DISCONTINUED | OUTPATIENT
Start: 2023-07-18 | End: 2023-07-18 | Stop reason: SDUPTHER

## 2023-07-18 RX ORDER — MEPERIDINE HYDROCHLORIDE 25 MG/ML
12.5 INJECTION INTRAMUSCULAR; INTRAVENOUS; SUBCUTANEOUS EVERY 5 MIN PRN
Status: DISCONTINUED | OUTPATIENT
Start: 2023-07-18 | End: 2023-07-18 | Stop reason: HOSPADM

## 2023-07-18 RX ORDER — HYDRALAZINE HYDROCHLORIDE 20 MG/ML
10 INJECTION INTRAMUSCULAR; INTRAVENOUS
Status: DISCONTINUED | OUTPATIENT
Start: 2023-07-18 | End: 2023-07-18 | Stop reason: HOSPADM

## 2023-07-18 RX ORDER — ASPIRIN 81 MG/1
81 TABLET, CHEWABLE ORAL DAILY
Status: DISCONTINUED | OUTPATIENT
Start: 2023-07-18 | End: 2023-07-19 | Stop reason: HOSPADM

## 2023-07-18 RX ORDER — LIDOCAINE HYDROCHLORIDE 10 MG/ML
INJECTION, SOLUTION INFILTRATION; PERINEURAL PRN
Status: DISCONTINUED | OUTPATIENT
Start: 2023-07-18 | End: 2023-07-18 | Stop reason: SDUPTHER

## 2023-07-18 RX ORDER — CHLORTHALIDONE 25 MG/1
25 TABLET ORAL DAILY
Status: DISCONTINUED | OUTPATIENT
Start: 2023-07-18 | End: 2023-07-19 | Stop reason: HOSPADM

## 2023-07-18 RX ORDER — SODIUM CHLORIDE 0.9 % (FLUSH) 0.9 %
5-40 SYRINGE (ML) INJECTION PRN
Status: DISCONTINUED | OUTPATIENT
Start: 2023-07-18 | End: 2023-07-19 | Stop reason: HOSPADM

## 2023-07-18 RX ORDER — MORPHINE SULFATE 4 MG/ML
4 INJECTION, SOLUTION INTRAMUSCULAR; INTRAVENOUS
Status: DISCONTINUED | OUTPATIENT
Start: 2023-07-18 | End: 2023-07-19 | Stop reason: HOSPADM

## 2023-07-18 RX ORDER — ROPIVACAINE HYDROCHLORIDE 5 MG/ML
INJECTION, SOLUTION EPIDURAL; INFILTRATION; PERINEURAL PRN
Status: DISCONTINUED | OUTPATIENT
Start: 2023-07-18 | End: 2023-07-18 | Stop reason: SDUPTHER

## 2023-07-18 RX ORDER — MORPHINE SULFATE 2 MG/ML
2 INJECTION, SOLUTION INTRAMUSCULAR; INTRAVENOUS
Status: DISCONTINUED | OUTPATIENT
Start: 2023-07-18 | End: 2023-07-19 | Stop reason: HOSPADM

## 2023-07-18 RX ORDER — FENTANYL CITRATE 50 UG/ML
INJECTION, SOLUTION INTRAMUSCULAR; INTRAVENOUS PRN
Status: DISCONTINUED | OUTPATIENT
Start: 2023-07-18 | End: 2023-07-18 | Stop reason: SDUPTHER

## 2023-07-18 RX ORDER — SODIUM CHLORIDE 9 MG/ML
INJECTION, SOLUTION INTRAVENOUS CONTINUOUS
Status: DISCONTINUED | OUTPATIENT
Start: 2023-07-18 | End: 2023-07-19

## 2023-07-18 RX ORDER — OXYCODONE HYDROCHLORIDE 5 MG/1
5 TABLET ORAL PRN
Status: DISCONTINUED | OUTPATIENT
Start: 2023-07-18 | End: 2023-07-18 | Stop reason: HOSPADM

## 2023-07-18 RX ORDER — FENTANYL CITRATE 0.05 MG/ML
25 INJECTION, SOLUTION INTRAMUSCULAR; INTRAVENOUS EVERY 5 MIN PRN
Status: DISCONTINUED | OUTPATIENT
Start: 2023-07-18 | End: 2023-07-18 | Stop reason: HOSPADM

## 2023-07-18 RX ORDER — ONDANSETRON 2 MG/ML
4 INJECTION INTRAMUSCULAR; INTRAVENOUS
Status: DISCONTINUED | OUTPATIENT
Start: 2023-07-18 | End: 2023-07-18 | Stop reason: HOSPADM

## 2023-07-18 RX ORDER — SODIUM CHLORIDE, SODIUM LACTATE, POTASSIUM CHLORIDE, CALCIUM CHLORIDE 600; 310; 30; 20 MG/100ML; MG/100ML; MG/100ML; MG/100ML
INJECTION, SOLUTION INTRAVENOUS CONTINUOUS
Status: DISCONTINUED | OUTPATIENT
Start: 2023-07-18 | End: 2023-07-19

## 2023-07-18 RX ORDER — MONTELUKAST SODIUM 10 MG/1
10 TABLET ORAL NIGHTLY
Status: DISCONTINUED | OUTPATIENT
Start: 2023-07-18 | End: 2023-07-19 | Stop reason: HOSPADM

## 2023-07-18 RX ORDER — SODIUM CHLORIDE 0.9 % (FLUSH) 0.9 %
5-40 SYRINGE (ML) INJECTION EVERY 12 HOURS SCHEDULED
Status: DISCONTINUED | OUTPATIENT
Start: 2023-07-18 | End: 2023-07-18 | Stop reason: HOSPADM

## 2023-07-18 RX ORDER — FENTANYL CITRATE 0.05 MG/ML
50 INJECTION, SOLUTION INTRAMUSCULAR; INTRAVENOUS EVERY 5 MIN PRN
Status: DISCONTINUED | OUTPATIENT
Start: 2023-07-18 | End: 2023-07-18 | Stop reason: HOSPADM

## 2023-07-18 RX ORDER — ONDANSETRON 4 MG/1
4 TABLET, ORALLY DISINTEGRATING ORAL EVERY 8 HOURS PRN
Status: DISCONTINUED | OUTPATIENT
Start: 2023-07-18 | End: 2023-07-19 | Stop reason: HOSPADM

## 2023-07-18 RX ORDER — MAGNESIUM HYDROXIDE/ALUMINUM HYDROXICE/SIMETHICONE 120; 1200; 1200 MG/30ML; MG/30ML; MG/30ML
15 SUSPENSION ORAL EVERY 6 HOURS PRN
Status: DISCONTINUED | OUTPATIENT
Start: 2023-07-18 | End: 2023-07-19 | Stop reason: HOSPADM

## 2023-07-18 RX ORDER — LIDOCAINE HYDROCHLORIDE 10 MG/ML
INJECTION, SOLUTION EPIDURAL; INFILTRATION; INTRACAUDAL; PERINEURAL PRN
Status: DISCONTINUED | OUTPATIENT
Start: 2023-07-18 | End: 2023-07-18 | Stop reason: SDUPTHER

## 2023-07-18 RX ORDER — LOSARTAN POTASSIUM 25 MG/1
25 TABLET ORAL DAILY
Status: DISCONTINUED | OUTPATIENT
Start: 2023-07-18 | End: 2023-07-19 | Stop reason: HOSPADM

## 2023-07-18 RX ORDER — ONDANSETRON 2 MG/ML
INJECTION INTRAMUSCULAR; INTRAVENOUS PRN
Status: DISCONTINUED | OUTPATIENT
Start: 2023-07-18 | End: 2023-07-18 | Stop reason: SDUPTHER

## 2023-07-18 RX ORDER — ACETAMINOPHEN 325 MG/1
650 TABLET ORAL EVERY 4 HOURS PRN
Status: DISCONTINUED | OUTPATIENT
Start: 2023-07-18 | End: 2023-07-19 | Stop reason: HOSPADM

## 2023-07-18 RX ORDER — SODIUM CHLORIDE 0.9 % (FLUSH) 0.9 %
5-40 SYRINGE (ML) INJECTION EVERY 12 HOURS SCHEDULED
Status: DISCONTINUED | OUTPATIENT
Start: 2023-07-18 | End: 2023-07-19 | Stop reason: HOSPADM

## 2023-07-18 RX ORDER — OXYCODONE HYDROCHLORIDE 5 MG/1
5 TABLET ORAL EVERY 4 HOURS PRN
Status: DISCONTINUED | OUTPATIENT
Start: 2023-07-18 | End: 2023-07-19 | Stop reason: HOSPADM

## 2023-07-18 RX ORDER — SODIUM CHLORIDE, SODIUM LACTATE, POTASSIUM CHLORIDE, CALCIUM CHLORIDE 600; 310; 30; 20 MG/100ML; MG/100ML; MG/100ML; MG/100ML
INJECTION, SOLUTION INTRAVENOUS CONTINUOUS
Status: DISCONTINUED | OUTPATIENT
Start: 2023-07-18 | End: 2023-07-18 | Stop reason: HOSPADM

## 2023-07-18 RX ORDER — CEFAZOLIN SODIUM 1 G/3ML
INJECTION, POWDER, FOR SOLUTION INTRAMUSCULAR; INTRAVENOUS
Status: DISPENSED
Start: 2023-07-18 | End: 2023-07-18

## 2023-07-18 RX ORDER — SODIUM CHLORIDE 9 MG/ML
INJECTION, SOLUTION INTRAVENOUS PRN
Status: DISCONTINUED | OUTPATIENT
Start: 2023-07-18 | End: 2023-07-18 | Stop reason: HOSPADM

## 2023-07-18 RX ORDER — ALBUTEROL SULFATE 90 UG/1
2 AEROSOL, METERED RESPIRATORY (INHALATION) EVERY 4 HOURS PRN
Status: DISCONTINUED | OUTPATIENT
Start: 2023-07-18 | End: 2023-07-19 | Stop reason: HOSPADM

## 2023-07-18 RX ORDER — ONDANSETRON 2 MG/ML
4 INJECTION INTRAMUSCULAR; INTRAVENOUS EVERY 6 HOURS PRN
Status: DISCONTINUED | OUTPATIENT
Start: 2023-07-18 | End: 2023-07-19 | Stop reason: HOSPADM

## 2023-07-18 RX ORDER — SODIUM CHLORIDE 9 MG/ML
INJECTION, SOLUTION INTRAVENOUS PRN
Status: DISCONTINUED | OUTPATIENT
Start: 2023-07-18 | End: 2023-07-19 | Stop reason: HOSPADM

## 2023-07-18 RX ORDER — SODIUM CHLORIDE 0.9 % (FLUSH) 0.9 %
5-40 SYRINGE (ML) INJECTION PRN
Status: DISCONTINUED | OUTPATIENT
Start: 2023-07-18 | End: 2023-07-18 | Stop reason: HOSPADM

## 2023-07-18 RX ORDER — DILTIAZEM HYDROCHLORIDE 120 MG/1
120 CAPSULE, COATED, EXTENDED RELEASE ORAL DAILY
Status: DISCONTINUED | OUTPATIENT
Start: 2023-07-18 | End: 2023-07-19 | Stop reason: HOSPADM

## 2023-07-18 RX ORDER — PROPOFOL 10 MG/ML
INJECTION, EMULSION INTRAVENOUS PRN
Status: DISCONTINUED | OUTPATIENT
Start: 2023-07-18 | End: 2023-07-18 | Stop reason: SDUPTHER

## 2023-07-18 RX ADMIN — LIDOCAINE HYDROCHLORIDE 20 MG: 10 INJECTION, SOLUTION INFILTRATION; PERINEURAL at 09:02

## 2023-07-18 RX ADMIN — ROPIVACAINE HYDROCHLORIDE 30 ML: 5 INJECTION, SOLUTION EPIDURAL; INFILTRATION; PERINEURAL at 08:43

## 2023-07-18 RX ADMIN — METFORMIN HYDROCHLORIDE 1000 MG: 500 TABLET ORAL at 16:45

## 2023-07-18 RX ADMIN — MIDAZOLAM HYDROCHLORIDE 2 MG: 2 INJECTION, SOLUTION INTRAMUSCULAR; INTRAVENOUS at 08:37

## 2023-07-18 RX ADMIN — PROPOFOL 160 MG: 10 INJECTION, EMULSION INTRAVENOUS at 09:02

## 2023-07-18 RX ADMIN — SODIUM CHLORIDE, POTASSIUM CHLORIDE, SODIUM LACTATE AND CALCIUM CHLORIDE: 600; 310; 30; 20 INJECTION, SOLUTION INTRAVENOUS at 13:01

## 2023-07-18 RX ADMIN — MONTELUKAST 10 MG: 10 TABLET, FILM COATED ORAL at 21:42

## 2023-07-18 RX ADMIN — DILTIAZEM HYDROCHLORIDE 120 MG: 120 CAPSULE, EXTENDED RELEASE ORAL at 13:00

## 2023-07-18 RX ADMIN — CEFAZOLIN 2000 MG: 1 INJECTION, POWDER, FOR SOLUTION INTRAMUSCULAR; INTRAVENOUS at 09:08

## 2023-07-18 RX ADMIN — LIDOCAINE HYDROCHLORIDE 30 MG: 10 INJECTION, SOLUTION EPIDURAL; INFILTRATION; INTRACAUDAL; PERINEURAL at 08:40

## 2023-07-18 RX ADMIN — SODIUM CHLORIDE, POTASSIUM CHLORIDE, SODIUM LACTATE AND CALCIUM CHLORIDE: 600; 310; 30; 20 INJECTION, SOLUTION INTRAVENOUS at 08:04

## 2023-07-18 RX ADMIN — LOSARTAN POTASSIUM 25 MG: 25 TABLET, FILM COATED ORAL at 13:00

## 2023-07-18 RX ADMIN — CEFAZOLIN 2000 MG: 1 INJECTION, POWDER, FOR SOLUTION INTRAMUSCULAR; INTRAVENOUS at 16:44

## 2023-07-18 RX ADMIN — ATORVASTATIN CALCIUM 20 MG: 10 TABLET, FILM COATED ORAL at 21:43

## 2023-07-18 RX ADMIN — SODIUM CHLORIDE, POTASSIUM CHLORIDE, SODIUM LACTATE AND CALCIUM CHLORIDE: 600; 310; 30; 20 INJECTION, SOLUTION INTRAVENOUS at 10:15

## 2023-07-18 RX ADMIN — CHLORTHALIDONE 25 MG: 25 TABLET ORAL at 13:00

## 2023-07-18 RX ADMIN — SUGAMMADEX 200 MG: 100 INJECTION, SOLUTION INTRAVENOUS at 10:19

## 2023-07-18 RX ADMIN — Medication 10 ML: at 22:02

## 2023-07-18 RX ADMIN — GLIPIZIDE 5 MG: 5 TABLET ORAL at 16:45

## 2023-07-18 RX ADMIN — ASPIRIN 81 MG CHEWABLE TABLET 81 MG: 81 TABLET CHEWABLE at 13:00

## 2023-07-18 RX ADMIN — FENTANYL CITRATE 100 MCG: 50 INJECTION INTRAMUSCULAR; INTRAVENOUS at 09:02

## 2023-07-18 RX ADMIN — ONDANSETRON 4 MG: 2 INJECTION INTRAMUSCULAR; INTRAVENOUS at 09:27

## 2023-07-18 ASSESSMENT — PAIN SCALES - GENERAL
PAINLEVEL_OUTOF10: 0

## 2023-07-18 ASSESSMENT — PAIN - FUNCTIONAL ASSESSMENT: PAIN_FUNCTIONAL_ASSESSMENT: 0-10

## 2023-07-18 NOTE — ANESTHESIA PRE PROCEDURE
Department of Anesthesiology  Preprocedure Note       Name:  Rachid Graves   Age:  76 y.o.  :  1955                                          MRN:  [id number]         Date:  2023      Surgeon: Breana Ely):  Юлия Harper MD    Procedure: Procedure(s):  RIGHT SHOULDER ARTHROSCOPY ROTATOR CUFF REPAIR REVISION LATERAL, [facility]    Medications prior to admission:   Prior to Admission medications    Medication Sig Start Date End Date Taking?  Authorizing Provider   Semaglutide,0.25 or 0.5MG/DOS, 2 MG/1.5ML SOPN Inject 0.25 mg into the skin every 7 days 23   PRASHANT Bazan CNP   Potassium Gluconate 595 (99 K) MG TABS Take by mouth    Historical Provider, MD   glimepiride (AMARYL) 2 MG tablet TAKE 1 TABLET BY MOUTH  EVERY MORNING BEFORE  BREAKFAST AND EVERY EVENING BEFORE DINNER. 23   PRASHANT Bazan CNP   losartan (COZAAR) 25 MG tablet Take 1 tablet by mouth daily 23   PRASHANT Bazan CNP   metFORMIN (GLUCOPHAGE) 1000 MG tablet TAKE 1 TABLET TWICE A DAY WITH MEALS (NEED APPOINTMENT FOR FURTHER REFILLS) 23   PRASHANT Bazan CNP   montelukast (SINGULAIR) 10 MG tablet Take 1 tablet by mouth nightly 23   PRASHANT Bazan CNP   VENTOLIN  (90 Base) MCG/ACT inhaler INHALE 2 PUFFS EVERY 4 HOURS AS NEEDED 23   PRASHANT Bazan CNP   chlorthalidone (HYGROTON) 25 MG tablet Take 1 tablet by mouth daily 22   Yamilka Reis MD   metoprolol succinate (TOPROL XL) 100 MG extended release tablet Take 1.5 tablets by mouth daily  Patient taking differently: Take 1 tablet by mouth in the morning and at bedtime 1 tab AM  1/2 tab pm 22   Yamilka Reis MD   dilTIAZem (CARDIZEM CD) 120 MG extended release capsule Take 1 capsule by mouth daily 22   Yamilka Reis MD   atorvastatin (LIPITOR) 20 MG tablet Take 1 tablet by mouth nightly 22   Yamilka Reis MD   blood glucose monitor strips Test 2 times a day & as needed

## 2023-07-18 NOTE — ANESTHESIA POSTPROCEDURE EVALUATION
Department of Anesthesiology  Postprocedure Note    Patient: Karthikeyan Rolon  MRN: [id number]  YOB: 1955  Date of evaluation: 7/18/2023      Procedure Summary     Date: 07/18/23 Room / Location: [address] [place] [address]    Anesthesia Start: 9525 Anesthesia Stop: 1031    Procedure: RIGHT SHOULDER ARTHROSCOPY ROTATOR CUFF REPAIR REVISION LATERAL, [address] (Right: Shoulder) Diagnosis:       Tear of right rotator cuff, unspecified tear extent, unspecified whether traumatic      (Tear of right rotator cuff, unspecified tear extent, unspecified whether traumatic [M75.101])    Surgeons: Clive Lees MD Responsible Provider: Allegra Hernandez MD    Anesthesia Type: general ASA Status: 3          Anesthesia Type: No value filed.     Harley Phase I:      Harley Phase II:        Anesthesia Post Evaluation    Patient location during evaluation: bedside  Patient participation: complete - patient participated  Level of consciousness: awake and sleepy but conscious  Pain score: 0  Airway patency: patent  Nausea & Vomiting: no nausea and no vomiting  Complications: no  Cardiovascular status: blood pressure returned to baseline and hemodynamically stable  Respiratory status: acceptable  Hydration status: euvolemic

## 2023-07-18 NOTE — ANESTHESIA PROCEDURE NOTES
Peripheral Block    Patient location during procedure: pre-op  Reason for block: post-op pain management and at surgeon's request  Start time: 7/18/2023 8:39 AM  End time: 7/18/2023 8:44 AM  Staffing  Performed: anesthesiologist   Anesthesiologist: Toño Fierro MD  Preanesthetic Checklist  Completed: patient identified, IV checked, site marked, risks and benefits discussed, surgical/procedural consents, equipment checked, pre-op evaluation, timeout performed, anesthesia consent given, oxygen available and monitors applied/VS acknowledged  Peripheral Block   Patient position: supine  Prep: ChloraPrep  Provider prep: mask and sterile gloves (Sterile probe cover)  Patient monitoring: cardiac monitor, continuous pulse ox, frequent blood pressure checks and IV access  Block type: Brachial plexus  Interscalene  Laterality: right  Injection technique: single-shot  Guidance: nerve stimulator and ultrasound guided  Local infiltration: ropivacaine  Infiltration strength: 0.5 %  Local infiltration: ropivacaine  Dose: 30 mL    Needle   Needle type: combined needle/nerve stimulator   Needle gauge: 22 G  Needle localization: anatomical landmarks and ultrasound guidance  Needle length: 5 cm  Assessment   Injection assessment: negative aspiration for heme, no paresthesia on injection and local visualized surrounding nerve on ultrasound  Paresthesia pain: immediately resolved  Slow fractionated injection: yes  Hemodynamics: stable  Real-time US image taken/store: yes  Outcomes: uncomplicated    Additional Notes  Ultrasound image printed and saved in patient chart.     Sterile probe cover used

## 2023-07-18 NOTE — PROGRESS NOTES
Patient arrived from PACU on 4lt of oxygen. Pt states she feels some congestion, Respiratory in to see patient. Friend to bring patients Cpap for HS. VS stable, pt is alert and oriented, food ordered, friend at her side. Dr Neftali Simon here to see patient. Pt is planning on going home tomorrow, her neighbors and friends are going to be her help at home. Call light in reach.

## 2023-07-18 NOTE — PROGRESS NOTES
Facility/Department: [facility] MED SURG UNIT  Occupational Therapy Initial Assessment    Name: Ricky Reddy  :   MRN: [id number]  Date of Service: 2023    Discharge Recommendations:  Continue to assess pending progress (outpatient PT/OT)          Patient Diagnosis(es): There were no encounter diagnoses. Past Medical History:  has a past medical history of Asthma, CAD (coronary artery disease), Hypertension, Kidney stone, Sleep apnea, and Type 2 diabetes mellitus without complication ([address]). Past Surgical History:  has a past surgical history that includes Tonsillectomy and adenoidectomy (); Carpal tunnel release (Bilateral, 80's); Rotator cuff repair (Left, 2004); Rotator cuff repair (Right, 2005); ovarian cyst removal (2008); knee surgery (Bilateral); joint replacement (Left, 2010); knee surgery (Left, 2016); Eye surgery (Right, 2017); and Colonoscopy (N/A, 2022). Treatment Diagnosis: Decreased ADL/IADL status d/t R RTC repair      Assessment   Performance deficits / Impairments: Decreased functional mobility ; Decreased high-level IADLs;Decreased ADL status; Decreased endurance;Decreased ROM; Decreased sensation;Decreased strength;Decreased balance;Decreased safe awareness  Assessment: Pt is 77 y/o F admitted [facility] for R RTC revision performed arthroscopically by Dr. Devin Sanchez. Pt had RTC approx 20 years ago and had other shoulder repaired as well. PLOF pt is independent with all ADLs/IADLs, active and did yardwork for herself and neighbors. Pt is a retired . She has a one floor home with a tub shower, has shower chair in attic but does not plan to use. Pt has AE/DME from caring for mother and brother. OT joe completed with pt performing functional mobility at [facility], had one episode of unsteadiness d/t turning too quickly and was able to control balance.  Pt confident in returning home tomorrow with support of neighbors if she needs assist and will

## 2023-07-18 NOTE — BRIEF OP NOTE
Brief Postoperative Note      Patient: Nayeli Genao  YOB: 1955  MRN: [id number]    Date of Procedure: 7/18/2023    Pre-Op Diagnosis Codes:     * Tear of right rotator cuff, unspecified tear extent, unspecified whether traumatic [M75.101]    Post-Op Diagnosis: Same       Procedure(s):  RIGHT SHOULDER ARTHROSCOPY ROTATOR CUFF REPAIR REVISION LATERAL, [address]    Surgeon(s):  Cleopatra Verma MD    Assistant:  Physician Assistant: Guanako Silva PA-C    Anesthesia: General    Estimated Blood Loss (mL): Minimal    Complications: None    Specimens:   * No specimens in log *    Implants:  Implant Name Type Inv.  Item Serial No.  Lot No. LRB No. Used Action   SYSTEM IMPL INCL 2 4.75MM BIOCOMPOSITE Michael Sanchezman - [id number]  SYSTEM IMPL INCL 2 4.75MM BIOCOMPOSITE Michael Dustman  [employer] [id number] Right 1 Implanted   [facility] SUTURE BIOCOMP 4.75X19.1 MM Berenice Miranda [id number]  [facility] SUTURE BIOCOMP 4.75X19.1 MM [facility] INC-WD [id number] Right 1 Implanted         Drains: * No LDAs found *    Findings: none      Electronically signed by Cleopatra Verma MD on 7/18/2023 at 10:19 AM

## 2023-07-18 NOTE — PROGRESS NOTES
Facility/Department: [facility] MED SURG UNIT  Physical Therapy Initial Assessment - Post op    Name: Tomás Diaz  : 1955  MRN: [id number]  Date of Service: 2023    Discharge Recommendations:  Outpatient PT, Home with assist PRN          Patient Diagnosis(es): s/p right RTC repair/revision     Past Medical History:  has a past medical history of Asthma, CAD (coronary artery disease), Hypertension, Kidney stone, Sleep apnea, and Type 2 diabetes mellitus without complication ([address]). Past Surgical History:  has a past surgical history that includes Tonsillectomy and adenoidectomy (); Carpal tunnel release (Bilateral, 80's); Rotator cuff repair (Left, 2004); Rotator cuff repair (Right, 2005); ovarian cyst removal (2008); knee surgery (Bilateral); joint replacement (Left, 2010); knee surgery (Left, 2016); Eye surgery (Right, 2017); and Colonoscopy (N/A, 2022). Assessment   Assessment: Pt is a 75 yo female who had a right RTC revision on 23 and had right ultrasling donned. PT completed evaluation and had pt ambulate in the hallway and up/down 4 steps x 2 trials all with safe gait pattern and no LOB noted. Pt returned to the bed and needed no extra assistance. Pt has no PT needs at this time and pt is agreeable. Pt is planning to attend outpt therapy for continued rehab.   Treatment Diagnosis: s/p R RTC revision  Therapy Prognosis: Good  Decision Making: Medium Complexity  No Skilled PT: Independent with functional mobility   Requires PT Follow-Up: No     Plan   Physcial Therapy Plan  Additional Comments: NO PT NEEDS AT THIS TIME  Safety Devices  Type of Devices: Call light within reach, Left in bed, Gait belt     Restrictions  Restrictions/Precautions  Restrictions/Precautions: Surgical Protocols, Weight Bearing, Up as Tolerated (NWB RUE)  Required Braces or Orthoses?: Yes (Ultrasling AAT)  Upper Extremity Weight Bearing Restrictions  Right Upper Extremity Weight EOB<>toilet)  Transfer Training  Transfer Training: Yes  Overall Level of Assistance: Stand-by assistance (With no device)  Interventions: Safety awareness training;Verbal cues  Sit to Stand: Stand-by assistance  Stand to Sit: Stand-by assistance  Toilet Transfer: Stand-by assistance  Bed mobility  Rolling to Right: Modified independent  Supine to Sit: Modified independent  Sit to Supine: Modified independent     Ambulation  WB Status: full Wbing B LE  Ambulation  Surface: Level tile  Device: No Device  Assistance: Stand by assistance;Contact guard assistance  Quality of Gait: Pt ambulated in hallway pushing the IV pole with safe gait pattern and equal step length B LE and no LOB noted for 55'x 2.  Gait Deviations: None  Distance: 55'x 2  Stairs/Curb  Stairs?: Yes  Stairs  # Steps : 4  Stairs Height: 6\"  Rails: Left ascending  Device: No Device  Assistance: Stand by assistance;Contact guard assistance  Comment: Pt was able to ambulate up/down 2  6\" steps x 2 trials with a safe gait pattern. Advised pt at home to face her doorjam if no rail to hold onto.                     Goals  Patient Goals   Patient Goals : NO PT NEEDS AT THIS TIME       Therapy Time   Individual Concurrent Group Co-treatment   Time In  4:05pm         Time Out  4:50pm         Minutes  45 min                  Zara Donovan PT

## 2023-07-19 VITALS
OXYGEN SATURATION: 98 % | SYSTOLIC BLOOD PRESSURE: 136 MMHG | DIASTOLIC BLOOD PRESSURE: 80 MMHG | HEART RATE: 80 BPM | TEMPERATURE: 97.8 F | HEIGHT: 60 IN | WEIGHT: 200 LBS | BODY MASS INDEX: 39.27 KG/M2 | RESPIRATION RATE: 18 BRPM

## 2023-07-19 PROBLEM — M75.100 ROTATOR CUFF TEAR: Status: ACTIVE | Noted: 2023-07-19

## 2023-07-19 PROCEDURE — 97535 SELF CARE MNGMENT TRAINING: CPT

## 2023-07-19 PROCEDURE — G0378 HOSPITAL OBSERVATION PER HR: HCPCS

## 2023-07-19 PROCEDURE — 6370000000 HC RX 637 (ALT 250 FOR IP): Performed by: ORTHOPAEDIC SURGERY

## 2023-07-19 PROCEDURE — 6360000002 HC RX W HCPCS: Performed by: ORTHOPAEDIC SURGERY

## 2023-07-19 PROCEDURE — 2580000003 HC RX 258: Performed by: STUDENT IN AN ORGANIZED HEALTH CARE EDUCATION/TRAINING PROGRAM

## 2023-07-19 PROCEDURE — 6370000000 HC RX 637 (ALT 250 FOR IP): Performed by: INTERNAL MEDICINE

## 2023-07-19 PROCEDURE — 2580000003 HC RX 258: Performed by: ORTHOPAEDIC SURGERY

## 2023-07-19 PROCEDURE — 97530 THERAPEUTIC ACTIVITIES: CPT

## 2023-07-19 RX ORDER — POLYETHYLENE GLYCOL 3350 17 G/17G
17 POWDER, FOR SOLUTION ORAL ONCE
Status: COMPLETED | OUTPATIENT
Start: 2023-07-19 | End: 2023-07-19

## 2023-07-19 RX ORDER — SODIUM CHLORIDE, SODIUM LACTATE, POTASSIUM CHLORIDE, CALCIUM CHLORIDE 600; 310; 30; 20 MG/100ML; MG/100ML; MG/100ML; MG/100ML
INJECTION, SOLUTION INTRAVENOUS
Status: DISCONTINUED
Start: 2023-07-19 | End: 2023-07-19

## 2023-07-19 RX ORDER — SENNOSIDES A AND B 8.6 MG/1
2 TABLET, FILM COATED ORAL ONCE
Status: COMPLETED | OUTPATIENT
Start: 2023-07-19 | End: 2023-07-19

## 2023-07-19 RX ADMIN — GLIPIZIDE 5 MG: 5 TABLET ORAL at 08:18

## 2023-07-19 RX ADMIN — METOPROLOL SUCCINATE 150 MG: 50 TABLET, EXTENDED RELEASE ORAL at 08:11

## 2023-07-19 RX ADMIN — CEFAZOLIN 2000 MG: 1 INJECTION, POWDER, FOR SOLUTION INTRAMUSCULAR; INTRAVENOUS at 02:54

## 2023-07-19 RX ADMIN — DILTIAZEM HYDROCHLORIDE 120 MG: 120 CAPSULE, EXTENDED RELEASE ORAL at 08:11

## 2023-07-19 RX ADMIN — LOSARTAN POTASSIUM 25 MG: 25 TABLET, FILM COATED ORAL at 08:11

## 2023-07-19 RX ADMIN — SENNOSIDES 17.2 MG: 8.6 TABLET, FILM COATED ORAL at 09:12

## 2023-07-19 RX ADMIN — METFORMIN HYDROCHLORIDE 1000 MG: 500 TABLET ORAL at 08:11

## 2023-07-19 RX ADMIN — CHLORTHALIDONE 25 MG: 25 TABLET ORAL at 08:11

## 2023-07-19 RX ADMIN — OXYCODONE HYDROCHLORIDE 5 MG: 5 TABLET ORAL at 09:11

## 2023-07-19 RX ADMIN — SODIUM CHLORIDE, POTASSIUM CHLORIDE, SODIUM LACTATE AND CALCIUM CHLORIDE: 600; 310; 30; 20 INJECTION, SOLUTION INTRAVENOUS at 06:37

## 2023-07-19 RX ADMIN — POLYETHYLENE GLYCOL 3350 17 G: 17 POWDER, FOR SOLUTION ORAL at 09:13

## 2023-07-19 RX ADMIN — ASPIRIN 81 MG CHEWABLE TABLET 81 MG: 81 TABLET CHEWABLE at 08:11

## 2023-07-19 ASSESSMENT — PAIN DESCRIPTION - ORIENTATION: ORIENTATION: RIGHT

## 2023-07-19 ASSESSMENT — PAIN DESCRIPTION - LOCATION: LOCATION: SHOULDER

## 2023-07-19 ASSESSMENT — PAIN SCALES - GENERAL: PAINLEVEL_OUTOF10: 7

## 2023-07-19 ASSESSMENT — PAIN DESCRIPTION - DESCRIPTORS: DESCRIPTORS: ACHING

## 2023-07-19 NOTE — PLAN OF CARE
Pt is in bed watching tv. She is A/O/ x 4, 1 Assist. She takes her po medications whole w/ water. Pt wears a Cpap at HS. Last BM on 07/18/2023, no c/o constipation, abd, pain, or diarrhea. She has a IV of LR running at 125 ml/hr, in her left arm that is clean, dry, and intact. Pt is on ATB ANCEF therapy. She has a sling on her Right arm, (+) sesation to touch, but still feels \"numb\" ,she states. No c/o pain at this time. Call light is w/in reach. She is in bed resting comfortably. Will continue to monitor.

## 2023-07-19 NOTE — PLAN OF CARE
Problem: Discharge Planning  Goal: Discharge to home or other facility with appropriate resources  7/19/2023 1306 by Rebecca Bennett RN  Outcome: Completed  7/18/2023 2351 by Latasha Jensen RN  Outcome: Progressing     Problem: Pain  Goal: Verbalizes/displays adequate comfort level or baseline comfort level  7/19/2023 1306 by Rebecca Bennett RN  Outcome: Completed  7/18/2023 2351 by Latasha Jensen RN  Outcome: Progressing     Problem: Safety - Adult  Goal: Free from fall injury  7/19/2023 1314 by Rebecca Bennett RN  Outcome: Completed  7/18/2023 2351 by Latasha Jensen RN  Outcome: Progressing

## 2023-07-19 NOTE — DISCHARGE SUMMARY
Discharge summary  This patient Ofelia Morfin was admitted to the hospital on 7/18/2023  after undergoing Procedure(s) (LRB):  RIGHT SHOULDER ARTHROSCOPY ROTATOR CUFF REPAIR REVISION LATERAL, [employer]THREX SCALENE BLOCK (Right) without complications that morning. During the postoperative period,while in hospital, patient was medically managed by the hospitalist. Please see medial notes and H&P for patients additional diagnoses. Ortho agrees with all medical diagnoses and treatments while patient in hospital.    No significant or unexpected findings noted during hospital stay. ..... Hospital course  Patient tolerated surgical procedure well and there was no complications. Patient progressed adequtly through their recovery during hospital stay including PT and rehabilitation. DVT prophylaxis was implemented POD#1  Patient was then D/C on  to Home  in stable condition. Patient was instructed on the use of pain medications, the signs and symptoms of infection, and was given our number to call should they have any questions or concerns following discharge.

## 2023-07-19 NOTE — PROGRESS NOTES
Facility/Department: [facility] MED SURG UNIT  Daily Treatment Note  NAME: Mouna Singleton  : 1955  MRN: [id number]    Date of Service: 2023    Discharge Recommendations:  Continue to assess pending progress (outpatient when appropriate)         Patient Diagnosis(es): The encounter diagnosis was S/P rotator cuff repair. Assessment    Assessment: Pt presents sitting EOB with RN present. Pt reports 7/10 pain and received pain meds Currently on RA and 94%. She's agreeable to shower with OT. Educated pt on self care strategies to maximize independence and safety while adhering to precautions. Provided handout on how to doff/vickie sling and practiced the SWAT method, also provided handout on distal AROM exercises to begin once cleared pt Dr. Alexandra Robledo. Answered questions about homegoing and reviewed meal prep. Pt in bed upon OT departure with all needs within reach. Activity Tolerance: Patient tolerated treatment well;Patient limited by pain  Discharge Recommendations: Continue to assess pending progress (outpatient when appropriate)      Plan   Occupational Therapy Plan  Times Per Week: 4-7  Times Per Day: Once a day  Current Treatment Recommendations: Strengthening;Balance training;Equipment evaluation, education, & procurement;Home management training; Safety education & training;Patient/Caregiver education & training;Self-Care / ADL;Positioning; Functional mobility training;Pain management; Endurance training     Restrictions   NWB RUEdarren AAT    Subjective   Subjective  Subjective: \"Let me see if I can do this for myself. \"  Pain: 7/10 R shoulder  Orientation  Overall Orientation Status: Within Normal Limits  Orientation Level: Oriented X4  Pain: R shoulder 7/10  Cognition  Overall Cognitive Status: WNL        Objective    Bed Mobility Training  Bed Mobility Training: Yes  Overall Level of Assistance: Modified independent  Supine to Sit: Modified independent  Sit to Supine: Modified

## 2023-07-19 NOTE — PROGRESS NOTES
Anette Burgos Initial Discharge Assessment    Met with Patient to discuss discharge plan. PCP: PRASHANT Barrow - [facility]                                  Date of Last Visit: July 5th    If no PCP, list provided? N/A    Discharge Planning    Living Arrangements: independently at home    Who do you live with? Self     Who helps you with your care If lives at home: Patient reports to have been independent for ADLS prior to recent surgery      Do you have any barriers navigating in your home? no    Patient can perform ADL? Yes    Current Services (outpatient and in home) :  None    Dialysis: No    Is transportation available to get to your appointments? Yes    DME Equipment:  yes - grab bars, walker, cane, handicap toliet. Patient identifies no DME needs at this time    Respiratory equipment: None    Respiratory provider:  no    Does patient feel safe at home? Yes    Does patient identify any home going needs? No    Patient agreeable to [facility]? N/A    Patient agreeable to SNF/Rehab? N/A    Other discharge needs identified? N/A    Was Freedom of Choice Provided? Yes    Initial Discharge Plan? (Note: please see concurrent daily documentation for any updates after initial note). Chart reviewed. Patient was admitted to [facility] under out patient in a bed status post repair of right rotator cuff. Patient was evaluated by PT/OT and therapies recommending out patient therapy when appropriate It is worth noting that patient currently has NWB and range of motion restrictions. This  met with patient in hospital room to discuss DC planning and help at home needs. Upon visit patient is alert and sitting up in hospital bed wearing clean clothing from home and sling on arm. Patient  has two visitors at bedside. Patient identifies visitors as close friends that plan to aid with care needs. Patient reports plan to return home independently with prn assistance from friends and neighbors.

## 2023-07-19 NOTE — PROGRESS NOTES
Pt discharged and walked down to vehicle with family. Assisted on she didn't want to get pushed down in wheelchair or needed help. IV removed, cathter intact. Med scripts sent over to pharmacy and filled prior to pt getting surgery. Discharge instructions and post op instructions discussed with pt and no further questions asked.

## 2023-07-19 NOTE — PLAN OF CARE
Problem: Safety - Adult  Goal: Free from fall injury  7/18/2023 2351 by Arash Inman RN  Outcome: Progressing

## 2023-07-19 NOTE — OP NOTE
[address] [address]                [address] [facility], [address] [facility]                                OPERATIVE REPORT    PATIENT NAME: Prerna Livingston                    :        1955  MED REC NO:   [id number]                              ROOM:       5214  ACCOUNT NO:   [de-identified]                           ADMIT DATE: 2023  PROVIDER:     Huong Burns MD    DATE OF PROCEDURE:  2023    PREOPERATIVE DIAGNOSES:  1. Right shoulder rotator cuff tear. 2.  Right shoulder impingement. 3.  Right shoulder biceps tendonitis. POSTOPERATIVE DIAGNOSES:  1. Right shoulder rotator cuff tear. 2.  Right shoulder impingement. 3.  Right shoulder biceps tendonitis. OPERATIONS PERFORMED:  1. Right shoulder arthroscopic rotator cuff repair. 2.  Right shoulder arthroscopic subacromial decompression. 3.  Right shoulder arthroscopic extensive debridement of glenohumeral  joint, labrum, biceps, and rotator cuff fraying. 4.  Right shoulder arthroscopic biceps tenotomy. SURGEON:  Huong Burns MD    ASSISTANT:  Bradley Gregg PA-C    ANESTHESIA:  General endotracheal plus block. COMPLICATIONS:  None. EBL:  Minimal.    INDICATIONS:  The patient is a 80-year-old female with history of large  right rotator cuff tear. She had failed multiple conservative measures  and elected to proceed with rotator cuff repair. Risks and benefits of  surgical intervention were discussed at length with the patient and the  family. These include infection, stiffness, nerve damage, continued  pain and deformity, as well as need for subsequent operations. We  specifically discussed the failure of all tears of the size to heal.   Understanding these options and limitations, she elected to proceed. Informed consent was obtained prior to arrival in the operating room. OPERATIVE PROCEDURE:  Upon arrival, the patient was identified.   She was  transported from a stretcher to

## 2023-09-28 NOTE — TELEPHONE ENCOUNTER
Pt is requesting medication refill.  Please approve or deny this request.    Rx requested:  Requested Prescriptions     Pending Prescriptions Disp Refills    Semaglutide,0.25 or 0.5MG/DOS, 2 MG/1.5ML SOPN 2 Adjustable Dose Pre-filled Pen Syringe 1     Sig: Inject 0.25 mg into the skin every 7 days         Last Office Visit:   7/5/2023      Next Visit Date:  Future Appointments   Date Time Provider [address]   12/20/2023  8:30 AM Joyce Wang MD [facility]

## 2023-09-29 DIAGNOSIS — E11.40 TYPE 2 DIABETES MELLITUS WITH DIABETIC NEUROPATHY, UNSPECIFIED WHETHER LONG TERM INSULIN USE (HCC): Primary | ICD-10-CM

## 2023-10-04 ENCOUNTER — TREATMENT (OUTPATIENT)
Dept: PHYSICAL THERAPY | Facility: CLINIC | Age: 68
End: 2023-10-04
Payer: MEDICARE

## 2023-10-04 DIAGNOSIS — R29.898 SHOULDER WEAKNESS: ICD-10-CM

## 2023-10-04 DIAGNOSIS — M75.101 TEAR OF RIGHT ROTATOR CUFF: Primary | ICD-10-CM

## 2023-10-04 DIAGNOSIS — G89.29 CHRONIC RIGHT SHOULDER PAIN: ICD-10-CM

## 2023-10-04 DIAGNOSIS — M25.511 CHRONIC RIGHT SHOULDER PAIN: ICD-10-CM

## 2023-10-04 PROCEDURE — 97110 THERAPEUTIC EXERCISES: CPT | Performed by: PHYSICAL THERAPIST

## 2023-10-04 ASSESSMENT — ENCOUNTER SYMPTOMS
LOSS OF SENSATION IN FEET: 1
OCCASIONAL FEELINGS OF UNSTEADINESS: 0
DEPRESSION: 0

## 2023-10-04 ASSESSMENT — PAIN SCALES - GENERAL: PAINLEVEL_OUTOF10: 1

## 2023-10-04 ASSESSMENT — PAIN - FUNCTIONAL ASSESSMENT: PAIN_FUNCTIONAL_ASSESSMENT: 0-10

## 2023-10-04 NOTE — PROGRESS NOTES
Physical Therapy    Physical Therapy Treatment    Patient Name: Andra Russell  MRN: [id number]  Today's Date: 10/4/2023  Time Calculation  Start Time: 0925  Stop Time: 1010  Time Calculation (min): 45 min      Assessment:  Her Rt shoulder AROM:  Flexion 130 degrees, Abduction 100 degrees, ER 45 degrees.  She's able to reach behind her back to her buttock and behind her head to C5.  Strength: Flexion 4-/5, Abduction and ER 3+/5 and IR 4/5.  She continues with some decreased function with her basic and instrumental ADL's.         Plan:  Continue with skilled PT to decrease Rt shoulder pain, increase AROM, strength and overall function.         Current Problem  1. Tear of right rotator cuff        2. Shoulder weakness        3. Chronic right shoulder pain            Subjective   Pt reports her Rt shoulder is a little achy this morning.    11 out of 18 PT treatments    Rt RCR, SAD, debridement and biceps tenotomy 7/18/23     Precautions  Precautions  STEADI Fall Risk Score (The score of 4 or more indicates an increased risk of falling): 2  Post-Surgical Precautions:  (P/AROM, no weight greater than 1 lb)       Pain  Pain Assessment: 0-10  Pain Score: 1  Pain Type: Chronic pain  Pain Location: Shoulder  Pain Orientation:  (Rt shoulder)    Objective     3+/5 with Rt shoulder abduction and ER  4-/5 with Rt shoulder flexion  4/5 with Rt shoulder IR            Outcome Measures:  QuickDASH 40.90% at Reeval, 9/27/23    Treatments:  UBE, F/R, L3, 3 minutes each way  Finger ladder, 10 reps  Wall slides, flexion and scaption, 30 reps  Wall alphabet, 1 rep  IR stretch with strap, 10 sec hold, 10 reps *  Posterior capsule stretch, 10 sec hold, 10 reps *  Sitting and supine ER stretch, 10 sec hold, 10 reps *  Standing wand ex's (5) *  Side lying shoulder 4-way, 10 reps x 2 *    Therapeutic Exercise  Therapeutic Exercise Performed:  (free text)       Goals:  1:  No Rt shoulder pain.  2:  Rt shoulder AROM WFL's and pain free.  3:   4+-5/5 gross Rt shoulder strength.  4:  No difficulty with her basic and instrumental ADL's.  5:  Independent with HEP.

## 2023-10-05 PROBLEM — L03.90 CELLULITIS: Status: ACTIVE | Noted: 2023-10-05

## 2023-10-05 PROBLEM — Z86.010 HISTORY OF COLON POLYPS: Status: ACTIVE | Noted: 2022-07-27

## 2023-10-05 PROBLEM — M51.37 DDD (DEGENERATIVE DISC DISEASE), LUMBOSACRAL: Status: ACTIVE | Noted: 2019-07-16

## 2023-10-05 PROBLEM — R13.10 DYSPHAGIA: Status: ACTIVE | Noted: 2023-10-05

## 2023-10-05 PROBLEM — G47.33 OSA (OBSTRUCTIVE SLEEP APNEA): Status: ACTIVE | Noted: 2022-09-18

## 2023-10-05 PROBLEM — M51.379 DDD (DEGENERATIVE DISC DISEASE), LUMBOSACRAL: Status: ACTIVE | Noted: 2019-07-16

## 2023-10-05 PROBLEM — M54.9 BACK PAIN: Status: ACTIVE | Noted: 2023-10-05

## 2023-10-05 PROBLEM — H43.811 POSTERIOR VITREOUS DETACHMENT OF RIGHT EYE: Status: ACTIVE | Noted: 2023-05-02

## 2023-10-05 PROBLEM — I73.9 CLAUDICATION (CMS-HCC): Status: ACTIVE | Noted: 2023-04-14

## 2023-10-05 PROBLEM — E66.01 MORBID OBESITY (MULTI): Status: ACTIVE | Noted: 2020-10-01

## 2023-10-05 PROBLEM — M19.90 OSTEOARTHRITIS, CHRONIC: Status: ACTIVE | Noted: 2023-10-05

## 2023-10-05 PROBLEM — I25.10 CORONARY ARTERY DISEASE DUE TO LIPID RICH PLAQUE: Status: ACTIVE | Noted: 2023-06-08

## 2023-10-05 PROBLEM — G62.9 NEUROPATHY: Status: ACTIVE | Noted: 2019-03-08

## 2023-10-05 PROBLEM — M75.100 ROTATOR CUFF TEAR: Status: ACTIVE | Noted: 2023-07-19

## 2023-10-05 PROBLEM — H61.20 CERUMEN IMPACTION: Status: ACTIVE | Noted: 2023-10-05

## 2023-10-05 PROBLEM — M54.16 LUMBAR RADICULOPATHY: Status: ACTIVE | Noted: 2023-10-05

## 2023-10-05 PROBLEM — R39.89 URINE DISCOLORATION: Status: ACTIVE | Noted: 2023-10-05

## 2023-10-05 PROBLEM — I25.83 CORONARY ARTERY DISEASE DUE TO LIPID RICH PLAQUE: Status: ACTIVE | Noted: 2023-06-08

## 2023-10-05 PROBLEM — E11.9 DIABETES MELLITUS (MULTI): Status: ACTIVE | Noted: 2023-10-05

## 2023-10-05 PROBLEM — G25.0 ESSENTIAL TREMOR: Status: ACTIVE | Noted: 2017-06-01

## 2023-10-05 PROBLEM — Z98.890 S/P YAG CAPSULOTOMY, BILATERAL: Status: ACTIVE | Noted: 2022-07-18

## 2023-10-05 PROBLEM — E11.40 TYPE 2 DIABETES MELLITUS WITH DIABETIC NEUROPATHY (MULTI): Status: ACTIVE | Noted: 2022-05-25

## 2023-10-05 PROBLEM — E78.6 LOW HDL (UNDER 40): Status: ACTIVE | Noted: 2019-09-09

## 2023-10-05 PROBLEM — E11.65 TYPE 2 DIABETES MELLITUS WITH HYPERGLYCEMIA (MULTI): Status: ACTIVE | Noted: 2022-06-02

## 2023-10-05 PROBLEM — J45.909 ASTHMA (HHS-HCC): Status: ACTIVE | Noted: 2017-06-01

## 2023-10-05 PROBLEM — G57.60 MORTON'S NEUROMA: Status: ACTIVE | Noted: 2023-10-05

## 2023-10-05 PROBLEM — Z86.0100 HISTORY OF COLON POLYPS: Status: ACTIVE | Noted: 2022-07-27

## 2023-10-05 PROBLEM — G57.63 MORTON'S NEUROMA OF BOTH FEET: Status: ACTIVE | Noted: 2019-03-08

## 2023-10-05 PROBLEM — R07.9 CHEST PAIN: Status: ACTIVE | Noted: 2022-01-21

## 2023-10-05 PROBLEM — I10 ESSENTIAL HYPERTENSION: Status: ACTIVE | Noted: 2019-09-09

## 2023-10-05 PROBLEM — L98.9 SKIN LESION: Status: ACTIVE | Noted: 2023-10-05

## 2023-10-05 PROBLEM — S83.412A SPRAIN OF MEDIAL COLLATERAL LIGAMENT OF LEFT KNEE: Status: ACTIVE | Noted: 2017-06-01

## 2023-10-05 RX ORDER — GLIMEPIRIDE 1 MG/1
1 TABLET ORAL DAILY
COMMUNITY
Start: 2016-04-29

## 2023-10-05 RX ORDER — MELOXICAM 15 MG/1
1 TABLET ORAL DAILY
COMMUNITY
Start: 2023-03-06

## 2023-10-05 RX ORDER — TRAMADOL HYDROCHLORIDE 50 MG/1
TABLET ORAL
COMMUNITY
Start: 2021-05-16

## 2023-10-05 RX ORDER — METOPROLOL TARTRATE AND HYDROCHLOROTHIAZIDE 100; 25 MG/1; MG/1
1 TABLET ORAL DAILY
COMMUNITY
Start: 2015-04-15

## 2023-10-05 RX ORDER — CHLORTHALIDONE 25 MG/1
TABLET ORAL
COMMUNITY

## 2023-10-05 RX ORDER — LORATADINE 10 MG/1
1 TABLET ORAL DAILY PRN
COMMUNITY
Start: 2016-04-29

## 2023-10-05 RX ORDER — METOPROLOL SUCCINATE 100 MG/1
TABLET, EXTENDED RELEASE ORAL
COMMUNITY

## 2023-10-05 RX ORDER — MONTELUKAST SODIUM 10 MG/1
1 TABLET ORAL EVERY EVENING
COMMUNITY

## 2023-10-05 RX ORDER — GLIMEPIRIDE 2 MG/1
TABLET ORAL
COMMUNITY

## 2023-10-05 RX ORDER — BUDESONIDE 0.25 MG/2ML
INHALANT ORAL
COMMUNITY
Start: 2019-07-16

## 2023-10-05 RX ORDER — SEMAGLUTIDE 0.68 MG/ML
INJECTION, SOLUTION SUBCUTANEOUS
COMMUNITY
Start: 2023-08-29

## 2023-10-05 RX ORDER — IPRATROPIUM BROMIDE 21 UG/1
SPRAY, METERED NASAL
COMMUNITY
Start: 2021-02-08

## 2023-10-05 RX ORDER — CALCIUM CARBONATE/VITAMIN D3 600 MG-10
TABLET ORAL
COMMUNITY
Start: 2015-04-15

## 2023-10-05 RX ORDER — DILTIAZEM HYDROCHLORIDE 120 MG/1
CAPSULE, COATED, EXTENDED RELEASE ORAL
COMMUNITY
Start: 2023-09-17

## 2023-10-05 RX ORDER — ASPIRIN 81 MG/1
1 TABLET ORAL DAILY
COMMUNITY

## 2023-10-05 RX ORDER — LOSARTAN POTASSIUM 25 MG/1
1 TABLET ORAL DAILY
COMMUNITY

## 2023-10-05 RX ORDER — ATORVASTATIN CALCIUM 20 MG/1
TABLET, FILM COATED ORAL
COMMUNITY

## 2023-10-05 RX ORDER — IBUPROFEN 600 MG/1
TABLET ORAL
COMMUNITY
Start: 2022-12-31

## 2023-10-05 RX ORDER — BLOOD-GLUCOSE METER
EACH MISCELLANEOUS
COMMUNITY
Start: 2023-03-18

## 2023-10-05 RX ORDER — ALBUTEROL SULFATE 90 UG/1
2 AEROSOL, METERED RESPIRATORY (INHALATION) EVERY 4 HOURS PRN
COMMUNITY
Start: 2015-04-15

## 2023-10-05 RX ORDER — NIACIN (INOSITOL NIACINATE) 400(500MG)
CAPSULE ORAL
COMMUNITY
Start: 2022-04-26

## 2023-10-05 RX ORDER — METFORMIN HYDROCHLORIDE 850 MG/1
1 TABLET ORAL
COMMUNITY
Start: 2014-08-15

## 2023-10-05 RX ORDER — MULTIVIT WITH IRON,MINERALS
TABLET ORAL
COMMUNITY

## 2023-10-05 RX ORDER — MINERAL OIL
ENEMA (ML) RECTAL
COMMUNITY
Start: 2021-12-21

## 2023-10-05 RX ORDER — METFORMIN HYDROCHLORIDE 1000 MG/1
TABLET ORAL
COMMUNITY

## 2023-10-06 ENCOUNTER — TREATMENT (OUTPATIENT)
Dept: PHYSICAL THERAPY | Facility: CLINIC | Age: 68
End: 2023-10-06
Payer: MEDICARE

## 2023-10-06 DIAGNOSIS — E11.65 TYPE 2 DIABETES MELLITUS WITH HYPERGLYCEMIA, WITHOUT LONG-TERM CURRENT USE OF INSULIN (HCC): Primary | ICD-10-CM

## 2023-10-06 DIAGNOSIS — R29.898 SHOULDER WEAKNESS: ICD-10-CM

## 2023-10-06 DIAGNOSIS — S46.011D TRAUMATIC COMPLETE TEAR OF RIGHT ROTATOR CUFF, SUBSEQUENT ENCOUNTER: Primary | ICD-10-CM

## 2023-10-06 DIAGNOSIS — M25.511 CHRONIC RIGHT SHOULDER PAIN: ICD-10-CM

## 2023-10-06 DIAGNOSIS — G89.29 CHRONIC RIGHT SHOULDER PAIN: ICD-10-CM

## 2023-10-06 PROCEDURE — 97110 THERAPEUTIC EXERCISES: CPT | Performed by: PHYSICAL THERAPIST

## 2023-10-06 RX ORDER — BLOOD-GLUCOSE SENSOR
1 EACH MISCELLANEOUS
Qty: 1 EACH | Refills: 2 | Status: SHIPPED | OUTPATIENT
Start: 2023-10-06

## 2023-10-06 RX ORDER — TIRZEPATIDE 5 MG/.5ML
5 INJECTION, SOLUTION SUBCUTANEOUS WEEKLY
Qty: 1 ADJUSTABLE DOSE PRE-FILLED PEN SYRINGE | Refills: 2 | Status: SHIPPED | OUTPATIENT
Start: 2023-10-06

## 2023-10-06 ASSESSMENT — ENCOUNTER SYMPTOMS
LOSS OF SENSATION IN FEET: 1
DEPRESSION: 0
OCCASIONAL FEELINGS OF UNSTEADINESS: 0

## 2023-10-06 ASSESSMENT — PAIN - FUNCTIONAL ASSESSMENT: PAIN_FUNCTIONAL_ASSESSMENT: 0-10

## 2023-10-06 ASSESSMENT — PAIN SCALES - GENERAL: PAINLEVEL_OUTOF10: 0 - NO PAIN

## 2023-10-06 NOTE — PROGRESS NOTES
Physical Therapy    Physical Therapy Treatment    Patient Name: Andra Russell  MRN: [id number]  Today's Date: 10/6/2023  Time Calculation  Start Time: 1100  Stop Time: 1145  Time Calculation (min): 45 min      Assessment:  Some increased AROM noted this morning noting Rt shoulder AROM:  Flexion 130 degrees, Abduction 110 degrees, ER 50 degrees. She's able to reach behind her back to her buttock and behind her head to C5.  Strength: Flexion 4-/5, Abduction and ER 3+/5 and IR 4/5.  She continues with some decreased function with her basic and instrumental ADL's.          Plan:  Continue with skilled PT to decrease Rt shoulder pain, increase AROM, strength and overall function.          Current Problem  1. Traumatic complete tear of right rotator cuff, subsequent encounter        2. Shoulder weakness        3. Chronic right shoulder pain            Subjective   Pt states her shoulder was pretty sore yesterday and she just rested and did a few stretches.  She reports a little stiffness today.         Precautions  Precautions  STEADI Fall Risk Score (The score of 4 or more indicates an increased risk of falling): 1       Pain  Pain Assessment: 0-10  Pain Score: 0 - No pain    Objective     Rt shoulder AROM:  flexion 130 degrees, abduction 110 degrees, ER 50 degrees.  She's able to reach behind her back to her buttock and behind her head to C5.  Labored motion reaching behind her back and neck.        3+/5 with Rt shoulder abduction and ER  4-/5 with Rt shoulder flexion  4/5 with Rt shoulder IR                 Treatments:  Therapeutic Exercise   UBE, F/R, L3, 3 minutes each way  Finger ladder, 10 reps  Wall slides, flexion and scaption, 30 reps  Wall alphabet, 1 rep  IR stretch with strap, 10 sec hold, 10 reps *  Posterior capsule stretch, 10 sec hold, 10 reps *  Sitting and supine ER stretch, 10 sec hold, 10 reps *  Standing wand ex's (5) *  Side lying shoulder 4-way, 10 reps x 2 *       Goals:  1:  No Rt shoulder  pain.  2:  Rt shoulder AROM WFL's and pain free.  3:  4+-5/5 gross Rt shoulder strength.  4:  No difficulty with her basic and instrumental ADL's.  5:  Independent with HEP.

## 2023-10-13 ENCOUNTER — TREATMENT (OUTPATIENT)
Dept: PHYSICAL THERAPY | Facility: CLINIC | Age: 68
End: 2023-10-13
Payer: MEDICARE

## 2023-10-13 DIAGNOSIS — G89.29 CHRONIC RIGHT SHOULDER PAIN: ICD-10-CM

## 2023-10-13 DIAGNOSIS — R29.898 SHOULDER WEAKNESS: Primary | ICD-10-CM

## 2023-10-13 DIAGNOSIS — M25.511 CHRONIC RIGHT SHOULDER PAIN: ICD-10-CM

## 2023-10-13 DIAGNOSIS — S46.011D TRAUMATIC COMPLETE TEAR OF RIGHT ROTATOR CUFF, SUBSEQUENT ENCOUNTER: ICD-10-CM

## 2023-10-13 PROCEDURE — 97110 THERAPEUTIC EXERCISES: CPT | Performed by: PHYSICAL THERAPIST

## 2023-10-13 ASSESSMENT — PAIN - FUNCTIONAL ASSESSMENT: PAIN_FUNCTIONAL_ASSESSMENT: 0-10

## 2023-10-13 ASSESSMENT — ENCOUNTER SYMPTOMS
LOSS OF SENSATION IN FEET: 1
DEPRESSION: 0
OCCASIONAL FEELINGS OF UNSTEADINESS: 0

## 2023-10-13 ASSESSMENT — PAIN SCALES - GENERAL: PAINLEVEL_OUTOF10: 0 - NO PAIN

## 2023-10-13 NOTE — PROGRESS NOTES
Physical Therapy    Physical Therapy Treatment    Patient Name: Andra Russell  MRN: [id number]  Today's Date: 10/13/2023  Time Calculation  Start Time: 1015  Stop Time: 1100  Time Calculation (min): 45 min      Assessment:  No change in her Rt shoulder AROM:  Flexion 130 degrees, Abduction 110 degrees, ER 50 degrees.  She's able to reach behind her back to her buttock and behind her head to C5.  Strength: Flexion 4-/5, Abduction and ER 3+/5 and IR 4/5.  Added prone shoulder and scapular ex's today which were challenging for her.  She continues with some decreased function with her basic and instrumental ADL's.            Plan:  Continue with skilled PT to decrease Rt shoulder pain, increase AROM, strength and overall function.          Current Problem  1. Shoulder weakness        2. Chronic right shoulder pain        3. Traumatic complete tear of right rotator cuff, subsequent encounter            Subjective   Pt reports feeling tired this morning but her shoulder is feeling better overall.  Mild pain in her biceps.       13 out of 18 PT treatments      Objective     Rt shoulder AROM:  flexion 130 degrees, abduction 110 degrees, ER 50 degrees.  She's able to reach behind her back to her buttock and behind her head to C5.  Labored motion reaching behind her back and neck.          3+/5 with Rt shoulder abduction and ER  4-/5 with Rt shoulder flexion  4/5 with Rt shoulder IR          Treatments:  UBE, F/R, L3, 3 minutes each way  Finger ladder, 10 reps  Wall slides, flexion and scaption, 30 reps  Wall alphabet, 1 rep  IR stretch with strap, 10 sec hold, 10 reps *  Posterior capsule stretch, 10 sec hold, 10 reps *  Sitting and supine ER stretch, 10 sec hold, 10 reps *  Standing wand ex's (5) *  Side lying shoulder 4-way, 10 reps x 2 *  Prone rows and shoulder extension with scapular retraction, T's (2) and Y's, 10 -20 reps       Goals:1:  No Rt shoulder pain.  2:  Rt shoulder AROM WFL's and pain free.  3:  4+-5/5 gross  Rt shoulder strength.  4:  No difficulty with her basic and instrumental ADL's.  5:  Independent with HEP.

## 2023-10-16 ENCOUNTER — TREATMENT (OUTPATIENT)
Dept: PHYSICAL THERAPY | Facility: CLINIC | Age: 68
End: 2023-10-16
Payer: MEDICARE

## 2023-10-16 ENCOUNTER — HOSPITAL ENCOUNTER (OUTPATIENT)
Dept: DIABETES SERVICES | Age: 68
Setting detail: THERAPIES SERIES
Discharge: HOME | End: 2023-10-16
Payer: MEDICARE

## 2023-10-16 DIAGNOSIS — E11.40 TYPE 2 DIABETES MELLITUS WITH DIABETIC NEUROPATHY, UNSPECIFIED WHETHER LONG TERM INSULIN USE (HCC): Primary | ICD-10-CM

## 2023-10-16 DIAGNOSIS — S46.011D TRAUMATIC COMPLETE TEAR OF RIGHT ROTATOR CUFF, SUBSEQUENT ENCOUNTER: Primary | ICD-10-CM

## 2023-10-16 DIAGNOSIS — G89.29 CHRONIC RIGHT SHOULDER PAIN: ICD-10-CM

## 2023-10-16 DIAGNOSIS — M25.511 CHRONIC RIGHT SHOULDER PAIN: ICD-10-CM

## 2023-10-16 DIAGNOSIS — R29.898 SHOULDER WEAKNESS: ICD-10-CM

## 2023-10-16 PROCEDURE — 97110 THERAPEUTIC EXERCISES: CPT | Performed by: PHYSICAL THERAPIST

## 2023-10-16 PROCEDURE — G0108 DIAB MANAGE TRN  PER INDIV: HCPCS

## 2023-10-16 SDOH — ECONOMIC STABILITY: FOOD INSECURITY: ADDITIONAL INFORMATION: NO

## 2023-10-16 ASSESSMENT — ENCOUNTER SYMPTOMS
OCCASIONAL FEELINGS OF UNSTEADINESS: 0
DEPRESSION: 0
LOSS OF SENSATION IN FEET: 1

## 2023-10-16 ASSESSMENT — PAIN SCALES - GENERAL: PAINLEVEL_OUTOF10: 0 - NO PAIN

## 2023-10-16 ASSESSMENT — PROBLEM AREAS IN DIABETES QUESTIONNAIRE (PAID)
WORRYING ABOUT THE FUTURE AND THE POSSIBILITY OF SERIOUS COMPLICATIONS: 1
FEELING DEPRESSED WHEN YOU THINK ABOUT LIVING WITH DIABETES: 1
PAID-5 TOTAL SCORE: 2
FEELING THAT DIABETES IS TAKING UP TOO MUCH OF YOUR MENTAL AND PHYSICAL ENERGY EVERY DAY: 0
FEELING SCARED WHEN YOU THINK ABOUT LIVING WITH DIABETES: 0
COPING WITH COMPLICATIONS OF DIABETES: 0

## 2023-10-16 ASSESSMENT — SLEEP AND FATIGUE QUESTIONNAIRES
HOW DO YOU RATE THE QUALITY OF YOUR SLEEP: POOR
HAVE YOU EVER BEEN TESTED FOR SLEEP APNEA: YES
HOW MANY HOURS OF SLEEP ARE YOU GETTING, ON AVERAGE: LESS THAN 7

## 2023-10-16 ASSESSMENT — PAIN - FUNCTIONAL ASSESSMENT: PAIN_FUNCTIONAL_ASSESSMENT: 0-10

## 2023-10-16 NOTE — PROGRESS NOTES
Physical Therapy    Physical Therapy Treatment    Patient Name: Andra Russell  MRN: [id number]  Today's Date: 10/16/2023  Time Calculation  Start Time: 0930  Stop Time: 1012  Time Calculation (min): 42 min      Assessment:  No change in her Rt shoulder AROM:  Flexion 135 degrees, Abduction 115 degrees, ER 50 degrees.  She's able to reach behind her back to her buttock and behind her head to C5.  Strength: Flexion 4-/5, Abduction and ER 3+/5 and IR 4/5.  Added shoulder and scapular strenthening in standing with red theraband noting ER with scapular retraction and horizontal abduction being most challenging.  She was given HEP on prone shoulder and scapular ex's today which were challenging for her.  She continues with some decreased function with her basic and instrumental ADL's.            Plan:  Continue with skilled PT to decrease Rt shoulder pain, increase AROM, strength and overall function.          Current Problem  1. Traumatic complete tear of right rotator cuff, subsequent encounter        2. Shoulder weakness        3. Chronic right shoulder pain            Subjective   Pt states her Rt shoulder is feeling good and no pain noted.  She states she was able to grab her Rt hand with her Lt behind her back and pull it across to stretch.    15 out of 18 PT treatments     Precautions  Precautions  STEADI Fall Risk Score (The score of 4 or more indicates an increased risk of falling): 1  Post-Surgical Precautions:  (No weight greater than 1 lb)       Pain  Pain Assessment: 0-10  Pain Score: 0 - No pain    Objective     Rt shoulder AROM:  flexion 135 degrees, abduction 150 degrees, scaption 125 degrees,ER 50 degrees. She's able to reach behind her back to her buttock and behind her head to C5.  Labored motion reaching behind her back and neck.         3+/5 with Rt shoulder abduction and ER  4-/5 with Rt shoulder flexion  4/5 with Rt shoulder IR      Treatments:  UBE, F/R, L3, 3 minutes each way  Finger ladder, 10  reps  Wall slides, flexion and scaption, 30 reps  Wall alphabet, 1 rep  IR stretch with Lt UE, 10 sec hold, 10 reps *  Posterior capsule stretch, 10 sec hold, 10 reps *  Sitting and supine ER stretch, 10 sec hold, 10 reps *  Standing wand ex's (5) *  Side lying shoulder 4-way, 10 reps x 2 *  Prone rows and shoulder extension with scapular retraction, T's (2) and Y's, 10 -20 reps *  Rows, shoulder extension with scapular retractions, ER with scapular retraction and horizontal abduction with RTB, 10-30 reps *        Goals:  1:  No Rt shoulder pain.  2:  Rt shoulder AROM WFL's and pain free.  3:  4+-5/5 gross Rt shoulder strength.  4:  No difficulty with her basic and instrumental ADL's.  5:  Independent with HEP.

## 2023-10-16 NOTE — PROGRESS NOTES
their medications for diabetes listed showing method of action and when to take. Evaluation rating:    Any Changes in Medications? Compliant? []Yes []No     Any side effects? []Yes [] No   Insulin / Injectable - Appropriate injection sites; proper storage; supplies needed; proper technique; safe needle disposal guidelines. Assessment Rating:N/C  10/16/23    [x] Not on insulin    [] New to insulin   Patient is new to insulin and prescribed:     []  Instructed today on type of insulin(s), onset, peak and duration. []  Demonstrated proper administration technique using       []Vial & syringe       []Pen. [] Return demonstration via          [] Self-injection  __U Site:____             [] Demonstrator        []  Reviewed recommended injection sites, proper storage of insulin, Proper needle disposal, importance of blood glucose monitoring when taking insulin, and risk of hypoglycemia. []  Handouts given.     [] Previously on insulin:  and assessed the following:    [] Knowledge of type of insulin - onset, peak and duration of each type    [] Demonstrates Competency      [] Education provided      [] Proper storage of insulin:     [] Demonstrates Competency      [] Education provided          [] Site Management        [] Demonstrates Competency        [] Education provided      [] Injection site currently uses:     [] Evidence of:        [] bruising       [] infection       [] lipoatrophy        [] lipohypertrophy.      [] Injecting near umbilicus or scars/tattoos    [] Rotates sites appropriately    [] Skin Preparation technique:          [] Injection Procedure:       [] Demonstrates Competency        [] Education provided   [] Sterile Technique   [] Rolling to uniformity (if necessary)   [] Pre-injection priming (pen only)   [] Air into vial (Syringe only)   [] Correct order for mixing in same syringe (if necessary)   [] Dosing accuracy   [] Needle insertion   [] Complete injection -

## 2023-10-23 ENCOUNTER — TREATMENT (OUTPATIENT)
Dept: PHYSICAL THERAPY | Facility: CLINIC | Age: 68
End: 2023-10-23
Payer: MEDICARE

## 2023-10-23 DIAGNOSIS — S46.011D TRAUMATIC COMPLETE TEAR OF RIGHT ROTATOR CUFF, SUBSEQUENT ENCOUNTER: Primary | ICD-10-CM

## 2023-10-23 DIAGNOSIS — R29.898 SHOULDER WEAKNESS: ICD-10-CM

## 2023-10-23 DIAGNOSIS — M25.511 CHRONIC RIGHT SHOULDER PAIN: ICD-10-CM

## 2023-10-23 DIAGNOSIS — G89.29 CHRONIC RIGHT SHOULDER PAIN: ICD-10-CM

## 2023-10-23 PROCEDURE — 97110 THERAPEUTIC EXERCISES: CPT | Performed by: PHYSICAL THERAPIST

## 2023-10-23 ASSESSMENT — ENCOUNTER SYMPTOMS
LOSS OF SENSATION IN FEET: 1
DEPRESSION: 0
OCCASIONAL FEELINGS OF UNSTEADINESS: 0

## 2023-10-23 ASSESSMENT — PAIN SCALES - GENERAL: PAINLEVEL_OUTOF10: 0 - NO PAIN

## 2023-10-23 ASSESSMENT — PAIN - FUNCTIONAL ASSESSMENT: PAIN_FUNCTIONAL_ASSESSMENT: 0-10

## 2023-10-23 NOTE — PROGRESS NOTES
Physical Therapy    Physical Therapy Treatment    Patient Name: Andra Russell  MRN: [id number]  Today's Date: 10/23/2023  Time Calculation  Start Time: 0930  Stop Time: 1012  Time Calculation (min): 42 min      Assessment:  Her Rt shoulder AROM is a little better noting 150 degrees of flexion, 140 degrees of abduction, 55 degrees of ER.  She's able to reach behind her head to C7 and labors some reaching behind her back to her buttock.  Strength 4-/5 with flexion and abduction and 3+/5 with ER and 4+/5 IR.  Reviewed some of her prone ex's noting some verbal and visual cueing.  Added Marleen's today noting fair tolerance.         Plan:   Continue with skilled PT to decrease Rt shoulder pain, increase AROM, strength and overall function.           Current Problem  1. Traumatic complete tear of right rotator cuff, subsequent encounter        2. Shoulder weakness        3. Chronic right shoulder pain            Subjective   Pt states her Rt shoulder is feeling good but when she makes certain motions she can get a sharp pain in her biceps     16 out of 18 PT treatments    Precautions  Precautions  STEADI Fall Risk Score (The score of 4 or more indicates an increased risk of falling): 1       Pain  Pain Assessment: 0-10  Pain Score: 0 - No pain  Pain Type: Chronic pain  Pain Location: Shoulder    Objective   Rt shoulder AROM:  flexion 150 degrees, abduction 140 degrees, scaption 140 degrees, ER 55 degrees. She's able to reach behind her back to her buttock and behind her head to C7.   Labored motion reaching behind her back.            4-/5 with Rt shoulder abduction  4-/5 with Rt shoulder flexion  4+/5 with Rt shoulder IR  3+ with ER           Treatments:    UBE, F/R, L3, 3 minutes each way  Finger ladder, 10 reps  Wall slides, flexion and scaption, 1 lb, 30 reps  Wall alphabet, 1 lb, 1 rep  IR stretch with Lt UE, 10 sec hold, 10 reps *  Posterior capsule stretch, 10 sec hold, 10 reps *  Sitting and supine ER stretch, 10 sec  hold, 10 reps *  Standing wand ex's (5) *  Side lying shoulder 4-way, 10 reps x 2 *  Prone rows and shoulder extension with scapular retraction, T's (2) and Y's, 10 -20 reps *  Rows, shoulder extension with scapular retractions, ER with scapular retraction and horizontal abduction with RTB, 10-30 reps *  Marleen's, 10 reps x 3          Goals:   1:  No Rt shoulder pain.  2:  Rt shoulder AROM WFL's and pain free.  3:  4+-5/5 gross Rt shoulder strength.  4:  No difficulty with her basic and instrumental ADL's.  5:  Independent with HEP.

## 2023-10-30 ENCOUNTER — APPOINTMENT (OUTPATIENT)
Dept: PHYSICAL THERAPY | Facility: CLINIC | Age: 68
End: 2023-10-30
Payer: MEDICARE

## 2023-11-01 ENCOUNTER — TELEPHONE (OUTPATIENT)
Dept: DIABETES SERVICES | Age: 68
End: 2023-11-01

## 2023-11-01 NOTE — PROGRESS NOTES
Diabetes Self- Management Education Program Assessment and Education Record-   Also see Diabetic Screening    Patient, Aly Brantley, has been diagnosed with  [] Type 1 [x] Type 2 diabetes. [] Patient is new to diabetes, and here for initial diabetes self-management assessment and education. [x] Patient is here for review of diabetes self-management assessment and education. Today's visit was in an [x] individual [] group setting. Patient came [x] alone [] with family member.     [x] Patient was diagnosed with diabetes in: 2005    Goals setting:  Initial Goal Set with Patient  [x]Yes  [] NO      MEDICAL HISTORY:  Past Medical History:   Diagnosis Date    Asthma     CAD (coronary artery disease)     Hypertension     Kidney stone     Sleep apnea     wears cpap    Type 2 diabetes mellitus without complication ([address])      Family History   Problem Relation Age of Onset    Lung Cancer Mother     Hypertension Mother     Diabetes Father     Hypertension Father     Hypertension Brother     Diabetes Brother     Breast Cancer Maternal Aunt      Salsalate, Molds & smuts, and Percocet [oxycodone-acetaminophen]   Immunization History   Administered Date(s) Administered    COVID-19, PFIZER Bivalent, DO NOT Dilute, (age 12y+), IM, 30 mcg/0.3 mL 09/16/2022    COVID-19, PFIZER GRAY top, DO NOT Dilute, (age 15 y+), IM, 30 mcg/0.3 mL 05/06/2022    COVID-19, PFIZER PURPLE top, DILUTE for use, (age 15 y+), 30mcg/0.3mL 03/01/2021, 03/23/2021, 10/19/2021    Influenza A ([id number]) Vaccine PF IM 10/18/2009    Influenza Vaccine, unspecified formulation 10/12/2017    Influenza Virus Vaccine 10/09/2009, 09/01/2011, 09/28/2011, 10/12/2017, 10/16/2018, 10/09/2019, 09/16/2020    Influenza, FLUAD, (age 72 y+), Adjuvanted, 0.5mL 09/13/2022    Influenza, FLUARIX, FLULAVAL, FLUZONE (age 10 mo+) AND AFLURIA, (age 1 y+), PF, 0.5mL 10/16/2018, 10/09/2019    Influenza, FLUZONE (age 72 y+), High Dose, 0.7mL 09/16/2020, 10/13/2021    Pneumococcal,

## 2023-11-06 ENCOUNTER — TREATMENT (OUTPATIENT)
Dept: PHYSICAL THERAPY | Facility: CLINIC | Age: 68
End: 2023-11-06
Payer: MEDICARE

## 2023-11-06 DIAGNOSIS — M25.511 CHRONIC RIGHT SHOULDER PAIN: ICD-10-CM

## 2023-11-06 DIAGNOSIS — R29.898 SHOULDER WEAKNESS: ICD-10-CM

## 2023-11-06 DIAGNOSIS — S46.011D TRAUMATIC COMPLETE TEAR OF RIGHT ROTATOR CUFF, SUBSEQUENT ENCOUNTER: Primary | ICD-10-CM

## 2023-11-06 DIAGNOSIS — G89.29 CHRONIC RIGHT SHOULDER PAIN: ICD-10-CM

## 2023-11-06 PROCEDURE — 97110 THERAPEUTIC EXERCISES: CPT | Performed by: PHYSICAL THERAPIST

## 2023-11-06 ASSESSMENT — PAIN - FUNCTIONAL ASSESSMENT: PAIN_FUNCTIONAL_ASSESSMENT: 0-10

## 2023-11-06 ASSESSMENT — ENCOUNTER SYMPTOMS
LOSS OF SENSATION IN FEET: 1
OCCASIONAL FEELINGS OF UNSTEADINESS: 0
DEPRESSION: 0

## 2023-11-06 ASSESSMENT — PAIN SCALES - GENERAL: PAINLEVEL_OUTOF10: 0 - NO PAIN

## 2023-11-06 NOTE — PROGRESS NOTES
Physical Therapy    Physical Therapy Treatment    Patient Name: Andra Russell  MRN: [id number]  Today's Date: 11/6/2023  Time Calculation  Start Time: 1015  Stop Time: 1057  Time Calculation (min): 42 min      Assessment:  Her Rt shoulder AROM: flexion and abduction 140 degrees, ER 55 degrees and reaching behind her back to buttock/sacrum and behind her head to C7.  Reaching behind her back is getting better but still labored.  Her Rt shoulder strength 4-4-/5 with flexion, abduction, 3+-4-/5 ER and 4+/5 with IR.  Her overall function is better and having no issues with her basic and instrumental ADL's.         Plan:  Plan to recheck her in 1 week        Current Problem  1. Traumatic complete tear of right rotator cuff, subsequent encounter        2. Shoulder weakness        3. Chronic right shoulder pain            Subjective   Pt states her shoulder is feeling good and no pain this morning.  She states she still has some difficulty reaching behind her back but this is getting better.      17 out of 18 PT treatments     Precautions  Precautions  STEADI Fall Risk Score (The score of 4 or more indicates an increased risk of falling): 1       Pain  Pain Assessment: 0-10  Pain Score: 0 - No pain  Pain Type: Chronic pain  Pain Location: Shoulder  Pain Orientation: Left    Objective      Rt shoulder AROM: flexion and abduction 140, ER 55.  Reach behind her back to buttock/sacrum and behind her head to C7.      4-4-/5 with flexion and abduction  4+/5 IR  3+-4-/5 ER strength         Treatments:    UBE, F/R, L3, 3 minutes each way  Finger ladder, 10 reps  Wall slides, flexion and scaption, 1 lb, 30 reps  Wall alphabet, 1 lb, 1 rep  IR stretch with Lt UE, 10 sec hold, 10 reps *  Posterior capsule stretch, 10 sec hold, 10 reps *  Sitting and supine ER stretch, 10 sec hold, 10 reps *  Standing wand ex's , 2 lbs, 30 reps (5) *  Side lying shoulder 4-way, 10 reps x 2 *  Prone rows and shoulder extension with scapular retraction,  T's (2) and Y's, 20-30 reps *  Rows, shoulder extension with scapular retractions, ER with scapular retraction and horizontal abduction with RTB, 10-30 reps *  Marleen's, 30 reps       Goals:   1:  No Rt shoulder pain.  2:  Rt shoulder AROM WFL's and pain free.  3:  4+-5/5 gross Rt shoulder strength.  4:  No difficulty with her basic and instrumental ADL's.  5:  Independent with HEP.

## 2023-11-08 ENCOUNTER — HOSPITAL ENCOUNTER (OUTPATIENT)
Dept: LAB | Age: 68
Discharge: HOME | End: 2023-11-08
Payer: MEDICARE

## 2023-11-08 DIAGNOSIS — E11.65 TYPE 2 DIABETES MELLITUS WITH HYPERGLYCEMIA, WITHOUT LONG-TERM CURRENT USE OF INSULIN (HCC): ICD-10-CM

## 2023-11-08 DIAGNOSIS — I10 ESSENTIAL HYPERTENSION: ICD-10-CM

## 2023-11-08 LAB
ALBUMIN SERPL-MCNC: 4 G/DL (ref 3.5–4.6)
ALP SERPL-CCNC: 74 U/L (ref 40–130)
ALT SERPL-CCNC: 15 U/L (ref 0–33)
ANION GAP SERPL CALCULATED.3IONS-SCNC: 13 MEQ/L (ref 9–15)
AST SERPL-CCNC: 21 U/L (ref 0–35)
BASOPHILS # BLD: 0 K/UL (ref 0–0.2)
BASOPHILS NFR BLD: 0.5 %
BILIRUB SERPL-MCNC: 0.5 MG/DL (ref 0.2–0.7)
BUN SERPL-MCNC: 14 MG/DL (ref 8–23)
CALCIUM SERPL-MCNC: 9.3 MG/DL (ref 8.5–9.9)
CHLORIDE SERPL-SCNC: 102 MEQ/L (ref 95–107)
CO2 SERPL-SCNC: 29 MEQ/L (ref 20–31)
CREAT SERPL-MCNC: 0.69 MG/DL (ref 0.5–0.9)
EOSINOPHIL # BLD: 0.2 K/UL (ref 0–0.7)
EOSINOPHIL NFR BLD: 3.4 %
ERYTHROCYTE [DISTWIDTH] IN BLOOD BY AUTOMATED COUNT: 13.8 % (ref 11.5–14.5)
GLOBULIN SER CALC-MCNC: 2.5 G/DL (ref 2.3–3.5)
GLUCOSE SERPL-MCNC: 103 MG/DL (ref 70–99)
HCT VFR BLD AUTO: 42 % (ref 37–47)
HGB BLD-MCNC: 13.6 G/DL (ref 12–16)
LYMPHOCYTES # BLD: 1.8 K/UL (ref 1–4.8)
LYMPHOCYTES NFR BLD: 27.9 %
MCH RBC QN AUTO: 29.6 PG (ref 27–31.3)
MCHC RBC AUTO-ENTMCNC: 32.4 % (ref 33–37)
MCV RBC AUTO: 91.3 FL (ref 79.4–94.8)
MONOCYTES # BLD: 0.5 K/UL (ref 0.2–0.8)
MONOCYTES NFR BLD: 7.8 %
NEUTROPHILS # BLD: 4 K/UL (ref 1.4–6.5)
NEUTS SEG NFR BLD: 60.1 %
PLATELET # BLD AUTO: 319 K/UL (ref 130–400)
POTASSIUM SERPL-SCNC: 3.5 MEQ/L (ref 3.4–4.9)
PROT SERPL-MCNC: 6.5 G/DL (ref 6.3–8)
RBC # BLD AUTO: 4.6 M/UL (ref 4.2–5.4)
SODIUM SERPL-SCNC: 144 MEQ/L (ref 135–144)
WBC # BLD AUTO: 6.6 K/UL (ref 4.8–10.8)

## 2023-11-08 PROCEDURE — 80053 COMPREHEN METABOLIC PANEL: CPT

## 2023-11-08 PROCEDURE — 85025 COMPLETE CBC W/AUTO DIFF WBC: CPT

## 2023-11-08 PROCEDURE — 36415 COLL VENOUS BLD VENIPUNCTURE: CPT

## 2023-11-08 RX ORDER — BLOOD-GLUCOSE SENSOR
1 EACH MISCELLANEOUS
Qty: 1 EACH | Refills: 2 | Status: SHIPPED | OUTPATIENT
Start: 2023-11-08 | End: 2023-11-10 | Stop reason: SDUPTHER

## 2023-11-08 NOTE — TELEPHONE ENCOUNTER
Comments:     Last Office Visit (last PCP visit):   2023    Next Visit Date:  Future Appointments   Date Time Provider [address]   11/10/2023 10:30 AM PRASHANT Ramirez CNP [facility]   11/10/2023 12:00 PM [facility] DIET & NUTRITION [facility] [place]   2023  8:30 AM Lor Mendoza MD [facility]       **If hasn't been seen in over a year OR hasn't followed up according to last diabetes/ADHD visit, make appointment for patient before sending refill to provider.     Rx requested:  Requested Prescriptions     Pending Prescriptions Disp Refills    Continuous Blood Gluc Sensor (FREESTYLE ANA MARÍA 3 SENSOR) MISC 1 each 2     Si each by Does not apply route every 14 days

## 2023-11-10 ENCOUNTER — APPOINTMENT (OUTPATIENT)
Dept: PHYSICAL THERAPY | Facility: CLINIC | Age: 68
End: 2023-11-10
Payer: MEDICARE

## 2023-11-10 ENCOUNTER — HOSPITAL ENCOUNTER (OUTPATIENT)
Age: 68
Discharge: HOME | End: 2023-11-10

## 2023-11-10 ENCOUNTER — OFFICE VISIT (OUTPATIENT)
Dept: FAMILY MEDICINE CLINIC | Age: 68
End: 2023-11-10
Payer: MEDICARE

## 2023-11-10 VITALS
HEART RATE: 73 BPM | TEMPERATURE: 97.5 F | OXYGEN SATURATION: 97 % | HEIGHT: 60 IN | BODY MASS INDEX: 38.56 KG/M2 | DIASTOLIC BLOOD PRESSURE: 70 MMHG | SYSTOLIC BLOOD PRESSURE: 136 MMHG | WEIGHT: 196.4 LBS

## 2023-11-10 DIAGNOSIS — Z12.31 BREAST CANCER SCREENING BY MAMMOGRAM: ICD-10-CM

## 2023-11-10 DIAGNOSIS — E78.5 DYSLIPIDEMIA: ICD-10-CM

## 2023-11-10 DIAGNOSIS — I10 ESSENTIAL HYPERTENSION: Primary | ICD-10-CM

## 2023-11-10 DIAGNOSIS — E11.40 TYPE 2 DIABETES MELLITUS WITH DIABETIC NEUROPATHY, UNSPECIFIED WHETHER LONG TERM INSULIN USE (HCC): ICD-10-CM

## 2023-11-10 LAB — HBA1C MFR BLD: 6.3 %

## 2023-11-10 PROCEDURE — G8484 FLU IMMUNIZE NO ADMIN: HCPCS

## 2023-11-10 PROCEDURE — 3044F HG A1C LEVEL LT 7.0%: CPT

## 2023-11-10 PROCEDURE — 1123F ACP DISCUSS/DSCN MKR DOCD: CPT

## 2023-11-10 PROCEDURE — G8417 CALC BMI ABV UP PARAM F/U: HCPCS

## 2023-11-10 PROCEDURE — 1036F TOBACCO NON-USER: CPT

## 2023-11-10 PROCEDURE — G8427 DOCREV CUR MEDS BY ELIG CLIN: HCPCS

## 2023-11-10 PROCEDURE — 3074F SYST BP LT 130 MM HG: CPT

## 2023-11-10 PROCEDURE — 2022F DILAT RTA XM EVC RTNOPTHY: CPT

## 2023-11-10 PROCEDURE — G8399 PT W/DXA RESULTS DOCUMENT: HCPCS

## 2023-11-10 PROCEDURE — 3017F COLORECTAL CA SCREEN DOC REV: CPT

## 2023-11-10 PROCEDURE — 3078F DIAST BP <80 MM HG: CPT

## 2023-11-10 PROCEDURE — 83036 HEMOGLOBIN GLYCOSYLATED A1C: CPT

## 2023-11-10 PROCEDURE — 1090F PRES/ABSN URINE INCON ASSESS: CPT

## 2023-11-10 PROCEDURE — 99214 OFFICE O/P EST MOD 30 MIN: CPT

## 2023-11-10 RX ORDER — BLOOD-GLUCOSE SENSOR
1 EACH MISCELLANEOUS
Qty: 2 EACH | Refills: 2 | Status: SHIPPED | OUTPATIENT
Start: 2023-11-10

## 2023-11-10 RX ORDER — LOSARTAN POTASSIUM 25 MG/1
25 TABLET ORAL DAILY
Qty: 90 TABLET | Refills: 1 | Status: SHIPPED | OUTPATIENT
Start: 2023-11-10

## 2023-11-10 NOTE — PROGRESS NOTES
Karthikeyan Rolon (: 1955) is a 76 y.o. female, Established patient, who presents today for:    Chief Complaint   Patient presents with    Diabetes     Pt is here today for a point of care A1C. Sugars have been in the 130s at home. Follow-up of the following chronic conditions. Diabetes and hypertension reports tolerating Mounjaro well. Reports increasing blood glucose levels before eating breakfast but after she gets up and moves around in the morning. ASSESSMENT/PLAN    1. Essential hypertension  Patient denies headaches, dizziness with position changes, chest pain blood pressures are well controlled on current medical patient will continue. -     Comprehensive Metabolic Panel; Future  -     CBC with Auto Differential; Future  -     losartan (COZAAR) 25 MG tablet; Take 1 tablet by mouth daily, Disp-90 tablet, R-1NEEDS APPT FOR FURTHER REFILLSNormal  2. Type 2 diabetes mellitus with diabetic neuropathy, unspecified whether long term insulin use ([address])  Advised patient have a protein snack at night such as peanut butter or cheese to help prevent glucose levels going too low in the morning and spiking liver metabolism. A1c level significantly improved today at 6.3 we will continue on current medications. -     Hemoglobin A1C; Future  -     POCT glycosylated hemoglobin (Hb A1C)  -     metFORMIN (GLUCOPHAGE) 1000 MG tablet; TAKE 1 TABLET TWICE A DAY WITH MEALS (NEED APPOINTMENT FOR FURTHER REFILLS), Disp-180 tablet, R-1Normal  -     Continuous Blood Gluc Sensor (FREESTYLE ANA MARÍA 3 SENSOR) MISC; 1 each by Does not apply route every 14 days, Disp-2 each, R-2Normal  3. Dyslipidemia  -     Lipid Panel; Future    4. Breast cancer screening by mammogram  -     [facility] DIGITAL SCREEN W OR WO CAD BILATERAL; Future      Return in about 3 months (around 2/10/2024) for PCP.        SUBJECTIVE/OBJECTIVE:            Allergies   Allergen Reactions    Salsalate Other (See Comments)     causes pt to lose hearing

## 2023-11-10 NOTE — PROGRESS NOTES
AND ADENOIDECTOMY  1963       Labs:    Chemistry CBC/Coags Misc. Recent Labs     11/08/23  0715      K 3.5      CO2 29   BUN 14   CREATININE 0.69   GLUCOSE 103*     No results for input(s): \"PHOS\" in the last 72 hours. Recent Labs     11/08/23  0715   WBC 6.6   RBC 4.60   HGB 13.6   HCT 42.0   MCV 91.3   MCH 29.6   MCHC 32.4*   RDW 13.8       Recent Labs     11/08/23  0715   LABALBU 4.0     Lab Results   Component Value Date/Time    LABA1C 6.3 11/10/2023 11:17 AM              Anthropometric Measures:  Height: 60in  Weight: 196lb/ 89.1kg  Ideal Body Weight: 45.4 kg  Ideal Body Weight %: >100%  BMI = 38.6 which is classified as obese class II    Less than 18.5 Underweight  18.5-24.9 Normal Weight  25-29.9 Overweight  30-34.9 Obese Class I  35-39.9 Obese Class II  Greater than or equal to 40 Obese Class III. Wt Readings from Last 20 Encounters:   11/10/23 89.1 kg (196 lb 6.4 oz)   07/18/23 90.7 kg (200 lb)   07/05/23 90.7 kg (200 lb)   07/05/23 90.7 kg (200 lb)   06/21/23 92 kg (202 lb 12.8 oz)   06/08/23 92.6 kg (204 lb 3.2 oz)   05/24/23 94.6 kg (208 lb 8 oz)   04/14/23 95.3 kg (210 lb 3.2 oz)   01/05/23 93.4 kg (206 lb)   12/13/22 91.8 kg (202 lb 6.4 oz)   11/27/22 92.5 kg (204 lb)   11/16/22 94.6 kg (208 lb 9.6 oz)   09/13/22 93.2 kg (205 lb 6.4 oz)   08/17/22 92.2 kg (203 lb 3.2 oz)   08/15/22 92.5 kg (204 lb)   07/31/22 92.7 kg (204 lb 6.4 oz)   07/27/22 94.8 kg (209 lb)   06/30/22 95.8 kg (211 lb 3.2 oz)   06/08/22 97.5 kg (215 lb)   06/02/22 98.1 kg (216 lb 3.2 oz)       Assessment and Plan:I instructed the pt on basic diabetic diet guidelines. We discussed carb counting, foods that contained carbs, and how to incorporate the correct portions at each meal. I reviewed the importance of not skipping meals. Emphasis was placed on having a set meal schedule, consistent carb intake, and getting regular physical activity.  We reviewed the importance of carb intake and the role the macronutrient

## 2023-11-13 ENCOUNTER — TREATMENT (OUTPATIENT)
Dept: PHYSICAL THERAPY | Facility: CLINIC | Age: 68
End: 2023-11-13
Payer: MEDICARE

## 2023-11-13 DIAGNOSIS — S46.011D TRAUMATIC COMPLETE TEAR OF RIGHT ROTATOR CUFF, SUBSEQUENT ENCOUNTER: Primary | ICD-10-CM

## 2023-11-13 DIAGNOSIS — G89.29 CHRONIC RIGHT SHOULDER PAIN: ICD-10-CM

## 2023-11-13 DIAGNOSIS — M25.511 CHRONIC RIGHT SHOULDER PAIN: ICD-10-CM

## 2023-11-13 DIAGNOSIS — R29.898 SHOULDER WEAKNESS: ICD-10-CM

## 2023-11-13 PROCEDURE — 97110 THERAPEUTIC EXERCISES: CPT | Performed by: PHYSICAL THERAPIST

## 2023-11-13 ASSESSMENT — ENCOUNTER SYMPTOMS
COLOR CHANGE: 0
SHORTNESS OF BREATH: 0
OCCASIONAL FEELINGS OF UNSTEADINESS: 0
LOSS OF SENSATION IN FEET: 1
CHEST TIGHTNESS: 0
NAUSEA: 0
DIARRHEA: 0
DEPRESSION: 0

## 2023-11-13 ASSESSMENT — PAIN - FUNCTIONAL ASSESSMENT: PAIN_FUNCTIONAL_ASSESSMENT: 0-10

## 2023-11-13 ASSESSMENT — PAIN SCALES - GENERAL: PAINLEVEL_OUTOF10: 0 - NO PAIN

## 2023-11-13 NOTE — PROGRESS NOTES
Physical Therapy    Physical Therapy Treatment    Patient Name: Andra Russell  MRN: [id number]  Today's Date: 11/13/2023  Time Calculation  Start Time: 1015  Stop Time: 1055  Time Calculation (min): 40 min      Assessment:  This 69 yo pleasant female has been seen for 18 PT treatments.  Her Rt shoulder is doing much better and currently has no pain.  Her Rt shoulder is WFL's except some difficulty reaching behind her back to buttock.  Her strength and endurance have improved.  Her ROM and strength and endurance will continue to improve over a longer period of time.  She's having no difficulty with her basic and instrumental ADL's and she's independent with her HEP.       Plan:  Discharge     Current Problem  1. Traumatic complete tear of right rotator cuff, subsequent encounter        2. Shoulder weakness        3. Chronic right shoulder pain            Subjective   Pt reports her Rt shoulder is doing well but if seh does too much her bicep still hurts.      18 out of 18 PT treatment     Precautions  Precautions  STEADI Fall Risk Score (The score of 4 or more indicates an increased risk of falling): 1       Pain  Pain Assessment: 0-10  Pain Score: 0 - No pain  Pain Type: Chronic pain  Pain Location: Shoulder  Pain Orientation: Left    Objective     Rt shoulder AROM: flexion and abduction 140, ER 55.  Reach behind her back to buttock/sacrum and behind her head to C7.       4-4-/5 with flexion and abduction  4+/5 IR  3+-4-/5 ER strength      Outcome Measures:  Other Measures  Disability of Arm Shoulder Hand (DASH): 11.36%    Treatments:    UBE, F/R, L3, 3 minutes each way  Finger ladder, 10 reps  Wall slides, flexion and scaption, 1 lb, 30 reps  Wall alphabet, 1 lb, 1 rep  IR stretch with Lt UE, 10 sec hold, 10 reps *  Posterior capsule stretch, 10 sec hold, 10 reps *  Sitting and supine ER stretch, 10 sec hold, 10 reps *  Standing wand ex's , 2 lbs, 30 reps (5) *  Side lying shoulder 4-way, 10 reps x 2 *  Prone rows  and shoulder extension with scapular retraction, T's (2) and Y's, 20-30 reps *  Rows, shoulder extension with scapular retractions, GTB, ER with scapular retraction and horizontal abduction with RTB, 10-30 reps *  Marleen's, 1 lb, 20 reps *  IR with small roll under her arm, GTB, 30 reps *  ER with small roll under her arm, RTB, 30 reps *       Goals:     1:  No Rt shoulder pain.          Met  2:  Rt shoulder AROM WFL's and pain free.   Partially Met  3:  4+-5/5 gross Rt shoulder strength.   Partially Met  4:  No difficulty with her basic and instrumental ADL's.   Met  5:  Independent with HEP.   Met

## 2023-11-17 ENCOUNTER — OFFICE VISIT (OUTPATIENT)
Dept: ORTHOPEDIC SURGERY | Facility: CLINIC | Age: 68
End: 2023-11-17
Payer: MEDICARE

## 2023-11-17 DIAGNOSIS — S46.011D TRAUMATIC TEAR OF RIGHT ROTATOR CUFF, UNSPECIFIED TEAR EXTENT, SUBSEQUENT ENCOUNTER: Primary | ICD-10-CM

## 2023-11-17 PROCEDURE — 99213 OFFICE O/P EST LOW 20 MIN: CPT | Performed by: ORTHOPAEDIC SURGERY

## 2023-11-17 PROCEDURE — 4010F ACE/ARB THERAPY RXD/TAKEN: CPT | Performed by: ORTHOPAEDIC SURGERY

## 2023-11-17 PROCEDURE — 1159F MED LIST DOCD IN RCRD: CPT | Performed by: ORTHOPAEDIC SURGERY

## 2023-11-17 PROCEDURE — 1126F AMNT PAIN NOTED NONE PRSNT: CPT | Performed by: ORTHOPAEDIC SURGERY

## 2023-11-17 NOTE — PROGRESS NOTES
History: Andra is here for her right shoulder.  She is now over 4 months from an arthroscopic rotator cuff repair of a large tear.  She is made good progress in therapy.  She did have a fall off a ladder several days ago but feels her shoulder is okay.  She also is a history of left knee arthritis.    Past medical history: Multiple  Medications: Multiple  Allergies: No known drug allergies    Please refer to the intake H&P regarding the patient's review of systems, family history and social history as was done today    HEENT: Normal  Lungs: Clear to auscultation  Heart: RRR  Abdomen: Soft, nontender  Skin: clear  Extremity: She get the right arm fully overhead.  She has 5 out of 5 abduction and supraspinatus strength.  She has 5 out of 5 rotational strength.  She still lacks 3 levels of internal rotation.  No pain with impingement overs.  Wounds are clear.  She has 1+ crepitus in the left knee with range of motion and some pain around the joint line.  Contralateral exam is normal for strength, motion, stability and neurovascular assessment.    Radiographs: Prior shoulder x-rays were negative.  Old knee x-rays show some degenerative change.    Assessment: Stable right rotator cuff repair 4-1/2 months out, left knee DJD    Plan: Fortunately she did not injure her right shoulder with her recent fall.  She is doing well with her Thera-bands thickening and is making good progress.  She understands will be a few more months for full strengthening and function.  Otherwise she is happy with her progress.  If she would like to pursue options for the left knee we would be happy to see her back for viscosupplementation injections or other modalities.  Cortisone affects her sugars too much.  We could also obtain new standing x-rays at follow-up.  All questions were answered today with the patient.    This note was generated with voice recognition software and may contain grammatical errors.

## 2023-12-12 DIAGNOSIS — E11.40 TYPE 2 DIABETES MELLITUS WITH DIABETIC NEUROPATHY, UNSPECIFIED WHETHER LONG TERM INSULIN USE (HCC): ICD-10-CM

## 2023-12-13 RX ORDER — DULAGLUTIDE 1.5 MG/.5ML
1.5 INJECTION, SOLUTION SUBCUTANEOUS WEEKLY
Qty: 2 ML | Refills: 2 | Status: SHIPPED | OUTPATIENT
Start: 2023-12-13

## 2023-12-13 NOTE — TELEPHONE ENCOUNTER
Comments:     Last Office Visit (last PCP visit):   11/10/2023    Next Visit Date:  Future Appointments   Date Time Provider [address]   12/20/2023  8:30 AM Lisa Miranda MD [address] [facility]   2/9/2024  8:00 AM PRASHANT Shanks - CNP [facility]   4/1/2024  8:00 AM [place] [facility] ROOM 1 [facility]       **If hasn't been seen in over a year OR hasn't followed up according to last diabetes/ADHD visit, make appointment for patient before sending refill to provider.     Rx requested:  Requested Prescriptions     Pending Prescriptions Disp Refills    TRULICITY 1.5 QD/2.5NX SC injection [Pharmacy Med Name: Trulicity 1.5 NX/5.9 mL subcutaneous pen injector] 2 mL 2     Sig: Inject 0.5 mLs into the skin once a week

## 2023-12-16 DIAGNOSIS — E78.5 DYSLIPIDEMIA: ICD-10-CM

## 2023-12-16 DIAGNOSIS — R00.0 TACHYCARDIA: ICD-10-CM

## 2023-12-17 RX ORDER — ATORVASTATIN CALCIUM 20 MG/1
20 TABLET, FILM COATED ORAL NIGHTLY
Qty: 90 TABLET | Refills: 3 | Status: SHIPPED | OUTPATIENT
Start: 2023-12-17

## 2023-12-17 RX ORDER — DILTIAZEM HYDROCHLORIDE 120 MG/1
120 CAPSULE, COATED, EXTENDED RELEASE ORAL DAILY
Qty: 90 CAPSULE | Refills: 3 | Status: SHIPPED | OUTPATIENT
Start: 2023-12-17

## 2023-12-17 RX ORDER — CHLORTHALIDONE 25 MG/1
25 TABLET ORAL DAILY
Qty: 90 TABLET | Refills: 3 | Status: SHIPPED | OUTPATIENT
Start: 2023-12-17

## 2023-12-17 RX ORDER — METOPROLOL SUCCINATE 100 MG/1
150 TABLET, EXTENDED RELEASE ORAL DAILY
Qty: 180 TABLET | Refills: 2 | Status: SHIPPED | OUTPATIENT
Start: 2023-12-17

## 2024-01-05 DIAGNOSIS — E11.40 TYPE 2 DIABETES MELLITUS WITH DIABETIC NEUROPATHY, UNSPECIFIED WHETHER LONG TERM INSULIN USE (HCC): ICD-10-CM

## 2024-01-07 NOTE — TELEPHONE ENCOUNTER
Comments:     Last Office Visit (last PCP visit):   11/10/2023    Next Visit Date:  Future Appointments   Date Time Provider Department Center   2024  8:00 AM Alen Phillips, APRN - CNP [facility]   2024  8:00 AM [facility] 1 [facility]   2024  8:15 AM [place], [place], MD [facility]       **If hasn't been seen in over a year OR hasn't followed up according to last diabetes/ADHD visit, make appointment for patient before sending refill to provider.    Rx requested:  Requested Prescriptions     Pending Prescriptions Disp Refills    dulaglutide (TRULICITY) 1.5 MG/0.5ML SC injection 2 mL 2     Si.5 mLs Once a week at 5 PM

## 2024-01-10 RX ORDER — DULAGLUTIDE 1.5 MG/.5ML
1.5 INJECTION, SOLUTION SUBCUTANEOUS WEEKLY
Qty: 2 ML | Refills: 2 | Status: SHIPPED | OUTPATIENT
Start: 2024-01-10

## 2024-02-01 ENCOUNTER — OFFICE VISIT (OUTPATIENT)
Dept: FAMILY MEDICINE CLINIC | Age: 69
End: 2024-02-01
Payer: MEDICARE

## 2024-02-01 VITALS
DIASTOLIC BLOOD PRESSURE: 60 MMHG | OXYGEN SATURATION: 98 % | TEMPERATURE: 98.8 F | HEART RATE: 67 BPM | SYSTOLIC BLOOD PRESSURE: 132 MMHG

## 2024-02-01 DIAGNOSIS — E66.01 SEVERE OBESITY (BMI 35.0-39.9) WITH COMORBIDITY (HCC): ICD-10-CM

## 2024-02-01 DIAGNOSIS — J02.9 ACUTE PHARYNGITIS, UNSPECIFIED ETIOLOGY: Primary | ICD-10-CM

## 2024-02-01 DIAGNOSIS — J02.9 SORE THROAT: ICD-10-CM

## 2024-02-01 LAB — S PYO AG THROAT QL: NORMAL

## 2024-02-01 PROCEDURE — 3075F SYST BP GE 130 - 139MM HG: CPT | Performed by: INTERNAL MEDICINE

## 2024-02-01 PROCEDURE — 1123F ACP DISCUSS/DSCN MKR DOCD: CPT | Performed by: INTERNAL MEDICINE

## 2024-02-01 PROCEDURE — G8399 PT W/DXA RESULTS DOCUMENT: HCPCS | Performed by: INTERNAL MEDICINE

## 2024-02-01 PROCEDURE — G8427 DOCREV CUR MEDS BY ELIG CLIN: HCPCS | Performed by: INTERNAL MEDICINE

## 2024-02-01 PROCEDURE — 1036F TOBACCO NON-USER: CPT | Performed by: INTERNAL MEDICINE

## 2024-02-01 PROCEDURE — 99214 OFFICE O/P EST MOD 30 MIN: CPT | Performed by: INTERNAL MEDICINE

## 2024-02-01 PROCEDURE — 3078F DIAST BP <80 MM HG: CPT | Performed by: INTERNAL MEDICINE

## 2024-02-01 PROCEDURE — G8417 CALC BMI ABV UP PARAM F/U: HCPCS | Performed by: INTERNAL MEDICINE

## 2024-02-01 PROCEDURE — 3017F COLORECTAL CA SCREEN DOC REV: CPT | Performed by: INTERNAL MEDICINE

## 2024-02-01 PROCEDURE — G8484 FLU IMMUNIZE NO ADMIN: HCPCS | Performed by: INTERNAL MEDICINE

## 2024-02-01 PROCEDURE — 1090F PRES/ABSN URINE INCON ASSESS: CPT | Performed by: INTERNAL MEDICINE

## 2024-02-01 PROCEDURE — 87880 STREP A ASSAY W/OPTIC: CPT | Performed by: INTERNAL MEDICINE

## 2024-02-01 RX ORDER — AZITHROMYCIN 250 MG/1
250 TABLET, FILM COATED ORAL SEE ADMIN INSTRUCTIONS
Qty: 6 TABLET | Refills: 0 | Status: SHIPPED | OUTPATIENT
Start: 2024-02-01 | End: 2024-02-06

## 2024-02-01 ASSESSMENT — ENCOUNTER SYMPTOMS
COUGH: 0
DIARRHEA: 0
ABDOMINAL PAIN: 0
SHORTNESS OF BREATH: 0
BLOOD IN STOOL: 0
VOICE CHANGE: 0
CHEST TIGHTNESS: 0
CONSTIPATION: 0
NAUSEA: 0
SINUS PAIN: 0
WHEEZING: 0

## 2024-02-01 NOTE — PROGRESS NOTES
Content: Thought content normal.         Judgment: Judgment normal.       Allergies   Allergen Reactions    Salsalate Other (See Comments)     causes pt to lose hearing    Molds & Smuts      Causes sneezing and asthma    Percocet [Oxycodone-Acetaminophen] Nausea And Vomiting       Current Outpatient Medications:     azithromycin (ZITHROMAX) 250 MG tablet, Take 1 tablet by mouth See Admin Instructions for 5 days 500mg on day 1 followed by 250mg on days 2 - 5, Disp: 6 tablet, Rfl: 0    dulaglutide (TRULICITY) 1.5 MG/0.5ML SC injection, Inject 0.5 mLs into the skin Once a week at 5 PM, Disp: 2 mL, Rfl: 2    metoprolol succinate (TOPROL XL) 100 MG extended release tablet, Take 1.5 tablets by mouth daily, Disp: 180 tablet, Rfl: 5    dilTIAZem (CARDIZEM CD) 120 MG extended release capsule, Take 1 capsule by mouth daily, Disp: 90 capsule, Rfl: 5    chlorthalidone (HYGROTON) 25 MG tablet, Take 1 tablet by mouth daily, Disp: 90 tablet, Rfl: 3    atorvastatin (LIPITOR) 20 MG tablet, Take 1 tablet by mouth nightly, Disp: 90 tablet, Rfl: 3    losartan (COZAAR) 25 MG tablet, Take 1 tablet by mouth daily, Disp: 90 tablet, Rfl: 1    metFORMIN (GLUCOPHAGE) 1000 MG tablet, TAKE 1 TABLET TWICE A DAY WITH MEALS (NEED APPOINTMENT FOR FURTHER REFILLS), Disp: 180 tablet, Rfl: 1    Continuous Blood Gluc Sensor (FREESTYLE ANA MARÍA 3 SENSOR) MISC, 1 each by Does not apply route every 14 days, Disp: 2 each, Rfl: 2    Potassium Gluconate 595 (99 K) MG TABS, Take by mouth, Disp: , Rfl:     glimepiride (AMARYL) 2 MG tablet, TAKE 1 TABLET BY MOUTH  EVERY MORNING BEFORE  BREAKFAST AND EVERY EVENING BEFORE DINNER. (Patient taking differently: Take 1 tablet by mouth 2 times daily TAKE 1 TABLET BY MOUTH  EVERY MORNING BEFORE  BREAKFAST AND EVERY EVENING BEFORE DINNER. 2nd one is at bedtime), Disp: 180 tablet, Rfl: 2    montelukast (SINGULAIR) 10 MG tablet, Take 1 tablet by mouth nightly, Disp: 90 tablet, Rfl: 2    VENTOLIN  (90 Base) MCG/ACT

## 2024-02-07 ENCOUNTER — HOSPITAL ENCOUNTER (OUTPATIENT)
Dept: LAB | Age: 69
Discharge: HOME | End: 2024-02-07
Payer: MEDICARE

## 2024-02-07 DIAGNOSIS — I10 ESSENTIAL HYPERTENSION: ICD-10-CM

## 2024-02-07 DIAGNOSIS — E11.40 TYPE 2 DIABETES MELLITUS WITH DIABETIC NEUROPATHY, UNSPECIFIED WHETHER LONG TERM INSULIN USE (HCC): ICD-10-CM

## 2024-02-07 DIAGNOSIS — E78.5 DYSLIPIDEMIA: ICD-10-CM

## 2024-02-07 LAB
ALBUMIN SERPL-MCNC: 3.9 G/DL (ref 3.5–4.6)
ALP SERPL-CCNC: 80 U/L (ref 40–130)
ALT SERPL-CCNC: 15 U/L (ref 0–33)
ANION GAP SERPL CALCULATED.3IONS-SCNC: 13 MEQ/L (ref 9–15)
AST SERPL-CCNC: 20 U/L (ref 0–35)
BASOPHILS # BLD: 0 K/UL (ref 0–0.2)
BASOPHILS NFR BLD: 0.4 %
BILIRUB SERPL-MCNC: 0.5 MG/DL (ref 0.2–0.7)
BUN SERPL-MCNC: 18 MG/DL (ref 8–23)
CALCIUM SERPL-MCNC: 9.1 MG/DL (ref 8.5–9.9)
CHLORIDE SERPL-SCNC: 101 MEQ/L (ref 95–107)
CHOLEST SERPL-MCNC: 92 MG/DL (ref 0–199)
CO2 SERPL-SCNC: 28 MEQ/L (ref 20–31)
CREAT SERPL-MCNC: 0.55 MG/DL (ref 0.5–0.9)
EOSINOPHIL # BLD: 0.3 K/UL (ref 0–0.7)
EOSINOPHIL NFR BLD: 4 %
ERYTHROCYTE [DISTWIDTH] IN BLOOD BY AUTOMATED COUNT: 14 % (ref 11.5–14.5)
GLOBULIN SER CALC-MCNC: 2.9 G/DL (ref 2.3–3.5)
GLUCOSE SERPL-MCNC: 118 MG/DL (ref 70–99)
HBA1C MFR BLD: 6.2 % (ref 4.8–5.9)
HCT VFR BLD AUTO: 42.9 % (ref 37–47)
HDLC SERPL-MCNC: 35 MG/DL (ref 40–59)
HGB BLD-MCNC: 13.7 G/DL (ref 12–16)
LDLC SERPL CALC-MCNC: 33 MG/DL (ref 0–129)
LYMPHOCYTES # BLD: 2.4 K/UL (ref 1–4.8)
LYMPHOCYTES NFR BLD: 29.9 %
MCH RBC QN AUTO: 29.1 PG (ref 27–31.3)
MCHC RBC AUTO-ENTMCNC: 31.9 % (ref 33–37)
MCV RBC AUTO: 91.3 FL (ref 79.4–94.8)
MONOCYTES # BLD: 0.6 K/UL (ref 0.2–0.8)
MONOCYTES NFR BLD: 7.9 %
NEUTROPHILS # BLD: 4.6 K/UL (ref 1.4–6.5)
NEUTS SEG NFR BLD: 57.7 %
PLATELET # BLD AUTO: 310 K/UL (ref 130–400)
POTASSIUM SERPL-SCNC: 3.6 MEQ/L (ref 3.4–4.9)
PROT SERPL-MCNC: 6.8 G/DL (ref 6.3–8)
RBC # BLD AUTO: 4.7 M/UL (ref 4.2–5.4)
SODIUM SERPL-SCNC: 142 MEQ/L (ref 135–144)
TRIGL SERPL-MCNC: 122 MG/DL (ref 0–150)
WBC # BLD AUTO: 7.9 K/UL (ref 4.8–10.8)

## 2024-02-07 PROCEDURE — 85025 COMPLETE CBC W/AUTO DIFF WBC: CPT

## 2024-02-07 PROCEDURE — 83036 HEMOGLOBIN GLYCOSYLATED A1C: CPT

## 2024-02-07 PROCEDURE — 80061 LIPID PANEL: CPT

## 2024-02-07 PROCEDURE — 80053 COMPREHEN METABOLIC PANEL: CPT

## 2024-02-07 PROCEDURE — 36415 COLL VENOUS BLD VENIPUNCTURE: CPT

## 2024-02-09 ENCOUNTER — OFFICE VISIT (OUTPATIENT)
Dept: FAMILY MEDICINE CLINIC | Age: 69
End: 2024-02-09
Payer: MEDICARE

## 2024-02-09 VITALS
SYSTOLIC BLOOD PRESSURE: 132 MMHG | HEART RATE: 83 BPM | TEMPERATURE: 97.9 F | OXYGEN SATURATION: 98 % | DIASTOLIC BLOOD PRESSURE: 76 MMHG

## 2024-02-09 DIAGNOSIS — E78.5 DYSLIPIDEMIA: ICD-10-CM

## 2024-02-09 DIAGNOSIS — I73.9 CLAUDICATION (HCC): ICD-10-CM

## 2024-02-09 DIAGNOSIS — R20.2 TINGLING IN EXTREMITIES: ICD-10-CM

## 2024-02-09 DIAGNOSIS — R05.1 ACUTE COUGH: Primary | ICD-10-CM

## 2024-02-09 DIAGNOSIS — G62.9 NEUROPATHY: ICD-10-CM

## 2024-02-09 DIAGNOSIS — E11.40 TYPE 2 DIABETES MELLITUS WITH DIABETIC NEUROPATHY, UNSPECIFIED WHETHER LONG TERM INSULIN USE (HCC): ICD-10-CM

## 2024-02-09 PROCEDURE — 3075F SYST BP GE 130 - 139MM HG: CPT

## 2024-02-09 PROCEDURE — 99214 OFFICE O/P EST MOD 30 MIN: CPT

## 2024-02-09 PROCEDURE — 3078F DIAST BP <80 MM HG: CPT

## 2024-02-09 PROCEDURE — 3017F COLORECTAL CA SCREEN DOC REV: CPT

## 2024-02-09 PROCEDURE — 3044F HG A1C LEVEL LT 7.0%: CPT

## 2024-02-09 PROCEDURE — 1123F ACP DISCUSS/DSCN MKR DOCD: CPT

## 2024-02-09 PROCEDURE — 2022F DILAT RTA XM EVC RTNOPTHY: CPT

## 2024-02-09 PROCEDURE — 1090F PRES/ABSN URINE INCON ASSESS: CPT

## 2024-02-09 PROCEDURE — G8417 CALC BMI ABV UP PARAM F/U: HCPCS

## 2024-02-09 PROCEDURE — G8399 PT W/DXA RESULTS DOCUMENT: HCPCS

## 2024-02-09 PROCEDURE — G8427 DOCREV CUR MEDS BY ELIG CLIN: HCPCS

## 2024-02-09 PROCEDURE — G8484 FLU IMMUNIZE NO ADMIN: HCPCS

## 2024-02-09 PROCEDURE — 1036F TOBACCO NON-USER: CPT

## 2024-02-09 RX ORDER — GABAPENTIN 100 MG/1
200 CAPSULE ORAL NIGHTLY
Qty: 60 CAPSULE | Refills: 0 | Status: SHIPPED | OUTPATIENT
Start: 2024-02-09 | End: 2024-03-10

## 2024-02-09 ASSESSMENT — ENCOUNTER SYMPTOMS
NAUSEA: 0
CHEST TIGHTNESS: 0
COLOR CHANGE: 0
COUGH: 1
DIARRHEA: 0
SHORTNESS OF BREATH: 0

## 2024-02-09 ASSESSMENT — PATIENT HEALTH QUESTIONNAIRE - PHQ9
SUM OF ALL RESPONSES TO PHQ QUESTIONS 1-9: 0
SUM OF ALL RESPONSES TO PHQ9 QUESTIONS 1 & 2: 0
2. FEELING DOWN, DEPRESSED OR HOPELESS: 0
SUM OF ALL RESPONSES TO PHQ QUESTIONS 1-9: 0
1. LITTLE INTEREST OR PLEASURE IN DOING THINGS: 0
SUM OF ALL RESPONSES TO PHQ QUESTIONS 1-9: 0
SUM OF ALL RESPONSES TO PHQ QUESTIONS 1-9: 0

## 2024-02-09 NOTE — PROGRESS NOTES
Musculoskeletal:      Cervical back: Normal range of motion and neck supple.      Right lower leg: No edema.      Left lower leg: No edema.   Lymphadenopathy:      Cervical:      Right cervical: No superficial or posterior cervical adenopathy.     Left cervical: No superficial or posterior cervical adenopathy.      Upper Body:      Right upper body: No supraclavicular adenopathy.      Left upper body: No supraclavicular adenopathy.   Skin:     Capillary Refill: Capillary refill takes less than 2 seconds.   Neurological:      Mental Status: She is alert and oriented to person, place, and time.   Psychiatric:         Mood and Affect: Mood normal.         Behavior: Behavior normal.                       An electronic signature was used to authenticate this note.     Alen Phillips, APRN - CNP

## 2024-02-12 DIAGNOSIS — E11.65 TYPE 2 DIABETES MELLITUS WITH HYPERGLYCEMIA (HCC): ICD-10-CM

## 2024-02-12 DIAGNOSIS — E11.65 TYPE 2 DIABETES MELLITUS WITH HYPERGLYCEMIA, WITHOUT LONG-TERM CURRENT USE OF INSULIN (HCC): ICD-10-CM

## 2024-02-12 NOTE — TELEPHONE ENCOUNTER
Future Appointments    Encounter Information   Provider Department Appt Notes   2/23/2024 Alen Phillips, APRN - CNP [facility] Come back in 2 weeks for Gabipentin follow up 3 months for routine .Return in about 3 months (around 5/9/2024)   4/1/2024 [facility] MAMMO TECH; [facility] 1 [facility] [place]    5/9/2024 Alen Phillips APRN - CNP [facility] Return in about 3 months (around 5/9/2024) .   6/19/2024 [place] Hamilton MD [facility] 6 month follow up     Past Visits    Date Provider Specialty Visit Type Primary Dx   02/09/2024 Alen Phillips APRN - CNP Family Medicine Office Visit Acute cough   02/01/2024 Martina Hernandez MD Family Medicine Office Visit Acute pharyngitis, unspecified etiology   12/20/2023 [place] Hamilton MD Cardiology Office Visit Tachycardia   11/10/2023 Alen Phillips APRN - CNP Family Medicine Office Visit Essential hypertension

## 2024-02-13 RX ORDER — GLIMEPIRIDE 2 MG/1
TABLET ORAL
Qty: 180 TABLET | Refills: 2 | Status: SHIPPED | OUTPATIENT
Start: 2024-02-13

## 2024-02-23 ENCOUNTER — OFFICE VISIT (OUTPATIENT)
Dept: FAMILY MEDICINE CLINIC | Age: 69
End: 2024-02-23
Payer: MEDICARE

## 2024-02-23 VITALS
BODY MASS INDEX: 37.89 KG/M2 | HEART RATE: 80 BPM | WEIGHT: 193 LBS | OXYGEN SATURATION: 98 % | DIASTOLIC BLOOD PRESSURE: 76 MMHG | HEIGHT: 60 IN | SYSTOLIC BLOOD PRESSURE: 130 MMHG

## 2024-02-23 DIAGNOSIS — G62.9 NEUROPATHY: Primary | ICD-10-CM

## 2024-02-23 DIAGNOSIS — E11.40 TYPE 2 DIABETES MELLITUS WITH DIABETIC NEUROPATHY, UNSPECIFIED WHETHER LONG TERM INSULIN USE (HCC): ICD-10-CM

## 2024-02-23 PROBLEM — I73.9 CLAUDICATION (HCC): Status: RESOLVED | Noted: 2023-04-14 | Resolved: 2024-02-23

## 2024-02-23 PROCEDURE — 3017F COLORECTAL CA SCREEN DOC REV: CPT

## 2024-02-23 PROCEDURE — 99213 OFFICE O/P EST LOW 20 MIN: CPT

## 2024-02-23 PROCEDURE — 2022F DILAT RTA XM EVC RTNOPTHY: CPT

## 2024-02-23 PROCEDURE — G8484 FLU IMMUNIZE NO ADMIN: HCPCS

## 2024-02-23 PROCEDURE — 3078F DIAST BP <80 MM HG: CPT

## 2024-02-23 PROCEDURE — G8427 DOCREV CUR MEDS BY ELIG CLIN: HCPCS

## 2024-02-23 PROCEDURE — 3075F SYST BP GE 130 - 139MM HG: CPT

## 2024-02-23 PROCEDURE — G8417 CALC BMI ABV UP PARAM F/U: HCPCS

## 2024-02-23 PROCEDURE — 1123F ACP DISCUSS/DSCN MKR DOCD: CPT

## 2024-02-23 PROCEDURE — 3044F HG A1C LEVEL LT 7.0%: CPT

## 2024-02-23 PROCEDURE — 1090F PRES/ABSN URINE INCON ASSESS: CPT

## 2024-02-23 PROCEDURE — G8399 PT W/DXA RESULTS DOCUMENT: HCPCS

## 2024-02-23 PROCEDURE — 1036F TOBACCO NON-USER: CPT

## 2024-02-23 RX ORDER — GABAPENTIN 100 MG/1
200 CAPSULE ORAL NIGHTLY
Qty: 60 CAPSULE | Refills: 2 | Status: SHIPPED | OUTPATIENT
Start: 2024-02-23 | End: 2024-05-23

## 2024-02-23 RX ORDER — BLOOD-GLUCOSE SENSOR
1 EACH MISCELLANEOUS
Qty: 2 EACH | Refills: 4 | Status: SHIPPED | OUTPATIENT
Start: 2024-02-23

## 2024-02-23 ASSESSMENT — ENCOUNTER SYMPTOMS
ALLERGIC/IMMUNOLOGIC NEGATIVE: 1
RESPIRATORY NEGATIVE: 1
NAUSEA: 0
VOMITING: 0

## 2024-02-23 NOTE — PROGRESS NOTES
2 times a day & as needed for symptoms of irregular blood glucose. Dispense sufficient amount for indicated testing frequency plus additional to accommodate PRN testing needs. One touch meter.    CHLORTHALIDONE (HYGROTON) 25 MG TABLET    Take 1 tablet by mouth daily    COENZYME Q10 (CO Q 10) 100 MG CAPS    Take 1 capsule by mouth daily    DILTIAZEM (CARDIZEM CD) 120 MG EXTENDED RELEASE CAPSULE    Take 1 capsule by mouth daily    DULAGLUTIDE (TRULICITY) 1.5 MG/0.5ML SC INJECTION    Inject 0.5 mLs into the skin Once a week at 5 PM    FEXOFENADINE-PSEUDOEPHEDRINE (ALLEGRA-D 24 HOUR PO)    Take by mouth    GLIMEPIRIDE (AMARYL) 2 MG TABLET    TAKE 1 TABLET BY MOUTH EVERY MORNING BEFORE BREAKFAST AND EVERY EVENING BEFORE DINNER.    LOSARTAN (COZAAR) 25 MG TABLET    Take 1 tablet by mouth daily    METFORMIN (GLUCOPHAGE) 1000 MG TABLET    TAKE 1 TABLET TWICE A DAY WITH MEALS (NEED APPOINTMENT FOR FURTHER REFILLS)    METOPROLOL SUCCINATE (TOPROL XL) 100 MG EXTENDED RELEASE TABLET    Take 1.5 tablets by mouth daily    MONTELUKAST (SINGULAIR) 10 MG TABLET    Take 1 tablet by mouth nightly    MULTIPLE VITAMINS-MINERALS (MULTIVITAMIN & MINERAL PO)    Take by mouth    OMEGA-3 FATTY ACIDS (FISH OIL) 1200 MG CAPS    Take by mouth    POTASSIUM GLUCONATE 595 (99 K) MG TABS    Take by mouth    VENTOLIN  (90 BASE) MCG/ACT INHALER    INHALE 2 PUFFS EVERY 4 HOURS AS NEEDED     ALLERGIES     Patient is is allergic to salsalate, molds & smuts, and percocet [oxycodone-acetaminophen].  FAMILY HISTORY     Patient'sfamily history includes Breast Cancer in her maternal aunt; Diabetes in her brother and father; Hypertension in her brother, father, and mother; Lung Cancer in her mother.  HISTORY     Patient  reports that she has never smoked. She has never been exposed to tobacco smoke. She has never used smokeless tobacco. She reports current alcohol use. She reports that she does not use drugs.  READY CARE COURSE   No orders of the

## 2024-02-29 ENCOUNTER — TELEPHONE (OUTPATIENT)
Dept: FAMILY MEDICINE CLINIC | Age: 69
End: 2024-02-29

## 2024-02-29 NOTE — TELEPHONE ENCOUNTER
Pt calling states pharmacy is out of 1.5 MG of trulicity. Pt states she contacted manufacture, they recommend an rx for .75 MG for pt to inject 2x to equal 1.5 MG.       DDM - [place]

## 2024-03-01 DIAGNOSIS — E11.40 TYPE 2 DIABETES MELLITUS WITH DIABETIC NEUROPATHY, UNSPECIFIED WHETHER LONG TERM INSULIN USE (HCC): ICD-10-CM

## 2024-03-02 PROBLEM — J02.9 SORE THROAT: Status: RESOLVED | Noted: 2024-02-01 | Resolved: 2024-03-02

## 2024-03-26 DIAGNOSIS — J45.41 MODERATE PERSISTENT ASTHMA WITH ACUTE EXACERBATION: ICD-10-CM

## 2024-03-26 RX ORDER — MONTELUKAST SODIUM 10 MG/1
10 TABLET ORAL NIGHTLY
Qty: 90 TABLET | Refills: 2 | OUTPATIENT
Start: 2024-03-26

## 2024-03-26 RX ORDER — MONTELUKAST SODIUM 10 MG/1
10 TABLET ORAL NIGHTLY
Qty: 90 TABLET | Refills: 2 | Status: SHIPPED | OUTPATIENT
Start: 2024-03-26

## 2024-03-26 NOTE — TELEPHONE ENCOUNTER
Comments:     Last Office Visit (last PCP visit):   2/23/2024    Next Visit Date:  Future Appointments   Date Time Provider Department Center   4/1/2024  8:00 AM [facility] RO[facility] 1 [facility]   5/9/2024  8:00 AM Alen Phillips, APRN - CNP [facility]   6/19/2024  8:15 AM [place] Hamilton MD [place] [facility]       **If hasn't been seen in over a year OR hasn't followed up according to last diabetes/ADHD visit, make appointment for patient before sending refill to provider.    Rx requested:  Requested Prescriptions     Pending Prescriptions Disp Refills    montelukast (SINGULAIR) 10 MG tablet 90 tablet 2     Sig: Take 1 tablet by mouth nightly

## 2024-04-01 ENCOUNTER — HOSPITAL ENCOUNTER (OUTPATIENT)
Dept: WOMENS IMAGING | Age: 69
Discharge: HOME | End: 2024-04-03
Payer: MEDICARE

## 2024-04-01 DIAGNOSIS — Z12.31 BREAST CANCER SCREENING BY MAMMOGRAM: ICD-10-CM

## 2024-04-01 PROCEDURE — 77063 BREAST TOMOSYNTHESIS BI: CPT

## 2024-04-04 ENCOUNTER — TELEPHONE (OUTPATIENT)
Dept: FAMILY MEDICINE CLINIC | Age: 69
End: 2024-04-04

## 2024-04-18 DIAGNOSIS — E11.40 TYPE 2 DIABETES MELLITUS WITH DIABETIC NEUROPATHY, UNSPECIFIED WHETHER LONG TERM INSULIN USE (HCC): ICD-10-CM

## 2024-04-19 RX ORDER — DULAGLUTIDE 1.5 MG/.5ML
1.5 INJECTION, SOLUTION SUBCUTANEOUS WEEKLY
Qty: 2 ML | Refills: 1 | OUTPATIENT
Start: 2024-04-19

## 2024-05-07 ENCOUNTER — HOSPITAL ENCOUNTER (OUTPATIENT)
Dept: LAB | Age: 69
Discharge: HOME | End: 2024-05-07
Payer: MEDICARE

## 2024-05-07 DIAGNOSIS — E11.40 TYPE 2 DIABETES MELLITUS WITH DIABETIC NEUROPATHY, UNSPECIFIED WHETHER LONG TERM INSULIN USE (HCC): ICD-10-CM

## 2024-05-07 DIAGNOSIS — I73.9 CLAUDICATION (HCC): ICD-10-CM

## 2024-05-07 DIAGNOSIS — E78.5 DYSLIPIDEMIA: ICD-10-CM

## 2024-05-07 DIAGNOSIS — R20.2 TINGLING IN EXTREMITIES: ICD-10-CM

## 2024-05-07 LAB
ALBUMIN SERPL-MCNC: 4.2 G/DL (ref 3.5–4.6)
ALP SERPL-CCNC: 80 U/L (ref 40–130)
ALT SERPL-CCNC: 17 U/L (ref 0–33)
ANION GAP SERPL CALCULATED.3IONS-SCNC: 12 MEQ/L (ref 9–15)
AST SERPL-CCNC: 21 U/L (ref 0–35)
BASOPHILS # BLD: 0 K/UL (ref 0–0.2)
BASOPHILS NFR BLD: 0.3 %
BILIRUB SERPL-MCNC: 0.6 MG/DL (ref 0.2–0.7)
BUN SERPL-MCNC: 18 MG/DL (ref 8–23)
CALCIUM SERPL-MCNC: 9.2 MG/DL (ref 8.5–9.9)
CHLORIDE SERPL-SCNC: 101 MEQ/L (ref 95–107)
CHOLEST SERPL-MCNC: 96 MG/DL (ref 0–199)
CO2 SERPL-SCNC: 29 MEQ/L (ref 20–31)
CREAT SERPL-MCNC: 0.62 MG/DL (ref 0.5–0.9)
EOSINOPHIL # BLD: 0.2 K/UL (ref 0–0.7)
EOSINOPHIL NFR BLD: 2.9 %
ERYTHROCYTE [DISTWIDTH] IN BLOOD BY AUTOMATED COUNT: 14.1 % (ref 11.5–14.5)
ESTIMATED AVERAGE GLUCOSE: 120 MG/DL
FOLATE: >20 NG/ML (ref 4.8–24.2)
GLOBULIN SER CALC-MCNC: 2.6 G/DL (ref 2.3–3.5)
GLUCOSE SERPL-MCNC: 126 MG/DL (ref 70–99)
HBA1C MFR BLD: 5.8 % (ref 4–6)
HCT VFR BLD AUTO: 42.8 % (ref 37–47)
HDLC SERPL-MCNC: 45 MG/DL (ref 40–59)
HGB BLD-MCNC: 14.1 G/DL (ref 12–16)
LDLC SERPL CALC-MCNC: 38 MG/DL (ref 0–129)
LYMPHOCYTES # BLD: 1.6 K/UL (ref 1–4.8)
LYMPHOCYTES NFR BLD: 24.7 %
MCH RBC QN AUTO: 29.4 PG (ref 27–31.3)
MCHC RBC AUTO-ENTMCNC: 32.9 % (ref 33–37)
MCV RBC AUTO: 89.2 FL (ref 79.4–94.8)
MONOCYTES # BLD: 0.5 K/UL (ref 0.2–0.8)
MONOCYTES NFR BLD: 7.8 %
NEUTROPHILS # BLD: 4.3 K/UL (ref 1.4–6.5)
NEUTS SEG NFR BLD: 64 %
PLATELET # BLD AUTO: 303 K/UL (ref 130–400)
POTASSIUM SERPL-SCNC: 3.4 MEQ/L (ref 3.4–4.9)
PROT SERPL-MCNC: 6.8 G/DL (ref 6.3–8)
RBC # BLD AUTO: 4.8 M/UL (ref 4.2–5.4)
SODIUM SERPL-SCNC: 142 MEQ/L (ref 135–144)
T4 FREE SERPL-MCNC: 1.23 NG/DL (ref 0.84–1.68)
TRIGL SERPL-MCNC: 63 MG/DL (ref 0–150)
TSH SERPL-MCNC: 0.78 UIU/ML (ref 0.44–3.86)
VITAMIN B-12: 1653 PG/ML (ref 232–1245)
WBC # BLD AUTO: 6.7 K/UL (ref 4.8–10.8)

## 2024-05-07 PROCEDURE — 36415 COLL VENOUS BLD VENIPUNCTURE: CPT

## 2024-05-07 PROCEDURE — 84439 ASSAY OF FREE THYROXINE: CPT

## 2024-05-07 PROCEDURE — 82746 ASSAY OF FOLIC ACID SERUM: CPT

## 2024-05-07 PROCEDURE — 85025 COMPLETE CBC W/AUTO DIFF WBC: CPT

## 2024-05-07 PROCEDURE — 80061 LIPID PANEL: CPT

## 2024-05-07 PROCEDURE — 80053 COMPREHEN METABOLIC PANEL: CPT

## 2024-05-07 PROCEDURE — 83036 HEMOGLOBIN GLYCOSYLATED A1C: CPT

## 2024-05-07 PROCEDURE — 84443 ASSAY THYROID STIM HORMONE: CPT

## 2024-05-07 PROCEDURE — 82607 VITAMIN B-12: CPT

## 2024-05-09 ENCOUNTER — OFFICE VISIT (OUTPATIENT)
Dept: FAMILY MEDICINE CLINIC | Age: 69
End: 2024-05-09
Payer: MEDICARE

## 2024-05-09 VITALS
DIASTOLIC BLOOD PRESSURE: 70 MMHG | BODY MASS INDEX: 38.13 KG/M2 | WEIGHT: 194.2 LBS | SYSTOLIC BLOOD PRESSURE: 112 MMHG | HEART RATE: 67 BPM | HEIGHT: 60 IN | OXYGEN SATURATION: 96 %

## 2024-05-09 DIAGNOSIS — I10 ESSENTIAL HYPERTENSION: ICD-10-CM

## 2024-05-09 DIAGNOSIS — G62.9 NEUROPATHY: ICD-10-CM

## 2024-05-09 DIAGNOSIS — E53.8 B12 DEFICIENCY: ICD-10-CM

## 2024-05-09 DIAGNOSIS — E78.5 DYSLIPIDEMIA: ICD-10-CM

## 2024-05-09 DIAGNOSIS — E11.40 TYPE 2 DIABETES MELLITUS WITH DIABETIC NEUROPATHY, UNSPECIFIED WHETHER LONG TERM INSULIN USE (HCC): Primary | ICD-10-CM

## 2024-05-09 PROCEDURE — 3074F SYST BP LT 130 MM HG: CPT

## 2024-05-09 PROCEDURE — 1123F ACP DISCUSS/DSCN MKR DOCD: CPT

## 2024-05-09 PROCEDURE — 3078F DIAST BP <80 MM HG: CPT

## 2024-05-09 PROCEDURE — G8399 PT W/DXA RESULTS DOCUMENT: HCPCS

## 2024-05-09 PROCEDURE — 1036F TOBACCO NON-USER: CPT

## 2024-05-09 PROCEDURE — G8417 CALC BMI ABV UP PARAM F/U: HCPCS

## 2024-05-09 PROCEDURE — G8427 DOCREV CUR MEDS BY ELIG CLIN: HCPCS

## 2024-05-09 PROCEDURE — 3017F COLORECTAL CA SCREEN DOC REV: CPT

## 2024-05-09 PROCEDURE — 2022F DILAT RTA XM EVC RTNOPTHY: CPT

## 2024-05-09 PROCEDURE — 3044F HG A1C LEVEL LT 7.0%: CPT

## 2024-05-09 PROCEDURE — 1090F PRES/ABSN URINE INCON ASSESS: CPT

## 2024-05-09 PROCEDURE — 99214 OFFICE O/P EST MOD 30 MIN: CPT

## 2024-05-09 RX ORDER — DULAGLUTIDE 1.5 MG/.5ML
INJECTION, SOLUTION SUBCUTANEOUS
COMMUNITY
Start: 2024-04-18 | End: 2024-05-09 | Stop reason: SDUPTHER

## 2024-05-09 RX ORDER — GABAPENTIN 100 MG/1
200 CAPSULE ORAL NIGHTLY
Qty: 60 CAPSULE | Refills: 2 | Status: SHIPPED | OUTPATIENT
Start: 2024-05-09 | End: 2024-08-07

## 2024-05-09 RX ORDER — DULAGLUTIDE 1.5 MG/.5ML
1.5 INJECTION, SOLUTION SUBCUTANEOUS WEEKLY
Qty: 2 ADJUSTABLE DOSE PRE-FILLED PEN SYRINGE | Refills: 2 | Status: SHIPPED | OUTPATIENT
Start: 2024-05-09 | End: 2026-01-23

## 2024-05-09 RX ORDER — BLOOD-GLUCOSE SENSOR
1 EACH MISCELLANEOUS
Qty: 2 EACH | Refills: 4 | Status: SHIPPED | OUTPATIENT
Start: 2024-05-09

## 2024-05-09 RX ORDER — LOSARTAN POTASSIUM 25 MG/1
25 TABLET ORAL DAILY
Qty: 90 TABLET | Refills: 1 | Status: SHIPPED | OUTPATIENT
Start: 2024-05-09

## 2024-05-09 RX ORDER — BLOOD-GLUCOSE SENSOR
EACH MISCELLANEOUS
COMMUNITY
Start: 2024-04-09

## 2024-05-09 RX ORDER — GLIMEPIRIDE 2 MG/1
2 TABLET ORAL
Qty: 180 TABLET | Refills: 2 | Status: SHIPPED | OUTPATIENT
Start: 2024-05-09

## 2024-05-09 SDOH — ECONOMIC STABILITY: FOOD INSECURITY: WITHIN THE PAST 12 MONTHS, YOU WORRIED THAT YOUR FOOD WOULD RUN OUT BEFORE YOU GOT MONEY TO BUY MORE.: NEVER TRUE

## 2024-05-09 SDOH — ECONOMIC STABILITY: FOOD INSECURITY: WITHIN THE PAST 12 MONTHS, THE FOOD YOU BOUGHT JUST DIDN'T LAST AND YOU DIDN'T HAVE MONEY TO GET MORE.: NEVER TRUE

## 2024-05-09 SDOH — ECONOMIC STABILITY: HOUSING INSECURITY
IN THE LAST 12 MONTHS, WAS THERE A TIME WHEN YOU DID NOT HAVE A STEADY PLACE TO SLEEP OR SLEPT IN A SHELTER (INCLUDING NOW)?: NO

## 2024-05-09 SDOH — ECONOMIC STABILITY: INCOME INSECURITY: HOW HARD IS IT FOR YOU TO PAY FOR THE VERY BASICS LIKE FOOD, HOUSING, MEDICAL CARE, AND HEATING?: NOT HARD AT ALL

## 2024-05-09 ASSESSMENT — ENCOUNTER SYMPTOMS
RESPIRATORY NEGATIVE: 1
COUGH: 0
GASTROINTESTINAL NEGATIVE: 1
EYES NEGATIVE: 1
ALLERGIC/IMMUNOLOGIC NEGATIVE: 1
SHORTNESS OF BREATH: 0

## 2024-05-09 NOTE — PROGRESS NOTES
And Vomiting        Review of Systems   Constitutional: Negative.  Negative for fatigue and fever.   HENT: Negative.  Negative for congestion.    Eyes: Negative.    Respiratory: Negative.  Negative for cough and shortness of breath.    Cardiovascular: Negative.    Gastrointestinal: Negative.    Endocrine: Negative.    Genitourinary: Negative.    Musculoskeletal: Negative.    Skin: Negative.    Allergic/Immunologic: Negative.    Neurological:  Negative for numbness.   Hematological: Negative.    Psychiatric/Behavioral: Negative.         Current Outpatient Medications on File Prior to Visit   Medication Sig Dispense Refill    Continuous Glucose Sensor (FREESTYLE ANA MARÍA 3 SENSOR) MISC 1 each by Does not apply route every 14 days      montelukast (SINGULAIR) 10 MG tablet Take 1 tablet by mouth nightly 90 tablet 2    metoprolol succinate (TOPROL XL) 100 MG extended release tablet Take 1.5 tablets by mouth daily 180 tablet 5    dilTIAZem (CARDIZEM CD) 120 MG extended release capsule Take 1 capsule by mouth daily 90 capsule 5    chlorthalidone (HYGROTON) 25 MG tablet Take 1 tablet by mouth daily 90 tablet 3    atorvastatin (LIPITOR) 20 MG tablet Take 1 tablet by mouth nightly 90 tablet 3    Potassium Gluconate 595 (99 K) MG TABS Take by mouth      VENTOLIN  (90 Base) MCG/ACT inhaler INHALE 2 PUFFS EVERY 4 HOURS AS NEEDED 1 each 3    Fexofenadine-Pseudoephedrine (ALLEGRA-D 24 HOUR PO) Take by mouth      Coenzyme Q10 (CO Q 10) 100 MG CAPS Take 1 capsule by mouth daily 30 capsule 11    Omega-3 Fatty Acids (FISH OIL) 1200 MG CAPS Take by mouth      Multiple Vitamins-Minerals (MULTIVITAMIN & MINERAL PO) Take by mouth      aspirin 81 MG tablet Take 1 tablet by mouth      blood glucose monitor strips Test 2 times a day & as needed for symptoms of irregular blood glucose. Dispense sufficient amount for indicated testing frequency plus additional to accommodate PRN testing needs. One touch meter. 100 strip 3     No current

## 2024-06-19 ENCOUNTER — OFFICE VISIT (OUTPATIENT)
Dept: CARDIOLOGY CLINIC | Age: 69
End: 2024-06-19
Payer: MEDICARE

## 2024-06-19 VITALS
SYSTOLIC BLOOD PRESSURE: 120 MMHG | OXYGEN SATURATION: 96 % | RESPIRATION RATE: 15 BRPM | WEIGHT: 194.8 LBS | HEART RATE: 81 BPM | DIASTOLIC BLOOD PRESSURE: 74 MMHG | BODY MASS INDEX: 38.24 KG/M2 | HEIGHT: 60 IN

## 2024-06-19 DIAGNOSIS — I10 ESSENTIAL HYPERTENSION: Primary | ICD-10-CM

## 2024-06-19 PROCEDURE — 1090F PRES/ABSN URINE INCON ASSESS: CPT | Performed by: INTERNAL MEDICINE

## 2024-06-19 PROCEDURE — 3078F DIAST BP <80 MM HG: CPT | Performed by: INTERNAL MEDICINE

## 2024-06-19 PROCEDURE — 93000 ELECTROCARDIOGRAM COMPLETE: CPT | Performed by: INTERNAL MEDICINE

## 2024-06-19 PROCEDURE — 3017F COLORECTAL CA SCREEN DOC REV: CPT | Performed by: INTERNAL MEDICINE

## 2024-06-19 PROCEDURE — 1036F TOBACCO NON-USER: CPT | Performed by: INTERNAL MEDICINE

## 2024-06-19 PROCEDURE — G8399 PT W/DXA RESULTS DOCUMENT: HCPCS | Performed by: INTERNAL MEDICINE

## 2024-06-19 PROCEDURE — 3074F SYST BP LT 130 MM HG: CPT | Performed by: INTERNAL MEDICINE

## 2024-06-19 PROCEDURE — G8427 DOCREV CUR MEDS BY ELIG CLIN: HCPCS | Performed by: INTERNAL MEDICINE

## 2024-06-19 PROCEDURE — 1123F ACP DISCUSS/DSCN MKR DOCD: CPT | Performed by: INTERNAL MEDICINE

## 2024-06-19 PROCEDURE — G8417 CALC BMI ABV UP PARAM F/U: HCPCS | Performed by: INTERNAL MEDICINE

## 2024-06-19 PROCEDURE — 99214 OFFICE O/P EST MOD 30 MIN: CPT | Performed by: INTERNAL MEDICINE

## 2024-06-19 ASSESSMENT — ENCOUNTER SYMPTOMS
CONSTIPATION: 0
VOMITING: 0
EYE REDNESS: 0
SHORTNESS OF BREATH: 0
COUGH: 0
NAUSEA: 0
COLOR CHANGE: 0
APNEA: 0
CHEST TIGHTNESS: 0
RHINORRHEA: 0
DIARRHEA: 0
WHEEZING: 0
ABDOMINAL PAIN: 0

## 2024-06-19 NOTE — PROGRESS NOTES
breath and wheezing.    Cardiovascular:  Negative for chest pain, palpitations and leg swelling.   Gastrointestinal:  Negative for abdominal pain, constipation, diarrhea, nausea and vomiting.   Genitourinary:  Negative for difficulty urinating and dysuria.   Musculoskeletal: Negative.  Negative for joint swelling.   Skin:  Negative for color change, rash and wound.   Neurological:  Negative for dizziness, syncope, weakness, numbness and headaches.   Psychiatric/Behavioral: Negative.          VITALS:  Blood pressure 120/74, pulse 81, resp. rate 15, height 1.524 m (5'), weight 88.4 kg (194 lb 12.8 oz), SpO2 96 %, not currently breastfeeding.  Body mass index is 38.04 kg/m².    Physical Examination:  Physical Exam  Vitals and nursing note reviewed.   Constitutional:       Appearance: Normal appearance. She is obese.   HENT:      Head: Normocephalic and atraumatic.      Mouth/Throat:      Mouth: Mucous membranes are moist.      Pharynx: Oropharynx is clear.   Eyes:      Extraocular Movements: Extraocular movements intact.      Conjunctiva/sclera: Conjunctivae normal.      Pupils: Pupils are equal, round, and reactive to light.   Cardiovascular:      Rate and Rhythm: Normal rate and regular rhythm.      Pulses: Normal pulses.      Heart sounds: Normal heart sounds.   Pulmonary:      Effort: Pulmonary effort is normal.      Breath sounds: Normal breath sounds.   Abdominal:      General: Abdomen is flat. Bowel sounds are normal.      Palpations: Abdomen is soft.   Musculoskeletal:         General: Normal range of motion.      Cervical back: Normal range of motion and neck supple.   Skin:     General: Skin is warm.   Neurological:      General: No focal deficit present.      Mental Status: She is alert and oriented to person, place, and time. Mental status is at baseline.   Psychiatric:         Mood and Affect: Mood normal.        EKG: Sinus rhythm    Orders Placed This Encounter   Procedures    EKG 12 lead         The

## 2024-07-05 DIAGNOSIS — E11.40 TYPE 2 DIABETES MELLITUS WITH DIABETIC NEUROPATHY, UNSPECIFIED WHETHER LONG TERM INSULIN USE (HCC): ICD-10-CM

## 2024-07-06 NOTE — TELEPHONE ENCOUNTER
Pt is requesting medication refill. Please approve or deny this request.    Rx requested:  Requested Prescriptions     Pending Prescriptions Disp Refills    Continuous Glucose Sensor (FREESTYLE ANA MARÍA 3 SENSOR) MISC 2 each 4     Si each by Does not apply route every 14 days         Last Office Visit:   2024      Next Visit Date:  Future Appointments   Date Time Provider Department Center   2024  8:00 AM Martina Hernandez MD [facility]   2024  8:00 AM [place] MD [facility]     
[FreeTextEntry1] : Imaging reports reviewed

## 2024-07-08 RX ORDER — BLOOD-GLUCOSE SENSOR
1 EACH MISCELLANEOUS
Qty: 2 EACH | Refills: 4 | Status: SHIPPED | OUTPATIENT
Start: 2024-07-08

## 2024-08-02 DIAGNOSIS — J45.41 MODERATE PERSISTENT ASTHMA WITH ACUTE EXACERBATION: ICD-10-CM

## 2024-08-02 RX ORDER — ALBUTEROL SULFATE 90 UG/1
AEROSOL, METERED RESPIRATORY (INHALATION)
Qty: 1 EACH | Refills: 3 | Status: SHIPPED | OUTPATIENT
Start: 2024-08-02

## 2024-08-02 NOTE — TELEPHONE ENCOUNTER
Comments: Has new patient appt set for 11/12/24    Last Office Visit (last PCP visit):   Visit date not found    Next Visit Date:  Future Appointments   Date Time Provider Department Center   11/12/2024  8:00 AM Martina Hernandez MD [facility]   12/18/2024  8:00 AM [place], [place], [place] [facility] [place] [facility]       **If hasn't been seen in over a year OR hasn't followed up according to last diabetes/ADHD visit, make appointment for patient before sending refill to provider.    Rx requested:  Requested Prescriptions     Pending Prescriptions Disp Refills    VENTOLIN  (90 Base) MCG/ACT inhaler 1 each 3     Sig: INHALE 2 PUFFS EVERY 4 HOURS AS NEEDED

## 2024-08-06 DIAGNOSIS — E11.40 TYPE 2 DIABETES MELLITUS WITH DIABETIC NEUROPATHY, UNSPECIFIED WHETHER LONG TERM INSULIN USE (HCC): ICD-10-CM

## 2024-08-06 RX ORDER — DULAGLUTIDE 1.5 MG/.5ML
1.5 INJECTION, SOLUTION SUBCUTANEOUS WEEKLY
Qty: 2 ADJUSTABLE DOSE PRE-FILLED PEN SYRINGE | Refills: 2 | Status: SHIPPED | OUTPATIENT
Start: 2024-08-06 | End: 2026-04-22

## 2024-08-06 NOTE — TELEPHONE ENCOUNTER
Comments:     Last Office Visit (last PCP visit):   Visit date not found    Next Visit Date:  Future Appointments   Date Time Provider Department Center   11/12/2024  8:00 AM Martina Hernandez MD [facility]   12/18/2024  8:00 AM [place], [place], [facility]       **If hasn't been seen in over a year OR hasn't followed up according to last diabetes/ADHD visit, make appointment for patient before sending refill to provider.    Rx requested:  Requested Prescriptions     Pending Prescriptions Disp Refills    TRULICITY 1.5 MG/0.5ML SC injection 2 Adjustable Dose Pre-filled Pen Syringe 2     Sig: Inject 0.5 mLs into the skin once a week for 90 doses

## 2024-08-22 DIAGNOSIS — G62.9 NEUROPATHY: ICD-10-CM

## 2024-08-22 RX ORDER — GABAPENTIN 100 MG/1
200 CAPSULE ORAL NIGHTLY
Qty: 60 CAPSULE | Refills: 2 | Status: SHIPPED | OUTPATIENT
Start: 2024-08-22 | End: 2024-11-20

## 2024-08-26 ENCOUNTER — OFFICE VISIT (OUTPATIENT)
Dept: FAMILY MEDICINE CLINIC | Age: 69
End: 2024-08-26
Payer: MEDICARE

## 2024-08-26 VITALS
SYSTOLIC BLOOD PRESSURE: 128 MMHG | WEIGHT: 194.4 LBS | HEIGHT: 60 IN | TEMPERATURE: 97 F | BODY MASS INDEX: 38.17 KG/M2 | HEART RATE: 83 BPM | DIASTOLIC BLOOD PRESSURE: 70 MMHG | OXYGEN SATURATION: 96 %

## 2024-08-26 VITALS
DIASTOLIC BLOOD PRESSURE: 70 MMHG | HEIGHT: 60 IN | OXYGEN SATURATION: 96 % | BODY MASS INDEX: 38.17 KG/M2 | SYSTOLIC BLOOD PRESSURE: 128 MMHG | WEIGHT: 194.4 LBS | TEMPERATURE: 97 F | HEART RATE: 83 BPM

## 2024-08-26 DIAGNOSIS — E11.9 TYPE 2 DIABETES MELLITUS WITHOUT COMPLICATION, WITHOUT LONG-TERM CURRENT USE OF INSULIN (HCC): ICD-10-CM

## 2024-08-26 DIAGNOSIS — Z00.00 MEDICARE ANNUAL WELLNESS VISIT, SUBSEQUENT: Primary | ICD-10-CM

## 2024-08-26 DIAGNOSIS — G62.9 NEUROPATHY: ICD-10-CM

## 2024-08-26 DIAGNOSIS — J45.20 MILD INTERMITTENT ASTHMA WITHOUT COMPLICATION: ICD-10-CM

## 2024-08-26 DIAGNOSIS — E66.01 SEVERE OBESITY (BMI 35.0-39.9) WITH COMORBIDITY (HCC): ICD-10-CM

## 2024-08-26 DIAGNOSIS — Z13.6 SCREENING FOR CARDIOVASCULAR CONDITION: ICD-10-CM

## 2024-08-26 DIAGNOSIS — E78.5 HYPERLIPIDEMIA ASSOCIATED WITH TYPE 2 DIABETES MELLITUS (HCC): Primary | ICD-10-CM

## 2024-08-26 DIAGNOSIS — I25.10 CORONARY ARTERY DISEASE DUE TO LIPID RICH PLAQUE: ICD-10-CM

## 2024-08-26 DIAGNOSIS — I15.2 HYPERTENSION ASSOCIATED WITH DIABETES (HCC): ICD-10-CM

## 2024-08-26 DIAGNOSIS — Z71.89 ACP (ADVANCE CARE PLANNING): ICD-10-CM

## 2024-08-26 DIAGNOSIS — I25.83 CORONARY ARTERY DISEASE DUE TO LIPID RICH PLAQUE: ICD-10-CM

## 2024-08-26 DIAGNOSIS — R52 ACUTE PAIN: ICD-10-CM

## 2024-08-26 DIAGNOSIS — E11.69 HYPERLIPIDEMIA ASSOCIATED WITH TYPE 2 DIABETES MELLITUS (HCC): Primary | ICD-10-CM

## 2024-08-26 DIAGNOSIS — Z23 NEED FOR PROPHYLACTIC VACCINATION AND INOCULATION AGAINST VARICELLA: ICD-10-CM

## 2024-08-26 DIAGNOSIS — E11.59 HYPERTENSION ASSOCIATED WITH DIABETES (HCC): ICD-10-CM

## 2024-08-26 DIAGNOSIS — E11.9 CONTROLLED TYPE 2 DIABETES MELLITUS WITHOUT COMPLICATION, WITHOUT LONG-TERM CURRENT USE OF INSULIN (HCC): ICD-10-CM

## 2024-08-26 PROBLEM — J30.9 ALLERGIC RHINITIS: Status: RESOLVED | Noted: 2019-03-08 | Resolved: 2024-08-26

## 2024-08-26 PROBLEM — M75.100 ROTATOR CUFF TEAR: Status: RESOLVED | Noted: 2023-07-19 | Resolved: 2024-08-26

## 2024-08-26 PROBLEM — E11.65 TYPE 2 DIABETES MELLITUS WITH HYPERGLYCEMIA (HCC): Status: RESOLVED | Noted: 2022-06-02 | Resolved: 2024-08-26

## 2024-08-26 PROBLEM — I10 ESSENTIAL HYPERTENSION: Status: RESOLVED | Noted: 2019-09-09 | Resolved: 2024-08-26

## 2024-08-26 PROBLEM — E11.40 TYPE 2 DIABETES MELLITUS WITH DIABETIC NEUROPATHY (HCC): Status: RESOLVED | Noted: 2022-05-25 | Resolved: 2024-08-26

## 2024-08-26 PROBLEM — G57.63 MORTON'S NEUROMA OF BOTH FEET: Status: RESOLVED | Noted: 2019-03-08 | Resolved: 2024-08-26

## 2024-08-26 PROBLEM — R07.9 CHEST PAIN: Status: RESOLVED | Noted: 2022-01-21 | Resolved: 2024-08-26

## 2024-08-26 PROBLEM — J02.9 ACUTE PHARYNGITIS: Status: RESOLVED | Noted: 2024-02-01 | Resolved: 2024-08-26

## 2024-08-26 PROBLEM — E78.6 LOW HDL (UNDER 40): Status: RESOLVED | Noted: 2019-09-09 | Resolved: 2024-08-26

## 2024-08-26 PROCEDURE — G8417 CALC BMI ABV UP PARAM F/U: HCPCS | Performed by: INTERNAL MEDICINE

## 2024-08-26 PROCEDURE — 1090F PRES/ABSN URINE INCON ASSESS: CPT | Performed by: INTERNAL MEDICINE

## 2024-08-26 PROCEDURE — 2022F DILAT RTA XM EVC RTNOPTHY: CPT | Performed by: INTERNAL MEDICINE

## 2024-08-26 PROCEDURE — 1123F ACP DISCUSS/DSCN MKR DOCD: CPT | Performed by: INTERNAL MEDICINE

## 2024-08-26 PROCEDURE — 1036F TOBACCO NON-USER: CPT | Performed by: INTERNAL MEDICINE

## 2024-08-26 PROCEDURE — 3044F HG A1C LEVEL LT 7.0%: CPT | Performed by: INTERNAL MEDICINE

## 2024-08-26 PROCEDURE — 3017F COLORECTAL CA SCREEN DOC REV: CPT | Performed by: INTERNAL MEDICINE

## 2024-08-26 PROCEDURE — G0446 INTENS BEHAVE THER CARDIO DX: HCPCS | Performed by: INTERNAL MEDICINE

## 2024-08-26 PROCEDURE — 99214 OFFICE O/P EST MOD 30 MIN: CPT | Performed by: INTERNAL MEDICINE

## 2024-08-26 PROCEDURE — G8399 PT W/DXA RESULTS DOCUMENT: HCPCS | Performed by: INTERNAL MEDICINE

## 2024-08-26 PROCEDURE — 99497 ADVNCD CARE PLAN 30 MIN: CPT | Performed by: INTERNAL MEDICINE

## 2024-08-26 PROCEDURE — 3078F DIAST BP <80 MM HG: CPT | Performed by: INTERNAL MEDICINE

## 2024-08-26 PROCEDURE — 3074F SYST BP LT 130 MM HG: CPT | Performed by: INTERNAL MEDICINE

## 2024-08-26 PROCEDURE — G0439 PPPS, SUBSEQ VISIT: HCPCS | Performed by: INTERNAL MEDICINE

## 2024-08-26 PROCEDURE — G0447 BEHAVIOR COUNSEL OBESITY 15M: HCPCS | Performed by: INTERNAL MEDICINE

## 2024-08-26 PROCEDURE — G8427 DOCREV CUR MEDS BY ELIG CLIN: HCPCS | Performed by: INTERNAL MEDICINE

## 2024-08-26 RX ORDER — LATANOPROST 50 UG/ML
1 SOLUTION/ DROPS OPHTHALMIC NIGHTLY
COMMUNITY
Start: 2024-08-02

## 2024-08-26 SDOH — HEALTH STABILITY: MENTAL HEALTH
STRESS IS WHEN SOMEONE FEELS TENSE, NERVOUS, ANXIOUS, OR CAN'T SLEEP AT NIGHT BECAUSE THEIR MIND IS TROUBLED. HOW STRESSED ARE YOU?: TO SOME EXTENT

## 2024-08-26 SDOH — SOCIAL STABILITY: SOCIAL NETWORK: ARE YOU MARRIED, WIDOWED, DIVORCED, SEPARATED, NEVER MARRIED, OR LIVING WITH A PARTNER?: PATIENT DECLINED

## 2024-08-26 SDOH — SOCIAL STABILITY: SOCIAL INSECURITY
WITHIN THE LAST YEAR, HAVE YOU BEEN KICKED, HIT, SLAPPED, OR OTHERWISE PHYSICALLY HURT BY YOUR PARTNER OR EX-PARTNER?: NO

## 2024-08-26 SDOH — SOCIAL STABILITY: SOCIAL INSECURITY: WITHIN THE LAST YEAR, HAVE YOU BEEN HUMILIATED OR EMOTIONALLY ABUSED IN OTHER WAYS BY YOUR PARTNER OR EX-PARTNER?: NO

## 2024-08-26 SDOH — SOCIAL STABILITY: SOCIAL NETWORK
IN A TYPICAL WEEK, HOW MANY TIMES DO YOU TALK ON THE PHONE WITH FAMILY, FRIENDS, OR NEIGHBORS?: MORE THAN THREE TIMES A WEEK

## 2024-08-26 SDOH — SOCIAL STABILITY: SOCIAL INSECURITY: WITHIN THE LAST YEAR, HAVE YOU BEEN AFRAID OF YOUR PARTNER OR EX-PARTNER?: NO

## 2024-08-26 SDOH — HEALTH STABILITY: MENTAL HEALTH: HOW OFTEN DO YOU HAVE A DRINK CONTAINING ALCOHOL?: MONTHLY OR LESS

## 2024-08-26 SDOH — ECONOMIC STABILITY: INCOME INSECURITY: IN THE LAST 12 MONTHS, WAS THERE A TIME WHEN YOU WERE NOT ABLE TO PAY THE MORTGAGE OR RENT ON TIME?: NO

## 2024-08-26 SDOH — ECONOMIC STABILITY: FOOD INSECURITY: WITHIN THE PAST 12 MONTHS, YOU WORRIED THAT YOUR FOOD WOULD RUN OUT BEFORE YOU GOT MONEY TO BUY MORE.: NEVER TRUE

## 2024-08-26 SDOH — ECONOMIC STABILITY: INCOME INSECURITY: HOW HARD IS IT FOR YOU TO PAY FOR THE VERY BASICS LIKE FOOD, HOUSING, MEDICAL CARE, AND HEATING?: NOT VERY HARD

## 2024-08-26 SDOH — SOCIAL STABILITY: SOCIAL NETWORK
DO YOU BELONG TO ANY CLUBS OR ORGANIZATIONS SUCH AS CHURCH GROUPS UNIONS, FRATERNAL OR ATHLETIC GROUPS, OR SCHOOL GROUPS?: YES

## 2024-08-26 SDOH — ECONOMIC STABILITY: FOOD INSECURITY: WITHIN THE PAST 12 MONTHS, THE FOOD YOU BOUGHT JUST DIDN'T LAST AND YOU DIDN'T HAVE MONEY TO GET MORE.: NEVER TRUE

## 2024-08-26 SDOH — SOCIAL STABILITY: SOCIAL INSECURITY
WITHIN THE LAST YEAR, HAVE TO BEEN RAPED OR FORCED TO HAVE ANY KIND OF SEXUAL ACTIVITY BY YOUR PARTNER OR EX-PARTNER?: NO

## 2024-08-26 SDOH — HEALTH STABILITY: MENTAL HEALTH: HOW MANY STANDARD DRINKS CONTAINING ALCOHOL DO YOU HAVE ON A TYPICAL DAY?: 1 OR 2

## 2024-08-26 SDOH — SOCIAL STABILITY: SOCIAL NETWORK: HOW OFTEN DO YOU ATTEND CHURCH OR RELIGIOUS SERVICES?: 1 TO 4 TIMES PER YEAR

## 2024-08-26 SDOH — SOCIAL STABILITY: SOCIAL NETWORK: HOW OFTEN DO YOU ATTENT MEETINGS OF THE CLUB OR ORGANIZATION YOU BELONG TO?: 1 TO 4 TIMES PER YEAR

## 2024-08-26 SDOH — HEALTH STABILITY: PHYSICAL HEALTH: ON AVERAGE, HOW MANY DAYS PER WEEK DO YOU ENGAGE IN MODERATE TO STRENUOUS EXERCISE (LIKE A BRISK WALK)?: 4 DAYS

## 2024-08-26 SDOH — SOCIAL STABILITY: SOCIAL NETWORK: HOW OFTEN DO YOU GET TOGETHER WITH FRIENDS OR RELATIVES?: THREE TIMES A WEEK

## 2024-08-26 SDOH — HEALTH STABILITY: PHYSICAL HEALTH: ON AVERAGE, HOW MANY MINUTES DO YOU ENGAGE IN EXERCISE AT THIS LEVEL?: 20 MIN

## 2024-08-26 SDOH — ECONOMIC STABILITY: TRANSPORTATION INSECURITY
IN THE PAST 12 MONTHS, HAS LACK OF TRANSPORTATION KEPT YOU FROM MEETINGS, WORK, OR FROM GETTING THINGS NEEDED FOR DAILY LIVING?: NO

## 2024-08-26 ASSESSMENT — ENCOUNTER SYMPTOMS
VOICE CHANGE: 0
SHORTNESS OF BREATH: 0
CHEST TIGHTNESS: 0
CONSTIPATION: 0
NAUSEA: 0
ABDOMINAL PAIN: 0
SINUS PAIN: 0
DIARRHEA: 0
COUGH: 0
WHEEZING: 0
BLOOD IN STOOL: 0

## 2024-08-26 ASSESSMENT — PATIENT HEALTH QUESTIONNAIRE - PHQ9
6. FEELING BAD ABOUT YOURSELF - OR THAT YOU ARE A FAILURE OR HAVE LET YOURSELF OR YOUR FAMILY DOWN: NOT AT ALL
SUM OF ALL RESPONSES TO PHQ QUESTIONS 1-9: 0
3. TROUBLE FALLING OR STAYING ASLEEP: NOT AT ALL
5. POOR APPETITE OR OVEREATING: NOT AT ALL
SUM OF ALL RESPONSES TO PHQ QUESTIONS 1-9: 0
1. LITTLE INTEREST OR PLEASURE IN DOING THINGS: NOT AT ALL
2. FEELING DOWN, DEPRESSED OR HOPELESS: NOT AT ALL
9. THOUGHTS THAT YOU WOULD BE BETTER OFF DEAD, OR OF HURTING YOURSELF: NOT AT ALL
SUM OF ALL RESPONSES TO PHQ QUESTIONS 1-9: 0
8. MOVING OR SPEAKING SO SLOWLY THAT OTHER PEOPLE COULD HAVE NOTICED. OR THE OPPOSITE, BEING SO FIGETY OR RESTLESS THAT YOU HAVE BEEN MOVING AROUND A LOT MORE THAN USUAL: NOT AT ALL
10. IF YOU CHECKED OFF ANY PROBLEMS, HOW DIFFICULT HAVE THESE PROBLEMS MADE IT FOR YOU TO DO YOUR WORK, TAKE CARE OF THINGS AT HOME, OR GET ALONG WITH OTHER PEOPLE: NOT DIFFICULT AT ALL
SUM OF ALL RESPONSES TO PHQ QUESTIONS 1-9: 0
7. TROUBLE CONCENTRATING ON THINGS, SUCH AS READING THE NEWSPAPER OR WATCHING TELEVISION: NOT AT ALL
SUM OF ALL RESPONSES TO PHQ9 QUESTIONS 1 & 2: 0
4. FEELING TIRED OR HAVING LITTLE ENERGY: NOT AT ALL

## 2024-08-26 ASSESSMENT — LIFESTYLE VARIABLES
HOW MANY STANDARD DRINKS CONTAINING ALCOHOL DO YOU HAVE ON A TYPICAL DAY: 1 OR 2
HOW OFTEN DO YOU HAVE A DRINK CONTAINING ALCOHOL: MONTHLY OR LESS

## 2024-08-26 ASSESSMENT — COLUMBIA-SUICIDE SEVERITY RATING SCALE - C-SSRS
1. WITHIN THE PAST MONTH, HAVE YOU WISHED YOU WERE DEAD OR WISHED YOU COULD GO TO SLEEP AND NOT WAKE UP?: NO
6. HAVE YOU EVER DONE ANYTHING, STARTED TO DO ANYTHING, OR PREPARED TO DO ANYTHING TO END YOUR LIFE?: NO
2. HAVE YOU ACTUALLY HAD ANY THOUGHTS OF KILLING YOURSELF?: NO

## 2024-08-26 NOTE — PATIENT INSTRUCTIONS
Eating Healthy Foods: Care Instructions  With every meal, you can make healthy food choices. Try to eat a variety of fruits, vegetables, whole grains, lean proteins, and low-fat dairy products. This can help you get the right balance of nutrients, including vitamins and minerals. Small changes add up over time. You can start by adding one healthy food to your meals each day.    Try to make half your plate fruits and vegetables, one-fourth whole grains, and one-fourth lean proteins. Try including dairy with your meals.   Eat more fruits and vegetables. Try to have them with most meals and snacks.   Foods for healthy eating        Fruits   These can be fresh, frozen, canned, or dried.  Try to choose whole fruit rather than fruit juice.  Eat a variety of colors.        Vegetables   These can be fresh, frozen, canned, or dried.  Beans, peas, and lentils count too.        Whole grains   Choose whole-grain breads, cereals, and noodles.  Try brown rice.        Lean proteins   These can include lean meat, poultry, fish, and eggs.  You can also have tofu, beans, peas, lentils, nuts, and seeds.        Dairy   Try milk, yogurt, and cheese.  Choose low-fat or fat-free when you can.  If you need to, use lactose-free milk or fortified plant-based milk products, such as soy milk.        Water   Drink water when you're thirsty.  Limit sugar-sweetened drinks, including soda, fruit drinks, and sports drinks.  Where can you learn more?  Go to https://www.[employer].net/patientEd and enter T756 to learn more about \"Eating Healthy Foods: Care Instructions.\"  Current as of: September 20, 2023  Content Version: 14.1  © 4567-6700 [employer].   Care instructions adapted under license by [employer]. If you have questions about a medical condition or this instruction, always ask your healthcare professional. [employer] disclaims any warranty or liability for your use of this information.           Starting a  hydration and nutrition, such as being fed through a tube.  Is a living will a legal document?  A living will is a legal document. Each state has its own laws about living larry. And a living will may be called something else in your state.  Here are some things to know about living larry.  You don't need an  to complete a living will. But legal advice can be helpful if your state's laws are unclear. It can also help if your health history is complicated or your family can't agree on what should be in your living will.  You can change your living will at any time. Some people find that their wishes about end-of-life care change as their health changes. If you make big changes to your living will, complete a new form.  If you move to another state, make sure that your living will is legal in the state where you now live. In most cases, doctors will respect your wishes even if you have a form from a different state.  You might use a universal form that has been approved by many states. This kind of form can sometimes be filled out and stored online. Your digital copy will then be available wherever you have a connection to the internet. The doctors and nurses who need to treat you can find it right away.  Your state may offer an online registry. This is another place where you can store your living will online.  It's a good idea to get your living will notarized. This means using a person called a  to watch two people sign, or witness, your living will.  What should you know when you create a living will?  Here are some questions to ask yourself as you make your living will.  Do you know enough about life support methods that might be used? If not, talk to your doctor so you know what might be done if you can't breathe on your own, your heart stops, or you can't swallow.  What things would you still want to be able to do after you receive life-support methods? Would you want to be able to walk? To

## 2024-08-26 NOTE — ASSESSMENT & PLAN NOTE
wePatient counseled on healthy dietary choices and the benefits of a lower salt and/or lower carbohydrate diet as appropriate. Patient also counseled on benefits of moderate intensity cardiovascular exercise for 150 minutes per ek as they are able. Advice was given to make small changes over time, setting smaller achievable goals until recommended lifestyle changes are reached.

## 2024-08-26 NOTE — PROGRESS NOTES
Medicare Annual Wellness Visit    Kaylin Yap is here for Medicare AWV (/)    Assessment & Plan   Medicare annual wellness visit, subsequent  Body mass index (BMI) 37.0-37.9, adult  -     NE Behavior  obesity (8-15 min) []  Acute pain  Body mass index (BMI) of 37.0-37.9 in adult  ACP (advance care planning)  -     NE Behavior  obesity (8-15 min) []  -     Full code  -     NE Advanced Care Planning (16-30 minutes) [95451]  Need for prophylactic vaccination and inoculation against varicella  -     zoster recombinant adjuvanted vaccine (SHINGRIX) 50 MCG/0.5ML SUSR injection; Inject 0.5 mLs into the muscle once for 1 dose, Disp-0.5 mL, R-0Print  Screening for cardiovascular condition  -     NE Intensive Behavior Counseling for Cardiovascular Diseases, 8-15 minutes []  Recommendations for Preventive Services Due: see orders and patient instructions/AVS.  Recommended screening schedule for the next 5-10 years is provided to the patient in written form: see Patient Instructions/AVS.     Return in 3 months (on 11/26/2024).     Subjective       Patient's complete Health Risk Assessment and screening values have been reviewed and are found in Flowsheets. The following problems were reviewed today and where indicated follow up appointments were made and/or referrals ordered.    Positive Risk Factor Screenings with Interventions:               General HRA Questions:  Select all that apply: (!) New or Increased Pain  Interventions - Pain:  See AVS for additional education material       Poor Eating Habits/Diet:  Do you eat balanced/healthy meals regularly?: (!) No  Interventions:  See AVS for additional education material    Abnormal BMI (obese):  Body mass index is 37.97 kg/m². (!) Abnormal  Interventions:  See AVS for additional education material    Obesity Counseling: Patient was asked about her current diet and exercise habits, and personalized advice was provided regarding recommended  recombinant adjuvanted vaccine (SHINGRIX) 50 MCG/0.5ML SUSR injection Inject 0.5 mLs into the muscle once for 1 dose Yes Martina Hernandez MD   gabapentin (NEURONTIN) 100 MG capsule Take 2 capsules by mouth nightly for 90 days. Yes Martina Hernandez MD   TRULICITY 1.5 MG/0.5ML SC injection Inject 0.5 mLs into the skin once a week for 90 doses Yes Martina Hernandez MD   VENTOLIN  (90 Base) MCG/ACT inhaler INHALE 2 PUFFS EVERY 4 HOURS AS NEEDED Yes Martina Hernandez MD   Continuous Glucose Sensor (FREESTYLE ANA MARÍA 3 SENSOR) MISC 1 each by Does not apply route every 14 days Yes Alen Phillips APRN - CNP   Continuous Glucose Sensor (FREESTYLE ANA MARÍA 3 SENSOR) MISC 1 each by Does not apply route every 14 days Yes Dion High MD   glimepiride (AMARYL) 2 MG tablet Take 1 tablet by mouth every morning (before breakfast) Yes Alen Phillips APRN - CNP   losartan (COZAAR) 25 MG tablet Take 1 tablet by mouth daily Yes Alen Phillips APRN - CNP   metFORMIN (GLUCOPHAGE) 1000 MG tablet Take 1 tablet by mouth 2 times daily (with meals) Yes Alen Phillips APRN - CNP   montelukast (SINGULAIR) 10 MG tablet Take 1 tablet by mouth nightly Yes Alen Phillips APRN - CNP   metoprolol succinate (TOPROL XL) 100 MG extended release tablet Take 1.5 tablets by mouth daily Yes [place] Hamilton MD   dilTIAZem (CARDIZEM CD) 120 MG extended release capsule Take 1 capsule by mouth daily Yes [place] Hamilton MD   chlorthalidone (HYGROTON) 25 MG tablet Take 1 tablet by mouth daily Yes [place] Hamilton MD   atorvastatin (LIPITOR) 20 MG tablet Take 1 tablet by mouth nightly Yes [place] Hamilton MD   Potassium Gluconate 595 (99 K) MG TABS Take by mouth Yes Dion High MD   blood glucose monitor strips Test 2 times a day & as needed for symptoms of irregular blood glucose. Dispense sufficient amount for indicated testing frequency plus additional to accommodate PRN testing needs. One

## 2024-08-26 NOTE — ASSESSMENT & PLAN NOTE
POCT glycosylated hemoglobin (Hb A1C)  -  A1C stable at 5.8  -  Diabetes Counseling : Discussed disease risks, adopting healthy behaviors,  monitoring glucose and bringing logs to visits  - Discussed most recent labs in detail, foot care, and compliance with all medications, and yearly eye exam  - Diet: Addressed ADA, low sodium and low cholesterol diet.  - Exercise: discussed need for aerobic exercise 3 times weekly  - Medications:   Continued current med dose

## 2024-08-26 NOTE — PROGRESS NOTES
Negative for cold intolerance, heat intolerance and polyuria.   Genitourinary:  Negative for difficulty urinating, dysuria, hematuria, menstrual problem, pelvic pain and vaginal discharge.   Musculoskeletal:  Negative for arthralgias, gait problem, joint swelling and myalgias.   Skin:  Negative for pallor and rash.   Allergic/Immunologic: Negative for environmental allergies, food allergies and immunocompromised state.   Neurological:  Negative for dizziness, tremors, seizures, syncope, facial asymmetry, speech difficulty, weakness, light-headedness, numbness and headaches.   Hematological:  Does not bruise/bleed easily.   Psychiatric/Behavioral:  Negative for confusion, hallucinations, sleep disturbance and suicidal ideas. The patient is not nervous/anxious.       Physical Exam  Constitutional:       General: She is not in acute distress.     Appearance: Normal appearance.   HENT:      Head: Normocephalic and atraumatic.      Nose: Nose normal.      Mouth/Throat:      Mouth: Mucous membranes are moist.   Eyes:      Extraocular Movements: Extraocular movements intact.      Conjunctiva/sclera: Conjunctivae normal.      Pupils: Pupils are equal, round, and reactive to light.   Neck:      Vascular: No carotid bruit.   Cardiovascular:      Rate and Rhythm: Normal rate and regular rhythm.      Pulses: Normal pulses.      Heart sounds: Normal heart sounds.   Pulmonary:      Effort: Pulmonary effort is normal.      Breath sounds: Normal breath sounds. No wheezing, rhonchi or rales.   Abdominal:      General: Abdomen is flat. Bowel sounds are normal.      Palpations: Abdomen is soft.   Musculoskeletal:         General: Normal range of motion.      Cervical back: Normal range of motion and neck supple.      Right lower leg: No edema.      Left lower leg: No edema.   Skin:     General: Skin is warm and dry.      Findings: No lesion or rash.   Neurological:      General: No focal deficit present.      Mental Status: She is  pt to lose hearing    Molds & Smuts      Causes sneezing and asthma    Percocet [Oxycodone-Acetaminophen] Nausea And Vomiting       Current Outpatient Medications:     latanoprost (XALATAN) 0.005 % ophthalmic solution, Place 1 drop into both eyes nightly, Disp: , Rfl:     gabapentin (NEURONTIN) 100 MG capsule, Take 2 capsules by mouth nightly for 90 days., Disp: 60 capsule, Rfl: 2    TRULICITY 1.5 MG/0.5ML SC injection, Inject 0.5 mLs into the skin once a week for 90 doses, Disp: 2 Adjustable Dose Pre-filled Pen Syringe, Rfl: 2    VENTOLIN  (90 Base) MCG/ACT inhaler, INHALE 2 PUFFS EVERY 4 HOURS AS NEEDED, Disp: 1 each, Rfl: 3    Continuous Glucose Sensor (FREESTYLE ANA MARÍA 3 SENSOR) MISC, 1 each by Does not apply route every 14 days, Disp: 2 each, Rfl: 4    Continuous Glucose Sensor (FREESTYLE ANA MARÍA 3 SENSOR) MISC, 1 each by Does not apply route every 14 days, Disp: , Rfl:     glimepiride (AMARYL) 2 MG tablet, Take 1 tablet by mouth every morning (before breakfast), Disp: 180 tablet, Rfl: 2    losartan (COZAAR) 25 MG tablet, Take 1 tablet by mouth daily, Disp: 90 tablet, Rfl: 1    metFORMIN (GLUCOPHAGE) 1000 MG tablet, Take 1 tablet by mouth 2 times daily (with meals), Disp: 180 tablet, Rfl: 2    montelukast (SINGULAIR) 10 MG tablet, Take 1 tablet by mouth nightly, Disp: 90 tablet, Rfl: 2    metoprolol succinate (TOPROL XL) 100 MG extended release tablet, Take 1.5 tablets by mouth daily, Disp: 180 tablet, Rfl: 5    dilTIAZem (CARDIZEM CD) 120 MG extended release capsule, Take 1 capsule by mouth daily, Disp: 90 capsule, Rfl: 5    chlorthalidone (HYGROTON) 25 MG tablet, Take 1 tablet by mouth daily, Disp: 90 tablet, Rfl: 3    atorvastatin (LIPITOR) 20 MG tablet, Take 1 tablet by mouth nightly, Disp: 90 tablet, Rfl: 3    Potassium Gluconate 595 (99 K) MG TABS, Take by mouth, Disp: , Rfl:     blood glucose monitor strips, Test 2 times a day & as needed for symptoms of irregular blood glucose. Dispense sufficient

## 2024-09-16 ENCOUNTER — OFFICE VISIT (OUTPATIENT)
Dept: ORTHOPEDIC SURGERY | Facility: CLINIC | Age: 69
End: 2024-09-16
Payer: MEDICARE

## 2024-09-16 ENCOUNTER — HOSPITAL ENCOUNTER (OUTPATIENT)
Dept: RADIOLOGY | Facility: CLINIC | Age: 69
Discharge: HOME | End: 2024-09-16
Payer: MEDICARE

## 2024-09-16 DIAGNOSIS — M17.12 PRIMARY OSTEOARTHRITIS OF LEFT KNEE: Primary | ICD-10-CM

## 2024-09-16 DIAGNOSIS — M25.562 LEFT KNEE PAIN, UNSPECIFIED CHRONICITY: ICD-10-CM

## 2024-09-16 PROCEDURE — 4010F ACE/ARB THERAPY RXD/TAKEN: CPT | Performed by: ORTHOPAEDIC SURGERY

## 2024-09-16 PROCEDURE — 99214 OFFICE O/P EST MOD 30 MIN: CPT | Performed by: ORTHOPAEDIC SURGERY

## 2024-09-16 PROCEDURE — 20610 DRAIN/INJ JOINT/BURSA W/O US: CPT | Mod: LT | Performed by: ORTHOPAEDIC SURGERY

## 2024-09-16 PROCEDURE — 73564 X-RAY EXAM KNEE 4 OR MORE: CPT | Mod: LT

## 2024-09-16 PROCEDURE — 73564 X-RAY EXAM KNEE 4 OR MORE: CPT | Mod: LEFT SIDE | Performed by: ORTHOPAEDIC SURGERY

## 2024-09-16 PROCEDURE — 1159F MED LIST DOCD IN RCRD: CPT | Performed by: ORTHOPAEDIC SURGERY

## 2024-09-16 PROCEDURE — 2500000004 HC RX 250 GENERAL PHARMACY W/ HCPCS (ALT 636 FOR OP/ED): Mod: JZ | Performed by: ORTHOPAEDIC SURGERY

## 2024-09-16 PROCEDURE — 99213 OFFICE O/P EST LOW 20 MIN: CPT | Performed by: ORTHOPAEDIC SURGERY

## 2024-09-16 NOTE — PROGRESS NOTES
History: Andra is here for her left knee.  She has had pain on and off for several months.  She has known arthritic change to the knee.  She did get a new puppy and has been trying to get down on the floor to play with him more often.  She has noticed increased pain recently.    Past medical history: Multiple  Medications: Multiple  Allergies: No known drug allergies    Please refer to the intake H&P regarding the patient's review of systems, family history and social history as was done today    HEENT: Normal  Lungs: Clear to auscultation  Heart: RRR  Abdomen: Soft, nontender  Skin: clear  Extremity: There is 1+ crepitus with range of motion.  She has mild tenderness both medial and laterally.  Mild laxity with varus stress.  Negative Lachman and posterior drawer.  Skin is clear.  She denies any numbness or tingling.  Contralateral exam is normal for strength, motion, stability and neurovascular assessment.    Radiographs: 4 view left knee x-rays show moderate medial and lateral joint space narrowing with spurring in all 3 compartments.  Alignment is fairly neutral.    Assessment: Moderate left knee DJD    Plan: She cannot tolerate cortisone due to the glucose elevations.  She would like to try viscosupplementation.  We can consider bracing or other modalities if not improving.  She does understand the potential for total knee arthroplasty in her future.  All questions were answered today with the patient.    For complete plan and/or surgical details, please refer to Dr. Hazel's portion of this split/shared dictation.     Eryn Rojo PA-C    In a face-to-face encounter today, INEZ Hazel MD performed a history and physical examination, discussed pertinent diagnostic studies if indicated, and discussed diagnosis and management strategies with both the patient and the midlevel provider.  I reviewed the midlevel's note and agree with the documented findings and plan of care.  Greater than 50% of the  evaluation and treatment decision was performed by the physician myself during today's visit.    Andra is getting some recurrent pain in the knees.  She has moderate arthritis and is interested in trying a gel injection.  We discussed bracing and other options in the future.  Cortisone injections elevate her blood sugar significantly.  We will see her back over the next few months to assess progress with the viscosupplementation trial.    L Inj/Asp: L knee on 9/16/2024 8:45 AM  Indications: pain  Details: 22 G needle, lateral approach  Medications: 30 mg sodium hyaluronate, cross-linked 30 mg/3 mL  Outcome: tolerated well, no immediate complications  Procedure, treatment alternatives, risks and benefits explained, specific risks discussed. Consent was given by the patient. Immediately prior to procedure a time out was called to verify the correct patient, procedure, equipment, support staff and site/side marked as required. Patient was prepped and draped in the usual sterile fashion.              This note was generated with voice recognition software and may contain grammatical errors.

## 2024-09-25 PROBLEM — Z00.00 MEDICARE ANNUAL WELLNESS VISIT, SUBSEQUENT: Status: RESOLVED | Noted: 2024-08-26 | Resolved: 2024-09-25

## 2024-09-25 PROBLEM — Z13.6 SCREENING FOR CARDIOVASCULAR CONDITION: Status: RESOLVED | Noted: 2024-08-26 | Resolved: 2024-09-25

## 2024-10-30 ENCOUNTER — TELEPHONE (OUTPATIENT)
Dept: FAMILY MEDICINE CLINIC | Age: 69
End: 2024-10-30

## 2024-10-30 DIAGNOSIS — E11.40 TYPE 2 DIABETES MELLITUS WITH DIABETIC NEUROPATHY, UNSPECIFIED WHETHER LONG TERM INSULIN USE (HCC): ICD-10-CM

## 2024-10-30 NOTE — TELEPHONE ENCOUNTER
Pt called to have her trulicity refilled. It is not allowing the refill to go through. Please advise.

## 2024-11-04 DIAGNOSIS — E11.9 CONTROLLED TYPE 2 DIABETES MELLITUS WITHOUT COMPLICATION, WITHOUT LONG-TERM CURRENT USE OF INSULIN (HCC): Primary | ICD-10-CM

## 2024-11-05 DIAGNOSIS — E11.40 TYPE 2 DIABETES MELLITUS WITH DIABETIC NEUROPATHY, UNSPECIFIED WHETHER LONG TERM INSULIN USE (HCC): ICD-10-CM

## 2024-11-05 RX ORDER — BLOOD-GLUCOSE SENSOR
1 EACH MISCELLANEOUS
Qty: 2 EACH | Refills: 4 | Status: SHIPPED | OUTPATIENT
Start: 2024-11-05

## 2024-11-05 NOTE — TELEPHONE ENCOUNTER
Comments:     Last Office Visit (last PCP visit):   2024    Next Visit Date:  Future Appointments   Date Time Provider Department Center   2024  9:00 AM Alla Fischer MD [facility]   2024  8:30 AM Martina Hernandez MD [facility]   2024  8:00 AM [place], [place], [facility]       **If hasn't been seen in over a year OR hasn't followed up according to last diabetes/ADHD visit, make appointment for patient before sending refill to provider.    Rx requested:  Requested Prescriptions     Pending Prescriptions Disp Refills    Continuous Glucose Sensor (FREESTYLE ANA MARÍA 3 SENSOR) MISC 2 each 4     Si each by Does not apply route every 14 days

## 2024-11-05 NOTE — TELEPHONE ENCOUNTER
Please send a new script sent over to  [facility] for Freestyle Benjamín 3 PLUS  every 15 days .    Pharmacy stated they can take a verbal to switch over to the Freestyle Benjamín 3 PLUS     Please advise

## 2024-11-14 NOTE — PROGRESS NOTES
History: Andra is here for her left knee. She has known arthritic change to the knee. She had some relief from the gel injection on 09/16/24. She cannot do cortisone injections due to her diabetes. She still has tenderness and stiffness with certain movements. She uses Advil and Salonpas. She does like to walk for exercise.      Past medical history: Multiple  Medications: Multiple  Allergies: No known drug allergies    Please refer to the intake H&P regarding the patient's review of systems, family history and social history as was done today    HEENT: Normal  Lungs: Clear to auscultation  Heart: RRR  Abdomen: Soft, nontender  Skin: clear  Extremity: She has fairly minimal swelling in the knee.  No sign of redness or warmth.  There is some diffuse tenderness medially and in the patellofemoral joint.  No numbness or tingling.  Contralateral exam is normal for strength, motion, stability and neurovascular assessment.    Radiographs: X-rays show moderate tricompartmental DJD with spurring throughout.    Assessment: Moderate left knee DJD    Plan: Her left knee is not showing much improvement after gel injections. We discussed several options for treatment including anti-inflammatories and bracing, as she is not ready for a knee replacement at this time. I prescribed Mobic today. She will see me back at her discretion.   She cannot do cortisone injections due to her sugars but will consider bracing or other modalities if needed.  All questions were answered today with the patient.      Scribe Attestation  By signing my name below, Qi WILEY Scribe   attest that this documentation has been prepared under the direction and in the presence of Dustin Hazel MD.

## 2024-11-18 ENCOUNTER — OFFICE VISIT (OUTPATIENT)
Dept: ORTHOPEDIC SURGERY | Facility: CLINIC | Age: 69
End: 2024-11-18
Payer: MEDICARE

## 2024-11-18 DIAGNOSIS — M17.12 PRIMARY OSTEOARTHRITIS OF LEFT KNEE: ICD-10-CM

## 2024-11-18 DIAGNOSIS — M25.562 LEFT KNEE PAIN, UNSPECIFIED CHRONICITY: ICD-10-CM

## 2024-11-18 PROCEDURE — 99214 OFFICE O/P EST MOD 30 MIN: CPT | Performed by: ORTHOPAEDIC SURGERY

## 2024-11-18 PROCEDURE — 4010F ACE/ARB THERAPY RXD/TAKEN: CPT | Performed by: ORTHOPAEDIC SURGERY

## 2024-11-18 RX ORDER — MELOXICAM 15 MG/1
15 TABLET ORAL DAILY
Qty: 30 TABLET | Refills: 0 | Status: SHIPPED | OUTPATIENT
Start: 2024-11-18 | End: 2024-12-18

## 2024-11-22 DIAGNOSIS — G62.9 NEUROPATHY: ICD-10-CM

## 2024-11-22 RX ORDER — GABAPENTIN 100 MG/1
200 CAPSULE ORAL NIGHTLY
Qty: 60 CAPSULE | Refills: 1 | Status: SHIPPED | OUTPATIENT
Start: 2024-11-22 | End: 2025-02-20

## 2024-11-26 ENCOUNTER — HOSPITAL ENCOUNTER (OUTPATIENT)
Dept: LAB | Age: 69
Discharge: HOME | End: 2024-11-26
Payer: MEDICARE

## 2024-11-26 DIAGNOSIS — I10 ESSENTIAL HYPERTENSION: ICD-10-CM

## 2024-11-26 DIAGNOSIS — E78.5 DYSLIPIDEMIA: ICD-10-CM

## 2024-11-26 DIAGNOSIS — E11.40 TYPE 2 DIABETES MELLITUS WITH DIABETIC NEUROPATHY, UNSPECIFIED WHETHER LONG TERM INSULIN USE (HCC): ICD-10-CM

## 2024-11-26 DIAGNOSIS — E87.6 HYPOKALEMIA: Primary | ICD-10-CM

## 2024-11-26 DIAGNOSIS — E53.8 B12 DEFICIENCY: ICD-10-CM

## 2024-11-26 LAB
ALBUMIN SERPL-MCNC: 4.1 G/DL (ref 3.5–4.6)
ALP SERPL-CCNC: 79 U/L (ref 40–130)
ALT SERPL-CCNC: 16 U/L (ref 0–33)
ANION GAP SERPL CALCULATED.3IONS-SCNC: 12 MEQ/L (ref 9–15)
AST SERPL-CCNC: 23 U/L (ref 0–35)
BASOPHILS # BLD: 0 K/UL (ref 0–0.2)
BASOPHILS NFR BLD: 0.3 %
BILIRUB SERPL-MCNC: 0.6 MG/DL (ref 0.2–0.7)
BUN SERPL-MCNC: 17 MG/DL (ref 8–23)
CALCIUM SERPL-MCNC: 9.2 MG/DL (ref 8.5–9.9)
CHLORIDE SERPL-SCNC: 100 MEQ/L (ref 95–107)
CHOLEST SERPL-MCNC: 101 MG/DL (ref 0–199)
CO2 SERPL-SCNC: 28 MEQ/L (ref 20–31)
CREAT SERPL-MCNC: 0.57 MG/DL (ref 0.5–0.9)
EOSINOPHIL # BLD: 0.2 K/UL (ref 0–0.7)
EOSINOPHIL NFR BLD: 2.9 %
ERYTHROCYTE [DISTWIDTH] IN BLOOD BY AUTOMATED COUNT: 14.1 % (ref 11.5–14.5)
ESTIMATED AVERAGE GLUCOSE: 126 MG/DL
FOLATE: 29.7 NG/ML (ref 4.8–24.2)
GLOBULIN SER CALC-MCNC: 2.6 G/DL (ref 2.3–3.5)
GLUCOSE SERPL-MCNC: 120 MG/DL (ref 70–99)
HBA1C MFR BLD: 6 % (ref 4–6)
HCT VFR BLD AUTO: 42.8 % (ref 37–47)
HDLC SERPL-MCNC: 45 MG/DL (ref 40–59)
HGB BLD-MCNC: 14.2 G/DL (ref 12–16)
LDLC SERPL CALC-MCNC: 39 MG/DL (ref 0–129)
LYMPHOCYTES # BLD: 2 K/UL (ref 1–4.8)
LYMPHOCYTES NFR BLD: 27.9 %
MCH RBC QN AUTO: 29.9 PG (ref 27–31.3)
MCHC RBC AUTO-ENTMCNC: 33.2 % (ref 33–37)
MCV RBC AUTO: 90.1 FL (ref 79.4–94.8)
MONOCYTES # BLD: 0.6 K/UL (ref 0.2–0.8)
MONOCYTES NFR BLD: 7.7 %
NEUTROPHILS # BLD: 4.5 K/UL (ref 1.4–6.5)
NEUTS SEG NFR BLD: 61.1 %
PLATELET # BLD AUTO: 315 K/UL (ref 130–400)
POTASSIUM SERPL-SCNC: 3.3 MEQ/L (ref 3.4–4.9)
PROT SERPL-MCNC: 6.7 G/DL (ref 6.3–8)
RBC # BLD AUTO: 4.75 M/UL (ref 4.2–5.4)
SODIUM SERPL-SCNC: 140 MEQ/L (ref 135–144)
TRIGL SERPL-MCNC: 87 MG/DL (ref 0–150)
VITAMIN B-12: 548 PG/ML (ref 232–1245)
WBC # BLD AUTO: 7.3 K/UL (ref 4.8–10.8)

## 2024-11-26 PROCEDURE — 80061 LIPID PANEL: CPT

## 2024-11-26 PROCEDURE — 83036 HEMOGLOBIN GLYCOSYLATED A1C: CPT

## 2024-11-26 PROCEDURE — 36415 COLL VENOUS BLD VENIPUNCTURE: CPT

## 2024-11-26 PROCEDURE — 82746 ASSAY OF FOLIC ACID SERUM: CPT

## 2024-11-26 PROCEDURE — 85025 COMPLETE CBC W/AUTO DIFF WBC: CPT

## 2024-11-26 PROCEDURE — 80053 COMPREHEN METABOLIC PANEL: CPT

## 2024-11-26 PROCEDURE — 82607 VITAMIN B-12: CPT

## 2024-11-29 ENCOUNTER — OFFICE VISIT (OUTPATIENT)
Dept: INTERNAL MEDICINE | Age: 69
End: 2024-11-29

## 2024-11-29 VITALS
WEIGHT: 195.4 LBS | HEIGHT: 60 IN | SYSTOLIC BLOOD PRESSURE: 126 MMHG | BODY MASS INDEX: 38.36 KG/M2 | HEART RATE: 74 BPM | DIASTOLIC BLOOD PRESSURE: 72 MMHG | RESPIRATION RATE: 16 BRPM | OXYGEN SATURATION: 96 %

## 2024-11-29 DIAGNOSIS — E87.6 HYPOKALEMIA: ICD-10-CM

## 2024-11-29 DIAGNOSIS — G62.9 NEUROPATHY: Primary | ICD-10-CM

## 2024-11-29 DIAGNOSIS — E11.9 CONTROLLED TYPE 2 DIABETES MELLITUS WITHOUT COMPLICATION, WITHOUT LONG-TERM CURRENT USE OF INSULIN (HCC): ICD-10-CM

## 2024-11-29 PROBLEM — R52 ACUTE PAIN: Status: RESOLVED | Noted: 2024-08-26 | Resolved: 2024-11-29

## 2024-11-29 PROBLEM — E66.01 SEVERE OBESITY (BMI 35.0-39.9) WITH COMORBIDITY: Status: RESOLVED | Noted: 2024-02-01 | Resolved: 2024-11-29

## 2024-11-29 PROBLEM — Z23 NEED FOR PROPHYLACTIC VACCINATION AND INOCULATION AGAINST VARICELLA: Status: RESOLVED | Noted: 2024-08-26 | Resolved: 2024-11-29

## 2024-11-29 PROBLEM — Z71.89 ACP (ADVANCE CARE PLANNING): Status: RESOLVED | Noted: 2024-08-26 | Resolved: 2024-11-29

## 2024-11-29 LAB
ANION GAP SERPL CALCULATED.3IONS-SCNC: 8 MEQ/L (ref 9–15)
BUN SERPL-MCNC: 16 MG/DL (ref 8–23)
CALCIUM SERPL-MCNC: 9.6 MG/DL (ref 8.5–9.9)
CHLORIDE SERPL-SCNC: 99 MEQ/L (ref 95–107)
CO2 SERPL-SCNC: 33 MEQ/L (ref 20–31)
CREAT SERPL-MCNC: 0.63 MG/DL (ref 0.5–0.9)
GLUCOSE SERPL-MCNC: 128 MG/DL (ref 70–99)
POTASSIUM SERPL-SCNC: 4 MEQ/L (ref 3.4–4.9)
SODIUM SERPL-SCNC: 140 MEQ/L (ref 135–144)

## 2024-11-29 RX ORDER — MELOXICAM 15 MG/1
15 TABLET ORAL DAILY
COMMUNITY
Start: 2024-11-18

## 2024-11-29 RX ORDER — GABAPENTIN 100 MG/1
200 CAPSULE ORAL NIGHTLY
Qty: 180 CAPSULE | Refills: 0 | Status: SHIPPED | OUTPATIENT
Start: 2024-11-29 | End: 2025-02-27

## 2024-11-29 SDOH — HEALTH STABILITY: PHYSICAL HEALTH: ON AVERAGE, HOW MANY DAYS PER WEEK DO YOU ENGAGE IN MODERATE TO STRENUOUS EXERCISE (LIKE A BRISK WALK)?: 3 DAYS

## 2024-11-29 SDOH — HEALTH STABILITY: PHYSICAL HEALTH: ON AVERAGE, HOW MANY MINUTES DO YOU ENGAGE IN EXERCISE AT THIS LEVEL?: 30 MIN

## 2024-11-29 ASSESSMENT — ENCOUNTER SYMPTOMS
ABDOMINAL PAIN: 0
SHORTNESS OF BREATH: 0

## 2024-11-29 NOTE — ASSESSMENT & PLAN NOTE
Acute, uncomplicated  Likely 2/2 medication   Continue supplement, if abnormal on this lab will stop hygroton

## 2024-11-29 NOTE — PROGRESS NOTES
[facility]  [address] [facility] [address]  Dept: [phone]  Dept Fax: [phone]  Loc: [phone]     Visit type: Established patient  Reason for Visit: New Patient (Here to establish.Previous patient of . ) and Discuss Labs (Patient recently had to start taking two pills of Potassium daily. Recently had lab work and potassium was low. )    Assessment/Plan   1. Neuropathy  Assessment & Plan:   Chronic, controlled  Continue gabapentin 200 mg nightly   PDMP reviewed   Orders:  -     gabapentin (NEURONTIN) 100 MG capsule; Take 2 capsules by mouth nightly for 90 days., Disp-180 capsule, R-0Normal  2. Hypokalemia  Assessment & Plan:   Acute, uncomplicated  Likely 2/2 medication   Continue supplement, if abnormal on this lab will stop hygroton   Orders:  -     Basic Metabolic Panel; Future  3. Controlled type 2 diabetes mellitus without complication, without long-term current use of insulin (HCC)  Assessment & Plan:   Chronic, controlled  Update urine microalbumin    Orders:  -     Microalbumin / Creatinine Urine Ratio; Future      Health Maintenance Due   Topic    Diabetic Alb to Cr ratio (uACR) test            Return in about 6 months (around 5/29/2025) for DM2.  Subjective   HPI     Here to establish     Sees Dr Jones for Cardiology   Sees Dr Mcdonough for LYNNE   - on Cpap   Sees Melvin, may or may not need knee replacement       Recently had hypokalemia on labs  - taking 2 potassium supplements, starting 2-3 days ago   - denies symptoms      Restless legs  - uses gabapentin which helps     T2DM   - has had for almost 20 years  - controlled now w/ meds  - does occasionally get yeast infections and skin candidiasis       No results found for this visit on 11/29/24.    Review of Systems   Constitutional:  Negative for activity change, appetite change, fatigue and fever.   Eyes:  Negative for visual disturbance.   Respiratory:

## 2024-12-02 ENCOUNTER — TELEPHONE (OUTPATIENT)
Dept: INTERNAL MEDICINE | Age: 69
End: 2024-12-02

## 2024-12-02 NOTE — TELEPHONE ENCOUNTER
Unable to use urine provided for microalbumin. Do you want me to call patient to have it recollected?

## 2024-12-18 ENCOUNTER — OFFICE VISIT (OUTPATIENT)
Dept: CARDIOLOGY CLINIC | Age: 69
End: 2024-12-18
Payer: MEDICARE

## 2024-12-18 VITALS
BODY MASS INDEX: 37.89 KG/M2 | OXYGEN SATURATION: 98 % | WEIGHT: 194 LBS | HEART RATE: 73 BPM | DIASTOLIC BLOOD PRESSURE: 80 MMHG | RESPIRATION RATE: 16 BRPM | SYSTOLIC BLOOD PRESSURE: 130 MMHG

## 2024-12-18 DIAGNOSIS — R00.0 TACHYCARDIA: ICD-10-CM

## 2024-12-18 DIAGNOSIS — I15.2 HYPERTENSION ASSOCIATED WITH DIABETES (HCC): Primary | ICD-10-CM

## 2024-12-18 DIAGNOSIS — E78.5 DYSLIPIDEMIA: ICD-10-CM

## 2024-12-18 DIAGNOSIS — E11.59 HYPERTENSION ASSOCIATED WITH DIABETES (HCC): Primary | ICD-10-CM

## 2024-12-18 PROCEDURE — G8417 CALC BMI ABV UP PARAM F/U: HCPCS | Performed by: INTERNAL MEDICINE

## 2024-12-18 PROCEDURE — 1123F ACP DISCUSS/DSCN MKR DOCD: CPT | Performed by: INTERNAL MEDICINE

## 2024-12-18 PROCEDURE — G8399 PT W/DXA RESULTS DOCUMENT: HCPCS | Performed by: INTERNAL MEDICINE

## 2024-12-18 PROCEDURE — 3044F HG A1C LEVEL LT 7.0%: CPT | Performed by: INTERNAL MEDICINE

## 2024-12-18 PROCEDURE — 3075F SYST BP GE 130 - 139MM HG: CPT | Performed by: INTERNAL MEDICINE

## 2024-12-18 PROCEDURE — 99214 OFFICE O/P EST MOD 30 MIN: CPT | Performed by: INTERNAL MEDICINE

## 2024-12-18 PROCEDURE — 1036F TOBACCO NON-USER: CPT | Performed by: INTERNAL MEDICINE

## 2024-12-18 PROCEDURE — 3017F COLORECTAL CA SCREEN DOC REV: CPT | Performed by: INTERNAL MEDICINE

## 2024-12-18 PROCEDURE — 2022F DILAT RTA XM EVC RTNOPTHY: CPT | Performed by: INTERNAL MEDICINE

## 2024-12-18 PROCEDURE — G8484 FLU IMMUNIZE NO ADMIN: HCPCS | Performed by: INTERNAL MEDICINE

## 2024-12-18 PROCEDURE — 1159F MED LIST DOCD IN RCRD: CPT | Performed by: INTERNAL MEDICINE

## 2024-12-18 PROCEDURE — G8427 DOCREV CUR MEDS BY ELIG CLIN: HCPCS | Performed by: INTERNAL MEDICINE

## 2024-12-18 PROCEDURE — 3079F DIAST BP 80-89 MM HG: CPT | Performed by: INTERNAL MEDICINE

## 2024-12-18 PROCEDURE — 1090F PRES/ABSN URINE INCON ASSESS: CPT | Performed by: INTERNAL MEDICINE

## 2024-12-18 RX ORDER — CHLORTHALIDONE 25 MG/1
25 TABLET ORAL DAILY
Qty: 90 TABLET | Refills: 3 | Status: SHIPPED | OUTPATIENT
Start: 2024-12-18

## 2024-12-18 RX ORDER — METOPROLOL SUCCINATE 100 MG/1
150 TABLET, EXTENDED RELEASE ORAL DAILY
Qty: 90 TABLET | Refills: 3 | Status: SHIPPED | OUTPATIENT
Start: 2024-12-18

## 2024-12-18 RX ORDER — ATORVASTATIN CALCIUM 20 MG/1
20 TABLET, FILM COATED ORAL NIGHTLY
Qty: 90 TABLET | Refills: 3 | Status: SHIPPED | OUTPATIENT
Start: 2024-12-18

## 2024-12-18 RX ORDER — DILTIAZEM HYDROCHLORIDE 120 MG/1
120 CAPSULE, COATED, EXTENDED RELEASE ORAL DAILY
Qty: 90 CAPSULE | Refills: 5 | Status: SHIPPED | OUTPATIENT
Start: 2024-12-18

## 2024-12-18 ASSESSMENT — ENCOUNTER SYMPTOMS
CHEST TIGHTNESS: 0
APNEA: 0
SHORTNESS OF BREATH: 0
COUGH: 0
VOMITING: 0
COLOR CHANGE: 0
EYE REDNESS: 0
ABDOMINAL PAIN: 0
CONSTIPATION: 0
WHEEZING: 0
NAUSEA: 0
RHINORRHEA: 0
DIARRHEA: 0

## 2024-12-18 NOTE — PROGRESS NOTES
Chief Complaint   Patient presents with    6 Month Follow-Up       Patient presents for initial medical evaluation. Patient is followed on a regular basis by Alla Camejo MD.     Morbidly obese  Mild CAD by angiogram 6/8/2023  Diabetes  LYNNE on CPAP  Obesity  Hypertension  Mild MR  Hyperlipidemia  Abnormal EKG: EKG showing T wave inversions in the inferior leads (old)    Cardiac studies:  Recent hospitalization to [facility] on 1/21/2022 and discharged 1/22/2022 for palpitations.  Nuclear stress test negative  Nuclear stress test on 1/21/2022 normal EF 82%.  Inferior wall filling defect possibly breast tissue attenuation.  Otherwise no ischemia noted.  Echocardiogram 1/21/2022 EF 60%, severely dilated left atria, mild MR  Holter 3/1/2022 1 run 4 beats of V. tach no other major arrhythmias  Coronary angiogram 6/8/2023 mild CAD  Carotid ultrasound 1/4/2023 less than 50% stenosis  JACQUELYN 1/4/2023 no significant stenosis  ==================  12/18/2020  Follow-up on CAD  Patient reports feeling well no chest pain or shortness of breath  Patient was at the PCP office were glimepiride was stopped at night  Currently dealing with left knee arthritis and patient reports that possibly she may have surgery possibly next year    6/19/2024  Follow-up on CAD  Patient reports feeling well denies chest pain or shortness of breath  Patient is fairly active at home, patient is gardening, patient was able to put down mulch by herself without cardiac limitations  Blood pressure well-controlled today 120/74  EKG today sinus rhythm no major arrhythmias, T wave inversions in the inferior leads which are all    12/20/2023  Follow-up on CAD  Patient underwent shoulder surgery July 2023  Patient completed physical therapy  Cardiac wise patient asymptomatic denies chest pain or shortness of breath  Carotid ultrasound 1/4/2023 less than 50% stenosis  JACQUELYN 1/4/2023 no significant stenosis    6/21/2023  Follow-up on CAD  Earlier this month

## 2024-12-22 DIAGNOSIS — I10 ESSENTIAL HYPERTENSION: ICD-10-CM

## 2024-12-22 DIAGNOSIS — E11.9 TYPE 2 DIABETES MELLITUS WITHOUT COMPLICATION, WITHOUT LONG-TERM CURRENT USE OF INSULIN (HCC): ICD-10-CM

## 2024-12-23 RX ORDER — GLIMEPIRIDE 1 MG/1
1 TABLET ORAL
Qty: 90 TABLET | Refills: 2 | Status: SHIPPED | OUTPATIENT
Start: 2024-12-23

## 2024-12-23 RX ORDER — LOSARTAN POTASSIUM 25 MG/1
25 TABLET ORAL DAILY
Qty: 90 TABLET | Refills: 1 | Status: SHIPPED | OUTPATIENT
Start: 2024-12-23

## 2024-12-27 DIAGNOSIS — J45.41 MODERATE PERSISTENT ASTHMA WITH ACUTE EXACERBATION: ICD-10-CM

## 2024-12-27 NOTE — TELEPHONE ENCOUNTER
Comments:     Last Office Visit (last PCP visit):   Visit date not found    Next Visit Date:  Future Appointments   Date Time Provider Department Center   6/18/2025  8:00 AM [place], [place], [place] [facility]       **If hasn't been seen in over a year OR hasn't followed up according to last diabetes/ADHD visit, make appointment for patient before sending refill to provider.    Rx requested:  Requested Prescriptions     Pending Prescriptions Disp Refills    montelukast (SINGULAIR) 10 MG tablet 90 tablet 2     Sig: Take 1 tablet by mouth nightly

## 2024-12-29 RX ORDER — MONTELUKAST SODIUM 10 MG/1
10 TABLET ORAL NIGHTLY
Qty: 90 TABLET | Refills: 2 | Status: SHIPPED | OUTPATIENT
Start: 2024-12-29

## 2025-01-22 ENCOUNTER — HOSPITAL ENCOUNTER (OUTPATIENT)
Dept: LAB | Age: 70
Discharge: HOME | End: 2025-01-22
Payer: MEDICARE

## 2025-01-22 DIAGNOSIS — E11.9 CONTROLLED TYPE 2 DIABETES MELLITUS WITHOUT COMPLICATION, WITHOUT LONG-TERM CURRENT USE OF INSULIN (HCC): ICD-10-CM

## 2025-01-22 LAB
CREAT UR-MCNC: 164.3 MG/DL
MICROALBUMIN UR-MCNC: <1.2 MG/DL
MICROALBUMIN/CREAT UR-RTO: NORMAL MG/G (ref 0–30)

## 2025-01-22 PROCEDURE — 82043 UR ALBUMIN QUANTITATIVE: CPT

## 2025-01-22 PROCEDURE — 82570 ASSAY OF URINE CREATININE: CPT

## 2025-02-01 DIAGNOSIS — E11.40 TYPE 2 DIABETES MELLITUS WITH DIABETIC NEUROPATHY, UNSPECIFIED WHETHER LONG TERM INSULIN USE (HCC): ICD-10-CM

## 2025-02-03 RX ORDER — GLIMEPIRIDE 2 MG/1
2 TABLET ORAL
Qty: 180 TABLET | Refills: 2 | Status: SHIPPED | OUTPATIENT
Start: 2025-02-03

## 2025-02-03 NOTE — TELEPHONE ENCOUNTER
Pharmacy is requesting medication refill. Please approve or deny this request.    Rx requested:  Requested Prescriptions     Pending Prescriptions Disp Refills    glimepiride (AMARYL) 2 MG tablet 180 tablet 2     Sig: Take 1 tablet by mouth every morning (before breakfast)         Last Office Visit:   Visit date not found      Next Visit Date:  Future Appointments   Date Time Provider Department Center   6/18/2025  8:00 AM [place], [place], [place] [facility]

## 2025-02-03 NOTE — TELEPHONE ENCOUNTER
Patient is requesting medication refill. Please approve or deny this request.    Rx requested:  Requested Prescriptions     Pending Prescriptions Disp Refills    metFORMIN (GLUCOPHAGE) 1000 MG tablet 180 tablet 2     Sig: Take 1 tablet by mouth 2 times daily (with meals)         Last Office Visit:   Visit date not found      Next Visit Date:  Future Appointments   Date Time Provider Department Center   6/18/2025  8:00 AM [place], [place], [place] [facility]

## 2025-04-02 ENCOUNTER — HOSPITAL ENCOUNTER (OUTPATIENT)
Dept: WOMENS IMAGING | Age: 70
Discharge: HOME | End: 2025-04-04
Payer: MEDICARE

## 2025-04-02 DIAGNOSIS — Z12.31 ENCOUNTER FOR SCREENING MAMMOGRAM FOR MALIGNANT NEOPLASM OF BREAST: ICD-10-CM

## 2025-04-02 PROCEDURE — 77063 BREAST TOMOSYNTHESIS BI: CPT

## 2025-04-17 DIAGNOSIS — E11.9 CONTROLLED TYPE 2 DIABETES MELLITUS WITHOUT COMPLICATION, WITHOUT LONG-TERM CURRENT USE OF INSULIN: ICD-10-CM

## 2025-04-18 NOTE — TELEPHONE ENCOUNTER
Comments:     Last Office Visit (last PCP visit):   8/26/2024    Next Visit Date:  Future Appointments   Date Time Provider Department Center   6/18/2025  8:00 AM [place], [place], [place] [facility]   10/14/2025  9:00 AM Alla Fischer MD [facility]       **If hasn't been seen in over a year OR hasn't followed up according to last diabetes/ADHD visit, make appointment for patient before sending refill to provider.    Rx requested:  Requested Prescriptions     Pending Prescriptions Disp Refills    dulaglutide (TRULICITY) 1.5 MG/0.5ML SC injection 6 mL 1     Sig: Inject 0.5 mLs into the skin once a week

## 2025-04-28 DIAGNOSIS — G62.9 NEUROPATHY: ICD-10-CM

## 2025-04-28 RX ORDER — GABAPENTIN 100 MG/1
200 CAPSULE ORAL NIGHTLY
Qty: 180 CAPSULE | Refills: 0 | Status: SHIPPED | OUTPATIENT
Start: 2025-04-28 | End: 2025-07-27

## 2025-04-28 NOTE — TELEPHONE ENCOUNTER
Comments:     Last Office Visit (last PCP visit):   11/29/2024    Next Visit Date:  Future Appointments   Date Time Provider Department Center   6/18/2025  8:00 AM [place], [place], [place] [facility]   10/14/2025  9:00 AM Alla Fischer MD [facility]       **If hasn't been seen in over a year OR hasn't followed up according to last diabetes/ADHD visit, make appointment for patient before sending refill to provider.    Rx requested:  Requested Prescriptions     Pending Prescriptions Disp Refills    gabapentin (NEURONTIN) 100 MG capsule 180 capsule 0     Sig: Take 2 capsules by mouth nightly for 90 days.

## 2025-05-29 ENCOUNTER — TELEPHONE (OUTPATIENT)
Dept: INTERNAL MEDICINE | Age: 70
End: 2025-05-29

## 2025-05-29 DIAGNOSIS — E11.40 TYPE 2 DIABETES MELLITUS WITH DIABETIC NEUROPATHY, UNSPECIFIED WHETHER LONG TERM INSULIN USE (HCC): ICD-10-CM

## 2025-05-29 RX ORDER — ACYCLOVIR 800 MG/1
1 TABLET ORAL
Qty: 2 EACH | Refills: 4 | Status: SHIPPED | OUTPATIENT
Start: 2025-05-29

## 2025-06-10 DIAGNOSIS — I10 ESSENTIAL HYPERTENSION: ICD-10-CM

## 2025-06-11 RX ORDER — LOSARTAN POTASSIUM 25 MG/1
25 TABLET ORAL DAILY
Qty: 90 TABLET | Refills: 1 | Status: SHIPPED | OUTPATIENT
Start: 2025-06-11

## 2025-06-18 ENCOUNTER — OFFICE VISIT (OUTPATIENT)
Age: 70
End: 2025-06-18
Payer: MEDICARE

## 2025-06-18 VITALS
WEIGHT: 196 LBS | SYSTOLIC BLOOD PRESSURE: 108 MMHG | BODY MASS INDEX: 38.28 KG/M2 | HEART RATE: 69 BPM | DIASTOLIC BLOOD PRESSURE: 72 MMHG | OXYGEN SATURATION: 96 % | RESPIRATION RATE: 16 BRPM

## 2025-06-18 DIAGNOSIS — I25.83 CORONARY ARTERY DISEASE DUE TO LIPID RICH PLAQUE: Primary | ICD-10-CM

## 2025-06-18 DIAGNOSIS — E78.5 DYSLIPIDEMIA: ICD-10-CM

## 2025-06-18 DIAGNOSIS — R00.0 TACHYCARDIA: ICD-10-CM

## 2025-06-18 DIAGNOSIS — I10 ESSENTIAL HYPERTENSION: ICD-10-CM

## 2025-06-18 DIAGNOSIS — I25.10 CORONARY ARTERY DISEASE DUE TO LIPID RICH PLAQUE: Primary | ICD-10-CM

## 2025-06-18 PROCEDURE — 1090F PRES/ABSN URINE INCON ASSESS: CPT | Performed by: INTERNAL MEDICINE

## 2025-06-18 PROCEDURE — 1036F TOBACCO NON-USER: CPT | Performed by: INTERNAL MEDICINE

## 2025-06-18 PROCEDURE — G8399 PT W/DXA RESULTS DOCUMENT: HCPCS | Performed by: INTERNAL MEDICINE

## 2025-06-18 PROCEDURE — 3074F SYST BP LT 130 MM HG: CPT | Performed by: INTERNAL MEDICINE

## 2025-06-18 PROCEDURE — G8417 CALC BMI ABV UP PARAM F/U: HCPCS | Performed by: INTERNAL MEDICINE

## 2025-06-18 PROCEDURE — 3017F COLORECTAL CA SCREEN DOC REV: CPT | Performed by: INTERNAL MEDICINE

## 2025-06-18 PROCEDURE — 99213 OFFICE O/P EST LOW 20 MIN: CPT | Performed by: INTERNAL MEDICINE

## 2025-06-18 PROCEDURE — 1123F ACP DISCUSS/DSCN MKR DOCD: CPT | Performed by: INTERNAL MEDICINE

## 2025-06-18 PROCEDURE — 99214 OFFICE O/P EST MOD 30 MIN: CPT | Performed by: INTERNAL MEDICINE

## 2025-06-18 PROCEDURE — G8427 DOCREV CUR MEDS BY ELIG CLIN: HCPCS | Performed by: INTERNAL MEDICINE

## 2025-06-18 PROCEDURE — 3078F DIAST BP <80 MM HG: CPT | Performed by: INTERNAL MEDICINE

## 2025-06-18 PROCEDURE — 93010 ELECTROCARDIOGRAM REPORT: CPT | Performed by: INTERNAL MEDICINE

## 2025-06-18 PROCEDURE — 93005 ELECTROCARDIOGRAM TRACING: CPT | Performed by: INTERNAL MEDICINE

## 2025-06-18 PROCEDURE — 1159F MED LIST DOCD IN RCRD: CPT | Performed by: INTERNAL MEDICINE

## 2025-06-18 RX ORDER — LOSARTAN POTASSIUM 25 MG/1
25 TABLET ORAL DAILY
Qty: 90 TABLET | Refills: 5 | Status: SHIPPED | OUTPATIENT
Start: 2025-06-18

## 2025-06-18 RX ORDER — CHLORTHALIDONE 25 MG/1
25 TABLET ORAL DAILY
Qty: 90 TABLET | Refills: 5 | Status: SHIPPED | OUTPATIENT
Start: 2025-06-18

## 2025-06-18 RX ORDER — ATORVASTATIN CALCIUM 20 MG/1
20 TABLET, FILM COATED ORAL NIGHTLY
Qty: 90 TABLET | Refills: 5 | Status: SHIPPED | OUTPATIENT
Start: 2025-06-18

## 2025-06-18 RX ORDER — METOPROLOL SUCCINATE 100 MG/1
150 TABLET, EXTENDED RELEASE ORAL DAILY
Qty: 90 TABLET | Refills: 5 | Status: SHIPPED | OUTPATIENT
Start: 2025-06-18

## 2025-06-18 RX ORDER — DILTIAZEM HYDROCHLORIDE 120 MG/1
120 CAPSULE, COATED, EXTENDED RELEASE ORAL DAILY
Qty: 90 CAPSULE | Refills: 5 | Status: SHIPPED | OUTPATIENT
Start: 2025-06-18

## 2025-06-18 ASSESSMENT — ENCOUNTER SYMPTOMS
APNEA: 0
COUGH: 0
SHORTNESS OF BREATH: 0
DIARRHEA: 0
EYE REDNESS: 0
WHEEZING: 0
VOMITING: 0
CHEST TIGHTNESS: 0
RHINORRHEA: 0
CONSTIPATION: 0
COLOR CHANGE: 0
ABDOMINAL PAIN: 0
NAUSEA: 0

## 2025-06-18 NOTE — PROGRESS NOTES
Chief Complaint   Patient presents with    6 Month Follow-Up       Patient presents for initial medical evaluation. Patient is followed on a regular basis by Alla Camejo MD.     Morbidly obese  Mild CAD by angiogram 6/8/2023  Diabetes  LYNNE on CPAP  Obesity  Hypertension  Mild MR  Hyperlipidemia  Abnormal EKG: EKG showing T wave inversions in the inferior leads (old)    Cardiac studies:  Recent hospitalization to [facility] on 1/21/2022 and discharged 1/22/2022 for palpitations.  Nuclear stress test negative  Nuclear stress test on 1/21/2022 normal EF 82%.  Inferior wall filling defect possibly breast tissue attenuation.  Otherwise no ischemia noted.  Echocardiogram 1/21/2022 EF 60%, severely dilated left atria, mild MR  Holter 3/1/2022 1 run 4 beats of V. tach no other major arrhythmias  Coronary angiogram 6/8/2023 mild CAD  Carotid ultrasound 1/4/2023 less than 50% stenosis  JACQUELYN 1/4/2023 no significant stenosis  ==================  6/18/2025  Follow-up on CAD  Patient reports feeling well  Reports that she walks at least 3 times per week for about half hour  No cardiac limitations with this activity  Sometimes her level of activity is limited by left knee pain.  Patient reports that she may need surgery but she is not ready  Blood pressure 108/72  EKG sinus rhythm with T wave inversions in inferior leads grossly unchanged compared to prior, there is some unspecific T wave abnormality on the lateral leads  For now since patient is asymptomatic I would like to continue your observing for any signs of ischemia such as chest pain or shortness of breath  Patient bought an RV    12/18/2020  Follow-up on CAD  Patient reports feeling well no chest pain or shortness of breath  Patient was at the PCP office were glimepiride was stopped at night  Currently dealing with left knee arthritis and patient reports that possibly she may have surgery possibly next year    6/19/2024  Follow-up on CAD  Patient reports feeling

## 2025-06-22 DIAGNOSIS — I10 ESSENTIAL HYPERTENSION: ICD-10-CM

## 2025-06-23 RX ORDER — LOSARTAN POTASSIUM 25 MG/1
25 TABLET ORAL DAILY
Qty: 90 TABLET | Refills: 2 | OUTPATIENT
Start: 2025-06-23

## 2025-07-22 DIAGNOSIS — E11.40 TYPE 2 DIABETES MELLITUS WITH DIABETIC NEUROPATHY, UNSPECIFIED WHETHER LONG TERM INSULIN USE (HCC): ICD-10-CM

## 2025-07-22 RX ORDER — ACYCLOVIR 800 MG/1
1 TABLET ORAL
Qty: 2 EACH | Refills: 4 | Status: SHIPPED | OUTPATIENT
Start: 2025-07-22

## 2025-08-03 DIAGNOSIS — G62.9 NEUROPATHY: ICD-10-CM

## 2025-08-03 RX ORDER — GABAPENTIN 100 MG/1
200 CAPSULE ORAL NIGHTLY
Qty: 180 CAPSULE | Refills: 0 | Status: SHIPPED | OUTPATIENT
Start: 2025-08-03 | End: 2025-11-01

## (undated) DEVICE — MEDI-VAC NON-CONDUCTIVE SUCTION TUBING: Brand: CARDINAL HEALTH

## (undated) DEVICE — Device: Brand: ENDO SMARTCAP

## (undated) DEVICE — FORCEPS BX L240CM JAW DIA2.4MM ORNG L CAP W/ NDL DISP RAD

## (undated) DEVICE — DRESSING GZ W1XL8IN COT XRFRM N ADH OVERWRAP CURAD

## (undated) DEVICE — 3M™ MICROFOAM™ TAPE 1528-4: Brand: 3M™ MICROFOAM™

## (undated) DEVICE — SUTURE NONABSORBABLE BRAIDED 1.3 MM W/ LOOP WHT TIGERLINK

## (undated) DEVICE — 4-PORT MANIFOLD: Brand: NEPTUNE 2

## (undated) DEVICE — 3M™ STERI-DRAPE™ U-DRAPE 1015: Brand: STERI-DRAPE™

## (undated) DEVICE — TUBE IRRIG L8IN LNG PT W/ CONN FOR PMP SYS REDEUCE

## (undated) DEVICE — 5.5 MM CANNULA AND OBTURATOR,                                    CONICAL TIP, DISTAL HOLES

## (undated) DEVICE — COVER LT HNDL BLU PLAS

## (undated) DEVICE — GLOVE ORANGE PI 8   MSG9080

## (undated) DEVICE — OMNI JUGS® LARGE VOLUME CANISTER WITH SOCK: Brand: DEROYAL

## (undated) DEVICE — MEDI-VAC TUBING CONNECT "T" POLYPROPYLENE: Brand: CARDINAL HEALTH

## (undated) DEVICE — TUBE ENDOSCP COLON CHANNEL

## (undated) DEVICE — SUTURE TIGERTAPE TIGERWIRE SZ 2-0 L30IN NONABSORBABLE AR72377T

## (undated) DEVICE — TRAYS TRANSPORT SCOPE OASIS W/LID

## (undated) DEVICE — BASIC SINGLE BASIN-LF: Brand: MEDLINE INDUSTRIES, INC.

## (undated) DEVICE — BW-412T DISP COMBO CLEANING BRUSH: Brand: SINGLE USE COMBINATION CLEANING BRUSH

## (undated) DEVICE — SUTURE NONABSORBABLE MONOFILAMENT 3-0 PS-1 18 IN BLK ETHILON 1663H

## (undated) DEVICE — BLADE SAW RESECTOR CUT 4MM

## (undated) DEVICE — CATHETER SCLERO L240CM NDL 25GA L4MM SHTH DIA2.3MM CNTRST

## (undated) DEVICE — FLUID CONTROL SHOULDER DRAPE PACK: Brand: CONVERTORS

## (undated) DEVICE — ENDO CARRY-ON PROCEDURE KIT INCLUDES LUBRICANT, DEFENDO OLYMPUS AIR, WATER, SUCTION, BIOPSY VALVE KIT, ENZYMATIC SPONGE, AND BASIN.: Brand: ENDO CARRY-ON PROCEDURE KIT

## (undated) DEVICE — TUBE SET 96 MM 64 MM H2O PERISTALTIC STD AUX CHANNEL

## (undated) DEVICE — MAT FLR SURG QUICKWICK 28X54 IN DISP

## (undated) DEVICE — GLOVE ORANGE PI 7 1/2   MSG9075

## (undated) DEVICE — PAD,ABDOMINAL,8"X10",ST,LF: Brand: MEDLINE

## (undated) DEVICE — KNEE ARTHROSCOPY II-LF: Brand: MEDLINE INDUSTRIES, INC.

## (undated) DEVICE — SUTURE PROL SZ 0 L30IN NONABSORBABLE BLU L36MM CT-1 1/2 CIR 8424H

## (undated) DEVICE — TRAP POLYP BALEEN

## (undated) DEVICE — TUBING PMP L8FT LNG W/ CONN FOR AR-6400 REDEUCE

## (undated) DEVICE — [AGGRESSIVE 6-FLUTE BARREL BUR, ARTHROSCOPIC SHAVER BLADE,  DO NOT RESTERILIZE,  DO NOT USE IF PACKAGE IS DAMAGED,  KEEP DRY,  KEEP AWAY FROM SUNLIGHT]: Brand: FORMULA

## (undated) DEVICE — CANNULA ARTHSCP L7CM DIA7MM TRNSLUC THRD FLX W/ NO SQUIRT

## (undated) DEVICE — Z INACTIVE NO ACTIVE SUPPLIER APPLICATOR MEDICATED 26 CC TINT HI-LITE ORNG STRL CHLORAPREP

## (undated) DEVICE — SLEEVE TRAC FOAM COBAN DISP FOR 3 PNT SHLDR DISTR SYS STAR

## (undated) DEVICE — ADAPTER FLSH PMP FLD MGMT GI IRRIG OFP 2 DISPOSABLE